# Patient Record
Sex: FEMALE | Race: WHITE | Employment: FULL TIME | URBAN - METROPOLITAN AREA
[De-identification: names, ages, dates, MRNs, and addresses within clinical notes are randomized per-mention and may not be internally consistent; named-entity substitution may affect disease eponyms.]

---

## 2018-03-20 LAB
CHOLESTEROL, TOTAL: 222 MG/DL
CHOLESTEROL/HDL RATIO: 3.3
GLUCOSE BLD-MCNC: 94 MG/DL
HDLC SERPL-MCNC: 68 MG/DL (ref 35–70)
LDL CHOLESTEROL CALCULATED: 139 MG/DL (ref 0–160)
TRIGL SERPL-MCNC: 75 MG/DL
VLDLC SERPL CALC-MCNC: NORMAL MG/DL

## 2018-10-11 ENCOUNTER — OFFICE VISIT (OUTPATIENT)
Dept: FAMILY MEDICINE CLINIC | Age: 28
End: 2018-10-11
Payer: COMMERCIAL

## 2018-10-11 VITALS
OXYGEN SATURATION: 98 % | HEIGHT: 65 IN | HEART RATE: 80 BPM | DIASTOLIC BLOOD PRESSURE: 62 MMHG | WEIGHT: 142 LBS | BODY MASS INDEX: 23.66 KG/M2 | SYSTOLIC BLOOD PRESSURE: 104 MMHG

## 2018-10-11 DIAGNOSIS — Z23 NEED FOR INFLUENZA VACCINATION: ICD-10-CM

## 2018-10-11 DIAGNOSIS — Z00.00 ANNUAL PHYSICAL EXAM: Primary | ICD-10-CM

## 2018-10-11 DIAGNOSIS — Z23 NEED FOR TETANUS BOOSTER: ICD-10-CM

## 2018-10-11 PROCEDURE — 90471 IMMUNIZATION ADMIN: CPT | Performed by: FAMILY MEDICINE

## 2018-10-11 PROCEDURE — 99385 PREV VISIT NEW AGE 18-39: CPT | Performed by: FAMILY MEDICINE

## 2018-10-11 PROCEDURE — 90715 TDAP VACCINE 7 YRS/> IM: CPT | Performed by: FAMILY MEDICINE

## 2018-10-11 PROCEDURE — 90686 IIV4 VACC NO PRSV 0.5 ML IM: CPT | Performed by: FAMILY MEDICINE

## 2018-10-11 PROCEDURE — 90472 IMMUNIZATION ADMIN EACH ADD: CPT | Performed by: FAMILY MEDICINE

## 2018-10-11 ASSESSMENT — PATIENT HEALTH QUESTIONNAIRE - PHQ9
SUM OF ALL RESPONSES TO PHQ QUESTIONS 1-9: 0
2. FEELING DOWN, DEPRESSED OR HOPELESS: 0
SUM OF ALL RESPONSES TO PHQ QUESTIONS 1-9: 0
1. LITTLE INTEREST OR PLEASURE IN DOING THINGS: 0
SUM OF ALL RESPONSES TO PHQ9 QUESTIONS 1 & 2: 0

## 2018-10-11 ASSESSMENT — ENCOUNTER SYMPTOMS
ABDOMINAL PAIN: 0
SHORTNESS OF BREATH: 0

## 2018-10-11 NOTE — PROGRESS NOTES
visual disturbance. Respiratory: Negative for shortness of breath. Cardiovascular: Negative for chest pain. Gastrointestinal: Negative for abdominal pain. Genitourinary: Negative for dysuria. Psychiatric/Behavioral: Negative for behavioral problems. PHYSICAL EXAMINATION:    /62 (Site: Left Upper Arm, Position: Sitting)   Pulse 80   Ht 5' 5\" (1.651 m)   Wt 142 lb (64.4 kg)   SpO2 98%   BMI 23.63 kg/m²     Physical Exam   Constitutional: She is oriented to person, place, and time. She appears well-developed and well-nourished. No distress. HENT:   Head: Normocephalic and atraumatic. Right Ear: External ear normal.   Left Ear: External ear normal.   Mouth/Throat: Oropharynx is clear and moist. No oropharyngeal exudate. Eyes: Pupils are equal, round, and reactive to light. Conjunctivae and EOM are normal. Right eye exhibits no discharge. Left eye exhibits no discharge. Cardiovascular: Normal rate, regular rhythm and normal heart sounds. Pulmonary/Chest: Effort normal and breath sounds normal. No respiratory distress. She has no wheezes. Abdominal: Soft. Bowel sounds are normal. She exhibits no distension. There is no tenderness. Musculoskeletal: She exhibits no edema. Neurological: She is alert and oriented to person, place, and time. Skin: Skin is warm and dry. She is not diaphoretic. Psychiatric: She has a normal mood and affect. Vitals reviewed. ASSESSMENT:   Well Adult, See encounter diagnoses  Meka Lei was seen today for new patient.     Diagnoses and all orders for this visit:    Annual physical exam  Gave referral to gyn for her to establish care for annual gyn exam.   -     Toyin Maier DO    Need for tetanus booster  -     Tdap (age 6y and older) IM ([address] [address])    Need for influenza vaccination  -     INFLUENZA, QUADV, 3 YRS AND OLDER, IM, PF, PREFILL SYR OR SDV, 0.5ML (FLUZONE QUADV, PF)          Plan:   See orders and medications filed with this encounter. The patient is advised to return for routine annual checkups. Encouraged healthy diet, regular exercise and multivitamin daily. Labs checked per orders. Optho visit q 1-2 years. Watch for skin mole changes. Vaccines ordered today per orders. Return in about 1 year (around 10/11/2019) for or sooner if needed.

## 2018-10-17 ENCOUNTER — OFFICE VISIT (OUTPATIENT)
Dept: OBGYN CLINIC | Age: 28
End: 2018-10-17
Payer: COMMERCIAL

## 2018-10-17 VITALS
SYSTOLIC BLOOD PRESSURE: 104 MMHG | HEIGHT: 64 IN | DIASTOLIC BLOOD PRESSURE: 62 MMHG | WEIGHT: 141.25 LBS | BODY MASS INDEX: 24.11 KG/M2 | HEART RATE: 70 BPM | TEMPERATURE: 97.8 F

## 2018-10-17 DIAGNOSIS — Z30.431 ENCOUNTER FOR ROUTINE CHECKING OF INTRAUTERINE CONTRACEPTIVE DEVICE (IUD): ICD-10-CM

## 2018-10-17 DIAGNOSIS — Z12.4 PAP SMEAR FOR CERVICAL CANCER SCREENING: ICD-10-CM

## 2018-10-17 DIAGNOSIS — Z01.419 WOMEN'S ANNUAL ROUTINE GYNECOLOGICAL EXAMINATION: Primary | ICD-10-CM

## 2018-10-17 PROCEDURE — 99385 PREV VISIT NEW AGE 18-39: CPT | Performed by: OBSTETRICS & GYNECOLOGY

## 2018-10-17 ASSESSMENT — ENCOUNTER SYMPTOMS
CHEST TIGHTNESS: 0
DIARRHEA: 0
SORE THROAT: 0
VOMITING: 0
BACK PAIN: 0
COLOR CHANGE: 0
WHEEZING: 0
SHORTNESS OF BREATH: 0
ABDOMINAL PAIN: 0
NAUSEA: 0

## 2018-10-24 LAB
HPV COMMENT: ABNORMAL
HPV TYPE 16: NOT DETECTED
HPV TYPE 18: NOT DETECTED
HPVOH (OTHER TYPES): DETECTED

## 2018-10-26 ENCOUNTER — OFFICE VISIT (OUTPATIENT)
Dept: OBGYN CLINIC | Age: 28
End: 2018-10-26
Payer: COMMERCIAL

## 2018-10-26 VITALS
SYSTOLIC BLOOD PRESSURE: 104 MMHG | WEIGHT: 141.5 LBS | HEART RATE: 71 BPM | TEMPERATURE: 98.1 F | DIASTOLIC BLOOD PRESSURE: 62 MMHG | BODY MASS INDEX: 24.1 KG/M2

## 2018-10-26 DIAGNOSIS — R87.610 ATYPICAL SQUAMOUS CELL CHANGES OF UNDETERMINED SIGNIFICANCE (ASCUS) ON CERVICAL CYTOLOGY WITH POSITIVE HIGH RISK HUMAN PAPILLOMA VIRUS (HPV): Primary | ICD-10-CM

## 2018-10-26 DIAGNOSIS — R87.810 ATYPICAL SQUAMOUS CELL CHANGES OF UNDETERMINED SIGNIFICANCE (ASCUS) ON CERVICAL CYTOLOGY WITH POSITIVE HIGH RISK HUMAN PAPILLOMA VIRUS (HPV): Primary | ICD-10-CM

## 2018-10-26 DIAGNOSIS — Z32.02 NEGATIVE PREGNANCY TEST: ICD-10-CM

## 2018-10-26 LAB
CONTROL: NORMAL
PREGNANCY TEST URINE, POC: NEGATIVE

## 2018-10-26 PROCEDURE — 81025 URINE PREGNANCY TEST: CPT | Performed by: OBSTETRICS & GYNECOLOGY

## 2018-10-26 PROCEDURE — 99999 PR OFFICE/OUTPT VISIT,PROCEDURE ONLY: CPT | Performed by: OBSTETRICS & GYNECOLOGY

## 2018-10-26 PROCEDURE — 57454 BX/CURETT OF CERVIX W/SCOPE: CPT | Performed by: OBSTETRICS & GYNECOLOGY

## 2018-10-26 RX ORDER — IBUPROFEN 200 MG
800 TABLET ORAL ONCE
Status: COMPLETED | OUTPATIENT
Start: 2018-10-26 | End: 2018-10-26

## 2018-10-26 RX ADMIN — Medication 800 MG: at 09:22

## 2018-11-13 ENCOUNTER — TELEPHONE (OUTPATIENT)
Dept: OBGYN CLINIC | Age: 28
End: 2018-11-13

## 2019-05-22 ENCOUNTER — OFFICE VISIT (OUTPATIENT)
Dept: FAMILY MEDICINE CLINIC | Age: 29
End: 2019-05-22
Payer: COMMERCIAL

## 2019-05-22 ENCOUNTER — HOSPITAL ENCOUNTER (OUTPATIENT)
Age: 29
Setting detail: SPECIMEN
Discharge: HOME | End: 2019-05-22
Payer: COMMERCIAL

## 2019-05-22 VITALS
TEMPERATURE: 98.5 F | OXYGEN SATURATION: 99 % | HEART RATE: 70 BPM | DIASTOLIC BLOOD PRESSURE: 72 MMHG | SYSTOLIC BLOOD PRESSURE: 100 MMHG | WEIGHT: 140.8 LBS | BODY MASS INDEX: 23.98 KG/M2

## 2019-05-22 DIAGNOSIS — K92.1 MELENA: ICD-10-CM

## 2019-05-22 DIAGNOSIS — R19.7 DIARRHEA, UNSPECIFIED TYPE: ICD-10-CM

## 2019-05-22 DIAGNOSIS — R19.7 DIARRHEA, UNSPECIFIED TYPE: Primary | ICD-10-CM

## 2019-05-22 LAB
BASOPHILS ABSOLUTE: 0 K/UL (ref 0–0.2)
BASOPHILS RELATIVE PERCENT: 0.4 %
EOSINOPHILS ABSOLUTE: 0.1 K/UL (ref 0–0.6)
EOSINOPHILS RELATIVE PERCENT: 0.8 %
HCT VFR BLD CALC: 41.6 % (ref 36–48)
HEMOGLOBIN: 14.5 G/DL (ref 12–16)
LYMPHOCYTES ABSOLUTE: 2 K/UL (ref 1–5.1)
LYMPHOCYTES RELATIVE PERCENT: 22.3 %
MCH RBC QN AUTO: 32.6 PG (ref 26–34)
MCHC RBC AUTO-ENTMCNC: 34.7 G/DL (ref 31–36)
MCV RBC AUTO: 94 FL (ref 80–100)
MONOCYTES ABSOLUTE: 0.7 K/UL (ref 0–1.3)
MONOCYTES RELATIVE PERCENT: 7.3 %
NEUTROPHILS ABSOLUTE: 6.3 K/UL (ref 1.7–7.7)
NEUTROPHILS RELATIVE PERCENT: 69.2 %
PDW BLD-RTO: 12.9 % (ref 12.4–15.4)
PLATELET # BLD: 175 K/UL (ref 135–450)
PLATELET SLIDE REVIEW: ADEQUATE
PMV BLD AUTO: 8.6 FL (ref 5–10.5)
RBC # BLD: 4.43 M/UL (ref 4–5.2)
WBC # BLD: 9.1 K/UL (ref 4–11)

## 2019-05-22 PROCEDURE — 87046 STOOL CULTR AEROBIC BACT EA: CPT

## 2019-05-22 PROCEDURE — 87505 NFCT AGENT DETECTION GI: CPT

## 2019-05-22 PROCEDURE — 87328 CRYPTOSPORIDIUM AG IA: CPT

## 2019-05-22 PROCEDURE — 99214 OFFICE O/P EST MOD 30 MIN: CPT | Performed by: FAMILY MEDICINE

## 2019-05-22 ASSESSMENT — PATIENT HEALTH QUESTIONNAIRE - PHQ9
2. FEELING DOWN, DEPRESSED OR HOPELESS: 0
SUM OF ALL RESPONSES TO PHQ QUESTIONS 1-9: 0
1. LITTLE INTEREST OR PLEASURE IN DOING THINGS: 0
SUM OF ALL RESPONSES TO PHQ QUESTIONS 1-9: 0
SUM OF ALL RESPONSES TO PHQ9 QUESTIONS 1 & 2: 0

## 2019-05-22 ASSESSMENT — ENCOUNTER SYMPTOMS
DIARRHEA: 1
ABDOMINAL PAIN: 1
FLATUS: 0
BLOATING: 0
VOMITING: 0

## 2019-05-22 NOTE — PROGRESS NOTES
Chief Complaint   Patient presents with    Diarrhea     1 week    Rectal Bleeding     2-3 days          Diarrhea    This is a new problem. Episode onset: 1 week. The problem occurs 2 to 4 times per day (last week was 5-10 times per day). The problem has been waxing and waning. The stool consistency is described as bloody. The patient states that diarrhea does not awaken her from sleep. Associated symptoms include abdominal pain. Pertinent negatives include no bloating, chills, fever, headaches, increased  flatus, vomiting or weight loss. Associated symptoms comments: Dark red blood mixed into stool. . Nothing aggravates the symptoms. There are no known risk factors. She has tried nothing for the symptoms. There is no history of irritable bowel syndrome. last week was watery stools, stools are more formed now but going to the bathroom more frequent. FH of IBS and colon cancer in maternal grandmother diagnosed 52's  No FH or ulcerative colitis  34 y.o. female presents today with above complaints    Patient Active Problem List   Diagnosis    Atypical squamous cell changes of undetermined significance (ASCUS) on cervical cytology with positive high risk human papilloma virus (HPV)     Past Medical History:   Diagnosis Date    Atypical squamous cell changes of undetermined significance (ASCUS) on cervical cytology with positive high risk human papilloma virus (HPV) 10/26/2018       No past surgical history on file. Most Recent Immunizations   Administered Date(s) Administered    Influenza, Quadv, 3 yrs and older, IM, PF (Fluzone 3 yrs and older or Afluria 5 yrs and older) 10/11/2018    Tdap (Boostrix, Adacel) 10/11/2018        Current Outpatient Medications   Medication Sig Dispense Refill    Levonorgestrel (LILETTA, 52 MG,) 19.5 MCG/DAY IUD by Intrauterine route       No current facility-administered medications for this visit.       No Known Allergies    Social History     Socioeconomic History    Marital status:      Spouse name: Not on file    Number of children: Not on file    Years of education: Not on file    Highest education level: Not on file   Occupational History    Not on file   Social Needs    Financial resource strain: Not on file    Food insecurity:     Worry: Not on file     Inability: Not on file    Transportation needs:     Medical: Not on file     Non-medical: Not on file   Tobacco Use    Smoking status: Never Smoker    Smokeless tobacco: Never Used   Substance and Sexual Activity    Alcohol use: Yes     Comment: socially: drink every other week    Drug use: No    Sexual activity: Yes     Partners: Male   Lifestyle    Physical activity:     Days per week: Not on file     Minutes per session: Not on file    Stress: Not on file   Relationships    Social connections:     Talks on phone: Not on file     Gets together: Not on file     Attends Zoroastrianism service: Not on file     Active member of club or organization: Not on file     Attends meetings of clubs or organizations: Not on file     Relationship status: Not on file    Intimate partner violence:     Fear of current or ex partner: Not on file     Emotionally abused: Not on file     Physically abused: Not on file     Forced sexual activity: Not on file   Other Topics Concern    Not on file   Social History Narrative    Not on file     Family History   Problem Relation Age of Onset    Depression Mother     High Cholesterol Mother     Alcohol Abuse Father     Other Brother 32        narcolepsy    Colon Cancer Maternal Grandmother     Cancer Maternal Grandfather         pancreatic    Alcohol Abuse Paternal Grandmother     Alcohol Abuse Paternal Grandfather                               Review Of Systems    Review of Systems   Constitutional: Negative for chills, fever and weight loss. Gastrointestinal: Positive for abdominal pain and diarrhea. Negative for bloating, flatus and vomiting.    Neurological: Negative

## 2019-05-23 LAB
CRYPTOSPORIDIUM ANTIGEN STOOL: NORMAL
GI BACTERIAL PATHOGENS BY PCR: NORMAL
GIARDIA ANTIGEN STOOL: NORMAL

## 2019-05-28 ENCOUNTER — INITIAL CONSULT (OUTPATIENT)
Dept: GASTROENTEROLOGY | Age: 29
End: 2019-05-28
Payer: COMMERCIAL

## 2019-05-28 VITALS
HEIGHT: 64 IN | SYSTOLIC BLOOD PRESSURE: 104 MMHG | WEIGHT: 143 LBS | DIASTOLIC BLOOD PRESSURE: 60 MMHG | BODY MASS INDEX: 24.41 KG/M2

## 2019-05-28 DIAGNOSIS — R19.7 BLOODY DIARRHEA: Primary | ICD-10-CM

## 2019-05-28 PROBLEM — K62.5 RECTAL BLEEDING: Status: ACTIVE | Noted: 2019-05-28

## 2019-05-28 PROBLEM — K62.5 RECTAL BLEEDING: Status: RESOLVED | Noted: 2019-05-28 | Resolved: 2019-05-28

## 2019-05-28 PROCEDURE — 99202 OFFICE O/P NEW SF 15 MIN: CPT | Performed by: INTERNAL MEDICINE

## 2019-05-28 NOTE — PROGRESS NOTES
 Transportation needs:     Medical: Not on file     Non-medical: Not on file   Tobacco Use    Smoking status: Never Smoker    Smokeless tobacco: Never Used   Substance and Sexual Activity    Alcohol use: Yes     Comment: socially: drink every other week    Drug use: No    Sexual activity: Yes     Partners: Male   Lifestyle    Physical activity:     Days per week: Not on file     Minutes per session: Not on file    Stress: Not on file   Relationships    Social connections:     Talks on phone: Not on file     Gets together: Not on file     Attends Yazidi service: Not on file     Active member of club or organization: Not on file     Attends meetings of clubs or organizations: Not on file     Relationship status: Not on file    Intimate partner violence:     Fear of current or ex partner: Not on file     Emotionally abused: Not on file     Physically abused: Not on file     Forced sexual activity: Not on file   Other Topics Concern    Not on file   Social History Narrative    Not on file     SURGICAL HISTORY   No past surgical history on file. CURRENT MEDICATIONS   (This list may include medications prescribed during this encounter as epic can not insert only the list prior to this encounter.)  Current Outpatient Rx   Medication Sig Dispense Refill    Levonorgestrel (LILETTA, 52 MG,) 19.5 MCG/DAY IUD by Intrauterine route        ALLERGIES   No Known Allergies  IMMUNIZATIONS     Immunization History   Administered Date(s) Administered    Influenza, Quadv, 3 yrs and older, IM, PF (Fluzone 3 yrs and older or Afluria 5 yrs and older) 10/11/2018    Tdap (Boostrix, Adacel) 10/11/2018     REVIEW OF SYSTEMS     Constitutional: denies fever and unexpected weight change. HENT: Negative for ear pain, hearing loss and nosebleeds. Eyes: Negative for pain and visual disturbance. Respiratory: Negative for cough, shortness of breath and wheezing.     Cardiovascular: Negative for chest pain, palpitations and leg swelling. Gastrointestinal: see HPI for details. Endocrine: Negative for polydipsia, polyphagia and polyuria. Genitourinary: Negative for difficulty urinating, dysuria, hematuria and urgency. Musculoskeletal: Positive for arthralgias and back pain. Skin: Negative for pallor and rash. Allergic/Immunologic: Negative for environmental allergies and immunocompromised state. Neurological: Negative for seizures, syncope. Hematological: Negative for adenopathy. Does not bruise/bleed easily. Psychiatric/Behavioral: Negative for agitation, confusion, hallucinations. PHYSICAL EXAM   /60 (Site: Right Upper Arm)   Ht 5' 4.25\" (1.632 m)   Wt 143 lb (64.9 kg)   BMI 24.36 kg/m²   Wt Readings from Last 3 Encounters:   05/28/19 143 lb (64.9 kg)   05/22/19 140 lb 12.8 oz (63.9 kg)   10/26/18 141 lb 8 oz (64.2 kg)     Constitutional: AAO3, No acute distress  HEENT: no pallor or icterus. Neck: supple, no adenopathy  Cardiovascular: Normal heart rate, Normal rhythm, No murmurs,. RS: Normal breath sounds, No wheezing,   Abdomen: soft, non tender, not distended, BS+. No hepatosplenomegaly. Extremities:  No edema. Neuro: AAO3, non focal.      FINAL IMPRESSION     Her symptoms appear to have resolved at least as of this time. She has not had any bleeding or diarrhea for 3-4 days now and is in fact constipated which is her baseline. Perhaps she had an infectious colitis/enterocolitis episode that has now resolved. Currently would avoid invasive testing as symptoms have resolved but if the diarrhea and bleeding recur she has been asked to call back and a colonoscopy will be setup. Plan discussed with her in detail.

## 2019-06-25 ENCOUNTER — TELEPHONE (OUTPATIENT)
Dept: GASTROENTEROLOGY | Age: 29
End: 2019-06-25

## 2019-06-25 NOTE — TELEPHONE ENCOUNTER
Patient called to let us know her symptoms have returned for about a week and half now. She is having bloody stools and yesterday there was more than usual. Advised patient colonoscopy is needed. Please call patient to setup.

## 2019-06-28 RX ORDER — POLYETHYLENE GLYCOL 3350 17 G/17G
POWDER, FOR SOLUTION ORAL
Qty: 238 G | Refills: 0 | Status: ON HOLD | OUTPATIENT
Start: 2019-06-28 | End: 2019-07-08 | Stop reason: HOSPADM

## 2019-07-05 ENCOUNTER — TELEPHONE (OUTPATIENT)
Dept: GASTROENTEROLOGY | Age: 29
End: 2019-07-05

## 2019-07-08 ENCOUNTER — HOSPITAL ENCOUNTER (OUTPATIENT)
Age: 29
Setting detail: OUTPATIENT SURGERY
Discharge: HOME | End: 2019-07-08
Attending: INTERNAL MEDICINE | Admitting: INTERNAL MEDICINE
Payer: COMMERCIAL

## 2019-07-08 VITALS
RESPIRATION RATE: 14 BRPM | TEMPERATURE: 97.6 F | BODY MASS INDEX: 23.32 KG/M2 | HEIGHT: 65 IN | OXYGEN SATURATION: 100 % | WEIGHT: 140 LBS | SYSTOLIC BLOOD PRESSURE: 98 MMHG | DIASTOLIC BLOOD PRESSURE: 56 MMHG | HEART RATE: 53 BPM

## 2019-07-08 PROBLEM — R19.7 BLOODY DIARRHEA: Status: RESOLVED | Noted: 2019-05-28 | Resolved: 2019-07-08

## 2019-07-08 PROBLEM — K62.5 RECTAL BLEEDING: Status: ACTIVE | Noted: 2019-07-08

## 2019-07-08 PROCEDURE — 6360000002 HC RX W HCPCS: Performed by: INTERNAL MEDICINE

## 2019-07-08 PROCEDURE — 7100000011 HC PHASE II RECOVERY - ADDTL 15 MIN: Performed by: INTERNAL MEDICINE

## 2019-07-08 PROCEDURE — 45378 DIAGNOSTIC COLONOSCOPY: CPT | Performed by: INTERNAL MEDICINE

## 2019-07-08 PROCEDURE — 99152 MOD SED SAME PHYS/QHP 5/>YRS: CPT | Performed by: INTERNAL MEDICINE

## 2019-07-08 PROCEDURE — 7100000010 HC PHASE II RECOVERY - FIRST 15 MIN: Performed by: INTERNAL MEDICINE

## 2019-07-08 PROCEDURE — 3609027000 HC COLONOSCOPY: Performed by: INTERNAL MEDICINE

## 2019-07-08 PROCEDURE — 2709999900 HC NON-CHARGEABLE SUPPLY: Performed by: INTERNAL MEDICINE

## 2019-07-08 PROCEDURE — 99153 MOD SED SAME PHYS/QHP EA: CPT | Performed by: INTERNAL MEDICINE

## 2019-07-08 RX ORDER — FENTANYL CITRATE 50 UG/ML
INJECTION, SOLUTION INTRAMUSCULAR; INTRAVENOUS PRN
Status: DISCONTINUED | OUTPATIENT
Start: 2019-07-08 | End: 2019-07-08 | Stop reason: ALTCHOICE

## 2019-07-08 RX ORDER — DIPHENHYDRAMINE HYDROCHLORIDE 50 MG/ML
INJECTION INTRAMUSCULAR; INTRAVENOUS PRN
Status: DISCONTINUED | OUTPATIENT
Start: 2019-07-08 | End: 2019-07-08 | Stop reason: ALTCHOICE

## 2019-07-08 RX ORDER — SODIUM CHLORIDE, SODIUM LACTATE, POTASSIUM CHLORIDE, CALCIUM CHLORIDE 600; 310; 30; 20 MG/100ML; MG/100ML; MG/100ML; MG/100ML
INJECTION, SOLUTION INTRAVENOUS CONTINUOUS
Status: DISCONTINUED | OUTPATIENT
Start: 2019-07-08 | End: 2019-07-08 | Stop reason: HOSPADM

## 2019-07-08 RX ORDER — HYDROCORTISONE ACETATE 25 MG/1
25 SUPPOSITORY RECTAL EVERY 12 HOURS
Qty: 28 SUPPOSITORY | Refills: 1 | Status: SHIPPED | OUTPATIENT
Start: 2019-07-08 | End: 2019-10-17 | Stop reason: ALTCHOICE

## 2019-07-08 RX ORDER — MIDAZOLAM HYDROCHLORIDE 5 MG/ML
INJECTION INTRAMUSCULAR; INTRAVENOUS PRN
Status: DISCONTINUED | OUTPATIENT
Start: 2019-07-08 | End: 2019-07-08 | Stop reason: ALTCHOICE

## 2019-07-08 ASSESSMENT — PAIN - FUNCTIONAL ASSESSMENT: PAIN_FUNCTIONAL_ASSESSMENT: 0-10

## 2019-07-08 ASSESSMENT — PAIN DESCRIPTION - DESCRIPTORS: DESCRIPTORS: ACHING

## 2019-07-08 NOTE — OP NOTE
colonoscopy to the terminal ileum. - Anesthesia issues: no    Specimens: Was Not Obtained    Complications:   None    Estimated blood loss: minimal    Disposition:   PACU - hemodynamically stable. Impression:   -Small internal hemorrhoids on retroflexion in the rectum. Otherwise normal colonoscopy to the terminal ileum. Recommendations:  -Next colonoscopy at age 50 for screening.  - Rectal bleeding may be hemorrhoidal or perianal. Take fiber supplementation daily, can try Anusol suppositories prn.         Tyler Byers 7/8/19 11:56 AM

## 2019-10-17 ENCOUNTER — OFFICE VISIT (OUTPATIENT)
Dept: FAMILY MEDICINE CLINIC | Age: 29
End: 2019-10-17
Payer: COMMERCIAL

## 2019-10-17 VITALS
OXYGEN SATURATION: 100 % | TEMPERATURE: 97.8 F | DIASTOLIC BLOOD PRESSURE: 66 MMHG | BODY MASS INDEX: 24.22 KG/M2 | WEIGHT: 145.4 LBS | HEIGHT: 65 IN | SYSTOLIC BLOOD PRESSURE: 110 MMHG | HEART RATE: 61 BPM

## 2019-10-17 DIAGNOSIS — Z00.00 ANNUAL PHYSICAL EXAM: Primary | ICD-10-CM

## 2019-10-17 DIAGNOSIS — Z23 NEED FOR VACCINATION: ICD-10-CM

## 2019-10-17 DIAGNOSIS — Z23 NEED FOR INFLUENZA VACCINATION: ICD-10-CM

## 2019-10-17 LAB
A/G RATIO: 2.5 (ref 1.1–2.2)
ALBUMIN SERPL-MCNC: 5 G/DL (ref 3.4–5)
ALP BLD-CCNC: 59 U/L (ref 40–129)
ALT SERPL-CCNC: 12 U/L (ref 10–40)
ANION GAP SERPL CALCULATED.3IONS-SCNC: 14 MMOL/L (ref 3–16)
AST SERPL-CCNC: 18 U/L (ref 15–37)
BILIRUB SERPL-MCNC: 1.3 MG/DL (ref 0–1)
BUN BLDV-MCNC: 9 MG/DL (ref 7–20)
CALCIUM SERPL-MCNC: 9.8 MG/DL (ref 8.3–10.6)
CHLORIDE BLD-SCNC: 101 MMOL/L (ref 99–110)
CHOLESTEROL, TOTAL: 245 MG/DL (ref 0–199)
CO2: 23 MMOL/L (ref 21–32)
CREAT SERPL-MCNC: 0.6 MG/DL (ref 0.6–1.1)
GFR AFRICAN AMERICAN: >60
GFR NON-AFRICAN AMERICAN: >60
GLOBULIN: 2 G/DL
GLUCOSE BLD-MCNC: 84 MG/DL (ref 70–99)
HDLC SERPL-MCNC: 89 MG/DL (ref 40–60)
LDL CHOLESTEROL CALCULATED: 144 MG/DL
POTASSIUM SERPL-SCNC: 4.3 MMOL/L (ref 3.5–5.1)
SODIUM BLD-SCNC: 138 MMOL/L (ref 136–145)
TOTAL PROTEIN: 7 G/DL (ref 6.4–8.2)
TRIGL SERPL-MCNC: 59 MG/DL (ref 0–150)
VLDLC SERPL CALC-MCNC: 12 MG/DL

## 2019-10-17 PROCEDURE — 90686 IIV4 VACC NO PRSV 0.5 ML IM: CPT | Performed by: FAMILY MEDICINE

## 2019-10-17 PROCEDURE — 99395 PREV VISIT EST AGE 18-39: CPT | Performed by: FAMILY MEDICINE

## 2019-10-17 PROCEDURE — 36415 COLL VENOUS BLD VENIPUNCTURE: CPT | Performed by: FAMILY MEDICINE

## 2019-10-17 PROCEDURE — 90471 IMMUNIZATION ADMIN: CPT | Performed by: FAMILY MEDICINE

## 2019-10-17 ASSESSMENT — ENCOUNTER SYMPTOMS
ABDOMINAL PAIN: 0
SHORTNESS OF BREATH: 0
NAUSEA: 0
DIARRHEA: 0
CONSTIPATION: 0
VOMITING: 0

## 2019-11-05 ENCOUNTER — OFFICE VISIT (OUTPATIENT)
Dept: OBGYN CLINIC | Age: 29
End: 2019-11-05
Payer: COMMERCIAL

## 2019-11-05 VITALS
WEIGHT: 145.6 LBS | BODY MASS INDEX: 24.23 KG/M2 | TEMPERATURE: 98.4 F | HEART RATE: 68 BPM | DIASTOLIC BLOOD PRESSURE: 62 MMHG | SYSTOLIC BLOOD PRESSURE: 98 MMHG

## 2019-11-05 DIAGNOSIS — R87.610 ATYPICAL SQUAMOUS CELL CHANGES OF UNDETERMINED SIGNIFICANCE (ASCUS) ON CERVICAL CYTOLOGY WITH POSITIVE HIGH RISK HUMAN PAPILLOMA VIRUS (HPV): ICD-10-CM

## 2019-11-05 DIAGNOSIS — R87.810 ATYPICAL SQUAMOUS CELL CHANGES OF UNDETERMINED SIGNIFICANCE (ASCUS) ON CERVICAL CYTOLOGY WITH POSITIVE HIGH RISK HUMAN PAPILLOMA VIRUS (HPV): ICD-10-CM

## 2019-11-05 DIAGNOSIS — Z01.419 WOMEN'S ANNUAL ROUTINE GYNECOLOGICAL EXAMINATION: Primary | ICD-10-CM

## 2019-11-05 DIAGNOSIS — Z31.69 ENCOUNTER FOR PRECONCEPTION CONSULTATION: ICD-10-CM

## 2019-11-05 DIAGNOSIS — Z30.011 INITIATION OF OCP (BCP): ICD-10-CM

## 2019-11-05 DIAGNOSIS — Z12.4 PAP SMEAR FOR CERVICAL CANCER SCREENING: ICD-10-CM

## 2019-11-05 DIAGNOSIS — Z30.432 ENCOUNTER FOR IUD REMOVAL: ICD-10-CM

## 2019-11-05 PROCEDURE — 99385 PREV VISIT NEW AGE 18-39: CPT | Performed by: OBSTETRICS & GYNECOLOGY

## 2019-11-05 RX ORDER — NORGESTIMATE AND ETHINYL ESTRADIOL 0.25-0.035
1 KIT ORAL DAILY
Qty: 1 PACKET | Refills: 3 | Status: SHIPPED | OUTPATIENT
Start: 2019-11-05 | End: 2020-02-18

## 2020-10-07 ENCOUNTER — OFFICE VISIT (OUTPATIENT)
Dept: OBGYN CLINIC | Age: 30
End: 2020-10-07
Payer: COMMERCIAL

## 2020-10-07 VITALS
WEIGHT: 161.13 LBS | DIASTOLIC BLOOD PRESSURE: 82 MMHG | BODY MASS INDEX: 26.81 KG/M2 | TEMPERATURE: 97.3 F | SYSTOLIC BLOOD PRESSURE: 114 MMHG | HEART RATE: 88 BPM

## 2020-10-07 PROCEDURE — 99395 PREV VISIT EST AGE 18-39: CPT | Performed by: OBSTETRICS & GYNECOLOGY

## 2020-10-07 PROCEDURE — G8484 FLU IMMUNIZE NO ADMIN: HCPCS | Performed by: OBSTETRICS & GYNECOLOGY

## 2020-10-07 ASSESSMENT — ENCOUNTER SYMPTOMS
SHORTNESS OF BREATH: 0
NAUSEA: 0
DIARRHEA: 0
ABDOMINAL PAIN: 0
VOMITING: 0
CONSTIPATION: 0

## 2020-10-07 NOTE — PROGRESS NOTES
Annual Exam      CC:   Chief Complaint   Patient presents with    Contraception       HPI:  31 y.o. G0 presents for her gynecologic annual exam. Also would like to discuss getting another IUD. Had one removed last year, has been on OCP since then. Has decided she wants another IUD at this time as she is unsure when she desires pregnancy. Patient seen and examined. Review of Systems:   Review of Systems   Constitutional: Negative for chills and fever. Respiratory: Negative for shortness of breath. Cardiovascular: Negative for chest pain. Gastrointestinal: Negative for abdominal pain, constipation, diarrhea, nausea and vomiting. Genitourinary: Negative for difficulty urinating, dysuria, menstrual problem, vaginal bleeding and vaginal discharge. Neurological: Negative for dizziness, light-headedness and headaches. Primary Care Physician: AMINAH Mascorro CNP    Obstetric History  OB History   No obstetric history on file. Gynecologic History  Menstrual History:   LMP: 9/23/2020   Menstrual pattern: q28-30d, last 4-6, moderate flow  Sexual History:   Contraception: OCP   Currently is sexually active   Denies history of STIs   No sexual problems  Pap History:   Last pap smear: ASCUS/HPV+ in 2019   History of abnormal pap smears: yes, see above    Medical History:  Past Medical History:   Diagnosis Date    Atypical squamous cell changes of undetermined significance (ASCUS) on cervical cytology with positive high risk human papilloma virus (HPV) 10/26/2018       Surgical History:  Past Surgical History:   Procedure Laterality Date    COLONOSCOPY N/A 7/8/2019       Medications:  Current Outpatient Medications   Medication Sig Dispense Refill    SPRINTEC 28 0.25-35 MG-MCG per tablet TAKE 1 TABLET BY MOUTH EVERY DAY 28 tablet 9     No current facility-administered medications for this visit.         Allergies:  No Known Allergies    Social History:  Social History Size: Medium Adult)   Pulse 88   Temp 97.3 °F (36.3 °C) (Oral)   Wt 161 lb 2 oz (73.1 kg)   LMP 09/23/2020 (Exact Date)   Breastfeeding No   BMI 26.81 kg/m²     Exam:   Physical Exam  Exam conducted with a chaperone present. Constitutional:       Appearance: She is well-developed. HENT:      Head: Normocephalic and atraumatic. Neck:      Musculoskeletal: Normal range of motion. Cardiovascular:      Rate and Rhythm: Normal rate and regular rhythm. Pulmonary:      Effort: Pulmonary effort is normal. No respiratory distress. Chest:      Breasts:         Right: Normal. No mass, nipple discharge or skin change. Left: Normal. No mass, nipple discharge or skin change. Abdominal:      General: There is no distension. Palpations: Abdomen is soft. Tenderness: There is no abdominal tenderness. There is no guarding or rebound. Genitourinary:     Comments: Pelvic exam: VULVA: normal appearing vulva with no masses, tenderness or lesions, VAGINA: normal appearing vagina with normal color and discharge, no lesions, CERVIX: normal appearing cervix without discharge or lesions, UTERUS: uterus is normal size, shape, consistency and nontender, ADNEXA: normal adnexa in size, nontender and no masses. Neurological:      Mental Status: She is alert and oriented to person, place, and time. Assessment/Plan:  31 y.o. No obstetric history on file. presenting for her annual exam:    1. Encounter for annual routine gynecological examination  Discussed age appropriate screening recommendations, pap smear sent today. Discussed breast self awareness, STI screening deferred. - PAP SMEAR    2. Pap smear for cervical cancer screening  - PAP SMEAR    3. Atypical squamous cell changes of undetermined significance (ASCUS) on cervical cytology with positive high risk human papilloma virus (HPV)  - PAP SMEAR    4.  Encounter for counseling regarding contraception  Patient with prior IUD use, desires another at thsi time. R/b/a discussed, plan for Mirena IUD. Forms started today, will return with menses to have it placed once arrives in office. Continue OCP in interim.     Dispo: PRN or for IUD insertion  Nilsa Damon MD

## 2020-10-07 NOTE — PROGRESS NOTES
Last PAP was abnormal;  November/2019 - ASC-US; Atypical Squamous Cells of Undetermined Significance. Abnormal pap history?  yes  Last HPV screen: 2019 positive  Patient has never had a mammogram.  Last Dexa Scan: no  Contraceptive method: OCP (estrogen/progesterone)

## 2020-10-08 LAB
HPV COMMENT: NORMAL
HPV TYPE 16: NOT DETECTED
HPV TYPE 18: NOT DETECTED
HPVOH (OTHER TYPES): NOT DETECTED

## 2020-10-29 ENCOUNTER — TELEPHONE (OUTPATIENT)
Dept: OBGYN CLINIC | Age: 30
End: 2020-10-29

## 2020-10-29 NOTE — TELEPHONE ENCOUNTER
Pt calling to check status of IUD. Paper work is in epic. Called [facility] they are waiting on her to respond for shipment. Pt was called and given number for [facility] at [phone]. She will call and give permission to ship to our office.

## 2020-11-17 ENCOUNTER — TELEPHONE (OUTPATIENT)
Dept: OBGYN CLINIC | Age: 30
End: 2020-11-17

## 2020-11-17 NOTE — TELEPHONE ENCOUNTER
Patient called In to verify her IUD was here in office. Verified box was delivered, and patient needs to call day 1 of her cycle to have appt scheduled.     Routing to pool

## 2020-11-24 ENCOUNTER — TELEPHONE (OUTPATIENT)
Dept: OBGYN CLINIC | Age: 30
End: 2020-11-24

## 2020-12-04 ENCOUNTER — OFFICE VISIT (OUTPATIENT)
Dept: OBGYN CLINIC | Age: 30
End: 2020-12-04
Payer: COMMERCIAL

## 2020-12-04 VITALS
BODY MASS INDEX: 26.66 KG/M2 | SYSTOLIC BLOOD PRESSURE: 100 MMHG | HEART RATE: 61 BPM | TEMPERATURE: 97.2 F | DIASTOLIC BLOOD PRESSURE: 60 MMHG | WEIGHT: 160.2 LBS

## 2020-12-04 LAB
CONTROL: NORMAL
PREGNANCY TEST URINE, POC: NEGATIVE

## 2020-12-04 PROCEDURE — 81025 URINE PREGNANCY TEST: CPT | Performed by: OBSTETRICS & GYNECOLOGY

## 2020-12-04 PROCEDURE — 58300 INSERT INTRAUTERINE DEVICE: CPT | Performed by: OBSTETRICS & GYNECOLOGY

## 2021-01-08 ENCOUNTER — OFFICE VISIT (OUTPATIENT)
Dept: OBGYN CLINIC | Age: 31
End: 2021-01-08
Payer: COMMERCIAL

## 2021-01-08 VITALS
SYSTOLIC BLOOD PRESSURE: 105 MMHG | BODY MASS INDEX: 27.29 KG/M2 | HEART RATE: 60 BPM | WEIGHT: 164 LBS | TEMPERATURE: 96.6 F | DIASTOLIC BLOOD PRESSURE: 68 MMHG

## 2021-01-08 DIAGNOSIS — N83.8 OVARIAN MASS, RIGHT: ICD-10-CM

## 2021-01-08 DIAGNOSIS — Z30.431 IUD CHECK UP: Primary | ICD-10-CM

## 2021-01-08 PROCEDURE — G8419 CALC BMI OUT NRM PARAM NOF/U: HCPCS | Performed by: OBSTETRICS & GYNECOLOGY

## 2021-01-08 PROCEDURE — G8427 DOCREV CUR MEDS BY ELIG CLIN: HCPCS | Performed by: OBSTETRICS & GYNECOLOGY

## 2021-01-08 PROCEDURE — 76856 US EXAM PELVIC COMPLETE: CPT | Performed by: OBSTETRICS & GYNECOLOGY

## 2021-01-08 PROCEDURE — 99213 OFFICE O/P EST LOW 20 MIN: CPT | Performed by: OBSTETRICS & GYNECOLOGY

## 2021-01-08 PROCEDURE — G8484 FLU IMMUNIZE NO ADMIN: HCPCS | Performed by: OBSTETRICS & GYNECOLOGY

## 2021-01-08 PROCEDURE — 1036F TOBACCO NON-USER: CPT | Performed by: OBSTETRICS & GYNECOLOGY

## 2021-01-08 NOTE — PROGRESS NOTES
Return Gyn Office Visit    CC:   Chief Complaint   Patient presents with    Follow-up       HPI:  31 y.o. [place] who presents to office for follow-up of IUD. Doing well with it overall, had some cramping right after placed but that has resolved. Also had a period last week as expected, was lighter than normal. Denies any other pain or issues. Objective:  /68 (Site: Left Upper Arm, Position: Sitting, Cuff Size: Medium Adult)   Pulse 60   Temp 96.6 °F (35.9 °C) (Oral)   Wt 164 lb (74.4 kg)   LMP 01/01/2021 (Approximate)   Breastfeeding No   BMI 27.29 kg/m²   General: Alert, well appearing, no acute distress  CV: well perfused  Resp: nonlabored  Abdomen: Soft, nontender, nondistended     Imaging:   Impression   PELVIC ULTRASOUND without DOPPLER INTERROGATION    NON OB       DATE: 1/8/21       PHYSICIAN: Angelic Marques M.D.        SONOGRAPHER: Shirleen Ganser RDMS       INDICATION: IUD placement       TYPE OF SCAN: vaginal       FINDINGS:     The cul de sac is normal. No free fluid appreciated. The cervix is normal and not enlarged. Nabothian cyst/s is not noted within the uterine cervix. The uterus measures 8.02cm x 5.90cm x 3.59cm.     The uterus is anteverted. The endometrium measures 2.52 mm. IUD seen within the endometrial canal.   The myometrium is homogeneous in appearance. No uterine anomalies are noted.        The right ovary is present.  The right ovary measures 2.57 cm x 1.50 cm x 3.18 cm. Ovary findings: There is a heterogeneous mass adjacent to the ovary measuring 5.86 x 6.75 x 6.52cm. This structure likely represents a hemorrhagic cyst vs dermoid.        The left ovary is present and normal.  The left ovary measures 2.71 cm x 1.75 cm x 2.51 cm.       Ovary findings:  no masses seen.    The left adnexa is normal.     IMPRESSION: Normal appearing uterus with an IUD seen in appropriate position in the endometrial canal. Right ovarian/adnexal mass. This structure likely represents a hemorrhagic cyst vs dermoid. Normal appearing left ovary. Imaging is limited secondary to bowel gas. The patient is well aware of the limitations of ultrasound in the detection of anomalies of the abdomen and pelvis.           Assessment/Plan  1. IUD check up  Noted to be in correct anatomic location today on ultrasound, tolerating well. 2. Ovarian mass, right  Incidental finding on US today, discussed ddx including dermoid vs hemorrhagic cyst. Denies any pain, torsion precautions reviewed and plan to repeat US in 6-8 weeks to reassess.       Dispo: 6-8 weeks for follow-up with Janes Bear MD

## 2021-02-18 ENCOUNTER — TELEPHONE (OUTPATIENT)
Dept: OBGYN CLINIC | Age: 31
End: 2021-02-18

## 2021-02-19 ENCOUNTER — OFFICE VISIT (OUTPATIENT)
Dept: OBGYN CLINIC | Age: 31
End: 2021-02-19
Payer: COMMERCIAL

## 2021-02-19 ENCOUNTER — TELEPHONE (OUTPATIENT)
Dept: FAMILY MEDICINE CLINIC | Age: 31
End: 2021-02-19

## 2021-02-19 VITALS
TEMPERATURE: 97 F | BODY MASS INDEX: 27.98 KG/M2 | WEIGHT: 168.13 LBS | DIASTOLIC BLOOD PRESSURE: 68 MMHG | HEART RATE: 83 BPM | SYSTOLIC BLOOD PRESSURE: 108 MMHG

## 2021-02-19 DIAGNOSIS — N83.8 OVARIAN MASS, RIGHT: Primary | ICD-10-CM

## 2021-02-19 DIAGNOSIS — N83.201 RIGHT OVARIAN CYST: Primary | ICD-10-CM

## 2021-02-19 PROCEDURE — G8419 CALC BMI OUT NRM PARAM NOF/U: HCPCS | Performed by: OBSTETRICS & GYNECOLOGY

## 2021-02-19 PROCEDURE — G8484 FLU IMMUNIZE NO ADMIN: HCPCS | Performed by: OBSTETRICS & GYNECOLOGY

## 2021-02-19 PROCEDURE — 76856 US EXAM PELVIC COMPLETE: CPT | Performed by: OBSTETRICS & GYNECOLOGY

## 2021-02-19 PROCEDURE — 1036F TOBACCO NON-USER: CPT | Performed by: OBSTETRICS & GYNECOLOGY

## 2021-02-19 PROCEDURE — 99213 OFFICE O/P EST LOW 20 MIN: CPT | Performed by: OBSTETRICS & GYNECOLOGY

## 2021-02-19 PROCEDURE — G8427 DOCREV CUR MEDS BY ELIG CLIN: HCPCS | Performed by: OBSTETRICS & GYNECOLOGY

## 2021-02-19 NOTE — TELEPHONE ENCOUNTER
----- Message from Kim Eisenmenger sent at 2/19/2021  9:44 AM EST -----  Subject: Appointment Request    Reason for Call: New Patient Request Appointment    QUESTIONS  Type of Appointment? New Patient/New to Provider  Reason for appointment request? No appointments available during search  Additional Information for Provider? [facility] wants to establish at [facility] because her    Fely   is a patient there. Would you take her on as a new patient? Also   she will be scheduling for surgery with her OB and will need a preop   physical.  ---------------------------------------------------------------------------  --------------  CALL BACK INFO  What is the best way for the office to contact you? OK to leave message on   voicemail  Preferred Call Back Phone Number? [phone]  ---------------------------------------------------------------------------  --------------  SCRIPT ANSWERS  Relationship to Patient? Self  Appointment reason? Establish Care/Find a provider  Have you been diagnosed with   tested for   or told that you are suspected of having COVID-19 (Coronavirus)? No  Have you had a fever or taken medication to treat a fever within the past   3 days? No  Have you had a cough   shortness of breath or flu-like symptoms within the past 3 days? No  Do you currently have flu-like symptoms including fever or chills   cough   shortness of breath   or difficulty breathing   or new loss of taste or smell? No  (Service Expert  click yes below to proceed with [employer] As Usual   Scheduling)?  Yes

## 2021-02-19 NOTE — PROGRESS NOTES
Return Gyn Office Visit    CC:   Chief Complaint   Patient presents with    Follow-up       HPI:  31 y.o. Bella Sykes who presents to office for follow-up of ovarian cyst. Identified incidentally on US in January. Since that time reports that she has had occasional lower abdominal cramps, nothing regular or consistent. Denies any abnormal bleeidng. Otherwise feeling well. Objective:  /68   Pulse 83   Temp 97 °F (36.1 °C) (Infrared)   Wt 168 lb 2 oz (76.3 kg)   BMI 27.98 kg/m²   General: Alert, well appearing, no acute distress  CV: well perfused  Resp: nonlabored  Abdomen: Soft, nontender, nondistended     Imaging:   Impression   PELVIC ULTRASOUND without DOPPLER INTERROGATION    NON OB       DATE: 2/19/21       PHYSICIAN: Ed Hammond M.D.        SONOGRAPHER: Reyna Allen RDMS       INDICATION: Follow up right adnexal mass       TYPE OF SCAN: vaginal       FINDINGS:     The cul de sac is normal. No free fluid appreciated. The cervix is normal and not enlarged. Nabothian cysts are not noted within the uterine cervix. The uterus measures 7.79cm x 5.22cm x 3.81cm.     The uterus is anteverted. The endometrium measures 4.87 mm. IUD seen within the endometrial canal.   The myometrium is homogeneous in appearance. No uterine anomalies are noted.        There is no normal right ovarian tissue seen. Ovary findings: A complex adnexal mass is again noted. The mass measures 7.87 x 6.69 x 5.98cm and appears consistent with a dermoid.       The left ovary is present and normal.  The left ovary measures 3.58 cm x 2.83 cm x 2.28 cm.       Ovary findings:  no masses seen. The left adnexa is normal.       IMPRESSION: Normal appearing uterus with an IUD seen in appropriate position in the endometrial canal. Right adnexal mass most consistent with dermoid. Normal appearing left ovary. Imaging is limited secondary to bowel gas. The patient is well aware of the limitations of ultrasound in the detection of anomalies of the abdomen and pelvis.           Assessment/Plan  1. Ovarian mass, right  Persistent on US today, most c/w dermoid. Discussed management options with patient including expectant/observation vs surgical removal. R/b of both reviewed and plan will be for surgical removal. Will attempt l/s cystectomy, however patient aware of possible need for oophorectomy as well. All questions answered, surgery packet started today.     Dispo: for pre-op visit  Christine Pope MD

## 2021-02-19 NOTE — LETTER
10. Anemia: Type  No  11. Diabetes/Low Blood Sugar  No  12. Are you pregnant? No  13. Last Menstrual Period  2021 has IUD  14. Serious Illness During Pregnancy  No  15. Breast Disease  No  16. Kidney Disease  No  17. Jaundice/Hepatitis  No  18. Hiatal Hernia/Peptic Ulcer Disease  no  19. Convulsions/Epilepsy/Stroke  No  20. Polio/Meningitis Paralysis  No  21. Back Pain/Slipped Disc  No  22. Psychological Disease  No  23. Thyroid Disease   No  24. Glaucoma/Visual Impairment   No  25. Skeletal Deformities/Defects/Arthritis  No  26. Loose Teeth/Caps on Front Teeth  No  27. Have you had any surgery? No  28. Any history of anesthesia complication in self or family? No  29. Prostate Disease  No  30. Cancer  No  31. DVT/PE/PVD  No  32. Weight Lose/Gain  No    Past Medical History:   Diagnosis Date    Atypical squamous cell changes of undetermined significance (ASCUS) on cervical cytology with positive high risk human papilloma virus (HPV) 10/26/2018     Past Surgical History:   Past Surgical History:   Procedure Laterality Date    COLONOSCOPY N/A 2019     Current Outpatient Medications   Medication Sig Dispense Refill    SPRINTEC 28 0.25-35 MG-MCG per tablet TAKE 1 TABLET BY MOUTH EVERY DAY (Patient not taking: Reported on 2021) 84 tablet 11     Current Facility-Administered Medications   Medication Dose Route Frequency Provider Last Rate Last Admin    levonorgestrel (MIRENA) IUD 52 mg 1 each  1 each Intrauterine Once Sandy Atkins MD                                                                                       IN ACCORDANCE WITH OUR FORMULARY SYSTEM, A GENERIC EQUIVALENT DRUG MAY BE DISPENSED AND ADMINISTERED UNLESS D. A. W. IS WRITTEN WITH THE MEDICATION ORDER  PRE-SURGICAL PHYSICIAN ORDERS:  GYNECOLOGY SURGERY    Patient Name __Monet Valverde_____    _1990__    Surgical Procedure___Diagnostic laparoscopy, right ovarian cystectomy, possible oopherectomy, any other indicted confirming outstanding balance. Payment plan or payment in full will be due at the post-operative appointment with the office. CONSENT FOR TREATMENT    PATIENT:__Monet Valverde_____________________________________________  I hereby authorize Dr. Dyan Sykes and the associate(s) or assistant of his/her choice to perform the following procedures:  Diagnostic laparoscopy with right ovarian cystectomy, possible oophorectomy for the purpose of evaluation or pelvic organs, removal of ovarian cyst, possible removal of ovary and/or do any other therapeutic procedure that the practitioners judgment may dictate to be advisable for my well being. The above named practitioner has explained the nature of the procedure, the risks, the benefits, and the treatment options have been explained. I have had a chance to ask questions and agree that all questions have been answered to my satisfaction. I acknowledge that no warranty or guarantee has been made to the results of such procedure. Risks to include but not limited to:  bleeding, infection, pain, need for further procedure, infertility, injury to adjacent organs (bladder, uterus, ureters, bowel), risk of anesthesia and risk of death. 1. I hereby authorize the above named practitioner and associate(s) or assistant to provide such additional services to me deemed reasonable and necessary, including but not limited to administration and maintenance of anesthesia: procedures involving pathology, radiology and IV sedation. 2. I recognize that during the course of the procedure unforeseen conditions may necessitate additional procedures than those set forth above. I therefore further authorize and request that the above named practitioner and associate(s) perform such procedures as are in the practitioners professional judgment necessary and desirable.   3. I hereby authorize the entities of [facility] to preserve for scientific teaching purposes or to otherwise dispose of tissue/specimen resulting from the procedures authorized above. 4. The form has been fully explained to me, and I certify that I understand its contents. Patient Name ______________________________________  ________________  Please print  Patients Signature __________________________________ Date _______________  (or person authorized to sign for patient)  Witness __________________________________________ Date ________________                                                Patient: Madisyn John Abram________________________________________________  I hereby authorize Dr. Ester Hernandez  and the associate(s) or assistant(s) of his/her choice to perform on (Name of Patient or Me)  the following procedures:  ___Diagnostic laparoscopy with right ovarian cystectomy, possible oophorectomy  and/or to do any other therapeutic procedure that the practitioners judgement may dictate to be advisable for my well-being. The above named practitioner has explained the nature of the procedures, the risks, the benefits and the treatment options to me. I acknowledge that no warranty or guarantee has been made to the results of such procedures or cure. ? I have a Do Not Resuscitate (DNR) order and wish to suspend the DNR status during my surgery and immediate post-operative recovery period. (If patient does not wish to suspend DNR, complete page two of this form.)    1. I hereby authorize the above named practitioner and associate(s) or assistant(s) to provide such additional services to me as deemed reasonable and necessary, including but not limited to the administration and maintenance of anesthesia; procedures involving pathology and radiology; the administration of blood or blood derivatives; and IV procedural sedation.   2. I have been informed that I need, or I may need during treatment, a transfusion of blood and/or one of its products in the interest of my health and proper medical care. The risks and benefits of receiving transfusion(s) have been described to me. These risks exist despite the fact that the blood has been carefully tested. The alternatives to transfusion, including the risks and consequences of not receiving this therapy, have been explained to me. I understand that although blood and blood products are carefully tested, there is a low risk of hepatitis, HIV transmission (AIDS), fever or allergic reaction or other blood borne disease of adverse reactions; and I agree that no expressed or implied warranty is given by the hospital, any blood bank, or any person or entity as to the blood to blood components so transfused. 3. I recognized that during the course of the operation unforeseen conditions may necessitate additional or different procedures that those set forth above. I therefore further authorize and request that the above named practitioner and associate(s) perform such procedures as are in the practitioners professional judgment necessary and desirable. 4. I consent to the observing, including by , photographing, video/audio, taping or televising of the operations or procedures to be performed including appropriate portions of my body, for medical, scientific, or educational purposes, provided my identity is not revealed by the pictures or by descriptive texts accompanying them. 5. I hereby authorize the entities of [id number] [place] [address] to preserve for scientific teaching purposes or to otherwise dispose of dismembered tissue, parts or organs resulting from the procedures authorized above. 6. I authorize the use of my social security number to track any medical device implanted during my surgery. I understand that my number may be released to the [address] [place] [address] or the  of the implant (if any). 7. A contrast agent may be required for my imaging procedure.  I may experience a warm sensation, hives, nausea, vomiting, or a small amount of bleeding under the skin. In rare instances, more serious complications could be encountered which could include shock, kidney failure, and cardiac arrest.  8. This form has been fully explained to me, and I certify that I understand its contents. Patient Signature: _______________________________________________________  Patient Representative: ____________________________Relationship: ____________  Witness: _______________________________________ Date/Time: ______________                                                                                                  [facility]  Notice of Pain Agreement    Date: _________________________________    Name: ___[facility]___________________   : ____1990_______    Controlled substance/medication agreements are signed agreements between a patient and a doctor specifying the terms and conditions under which the doctor agrees to treat a patient's chronic pain with controlled medications. You are being scheduled for a surgery where a narcotic medication may be prescribed to control your pain after your procedure. It is required that you share information with our office if you are under contract/agreement with another physician. I attest that I   ? DO    ? DO NOT   have a pain contract/ narcotic agreement established with another physician. (If under contract, complete the following information.)    Silvana Physician Name: _____________________________________________    Office Name/Address: ___________________________________________________                                         ___________________________________________________    Office Phone: ___________________________ Fax: __________________________    I understand that if I am not forthcoming with information regarding a pain contract currently in place, it may result in a violation of the contract with that provider.  The violation may result in cessation of prescribing any controlled medications. Violation may also result in the dismissal of care from the jennifer provider. It is your responsibility as the patient under contract to understand the contract and adhere to the agreement.      Patient Signature: __________________________________  Date: ________________

## 2021-02-22 ENCOUNTER — VIRTUAL VISIT (OUTPATIENT)
Dept: FAMILY MEDICINE CLINIC | Age: 31
End: 2021-02-22
Payer: COMMERCIAL

## 2021-02-22 DIAGNOSIS — Z76.89 ENCOUNTER TO ESTABLISH CARE: Primary | ICD-10-CM

## 2021-02-22 DIAGNOSIS — N83.8 OVARIAN MASS, RIGHT: ICD-10-CM

## 2021-02-22 PROCEDURE — G8427 DOCREV CUR MEDS BY ELIG CLIN: HCPCS | Performed by: NURSE PRACTITIONER

## 2021-02-22 PROCEDURE — 99213 OFFICE O/P EST LOW 20 MIN: CPT | Performed by: NURSE PRACTITIONER

## 2021-02-22 ASSESSMENT — PATIENT HEALTH QUESTIONNAIRE - PHQ9
SUM OF ALL RESPONSES TO PHQ QUESTIONS 1-9: 0
2. FEELING DOWN, DEPRESSED OR HOPELESS: 0
1. LITTLE INTEREST OR PLEASURE IN DOING THINGS: 0

## 2021-02-22 ASSESSMENT — ENCOUNTER SYMPTOMS
RESPIRATORY NEGATIVE: 1
GASTROINTESTINAL NEGATIVE: 1

## 2021-02-22 NOTE — PROGRESS NOTES
2021    TELEHEALTH EVALUATION -- Audio/Visual (During KPGBB-68 public health emergency)    HPI:    Eric Cook (:  1990) has requested an audio/video evaluation for the following concern(s):    Patient presents today to establish care. She has no issues or concerns today. She will be needing surgery soon. She has an Ovarian Mass. She had an IUD placed about 3 months ago and during the workup for that they found this cyst. She states she is currently waiting on insurance approval and then she will be able to get it scheduled. Patient's past medical history, surgical history, family history, medications,  and allergies  were all reviewed and updated as appropriate today. Review of Systems   Constitutional: Negative. HENT: Negative. Respiratory: Negative. Cardiovascular: Negative. Gastrointestinal: Negative. Musculoskeletal: Negative. Skin: Negative. Neurological: Negative. Negative for dizziness and headaches. Psychiatric/Behavioral: Negative. Prior to Visit Medications    Not on File       Social History     Tobacco Use    Smoking status: Never Smoker    Smokeless tobacco: Never Used   Substance Use Topics    Alcohol use: Yes     Comment: socially: drink every other week    Drug use:  No            PHYSICAL EXAMINATION:  [ INSTRUCTIONS:  \"[x]\" Indicates a positive item  \"[]\" Indicates a negative item  -- DELETE ALL ITEMS NOT EXAMINED]  Vital Signs: (As obtained by patient/caregiver or practitioner observation)    Blood pressure-  Heart rate-    Respiratory rate-    Temperature-  Pulse oximetry-     Constitutional: [x] Appears well-developed and well-nourished [x] No apparent distress      [] Abnormal-   Mental status  [x] Alert and awake  [x] Oriented to person/place/time []Able to follow commands      Eyes:  EOM    []  Normal  [] Abnormal-  Sclera  []  Normal  [] Abnormal -         Discharge []  None visible  [] Abnormal -    HENT: [x] Normocephalic, atraumatic. [] Abnormal   [x] Mouth/Throat: Mucous membranes are moist.     External Ears [] Normal  [] Abnormal-     Neck: [] No visualized mass     Pulmonary/Chest: [x] Respiratory effort normal.  [x] No visualized signs of difficulty breathing or respiratory distress        [] Abnormal-      Musculoskeletal:   [] Normal gait with no signs of ataxia         [x] Normal range of motion of neck        [] Abnormal-       Neurological:        [] No Facial Asymmetry (Cranial nerve 7 motor function) (limited exam to video visit)          [] No gaze palsy        [] Abnormal-         Skin:        [x] No significant exanthematous lesions or discoloration noted on facial skin         [] Abnormal-            Psychiatric:       [] Normal Affect [] No Hallucinations        [] Abnormal-     Other pertinent observable physical exam findings-     ASSESSMENT/PLAN:  1. Encounter to establish care  Doing well see below    2. Ovarian mass, right  Will see patient back for pre-op. Galen Coats is a 31 y.o. female being evaluated by a Virtual Visit (video visit) encounter to address concerns as mentioned above. A caregiver was present when appropriate. Due to this being a TeleHealth encounter (During [facility] [place] public health emergency), evaluation of the following organ systems was limited: Vitals/Constitutional/EENT/Resp/CV/GI//MS/Neuro/Skin/Heme-Lymph-Imm. Pursuant to the emergency declaration under the [address] [place] [address], [address] [place] [address] and the [employer] Act, this Virtual Visit was conducted with patient's (and/or legal guardian's) consent, to reduce the patient's risk of exposure to COVID-19 and provide necessary medical care. The patient (and/or legal guardian) has also been advised to contact this office for worsening conditions or problems, and seek emergency medical treatment and/or call 911 if deemed necessary. Services were provided through a video synchronous discussion virtually to substitute for in-person clinic visit. Patient and provider were located at their individual homes. --AMINAH Ho CNP on 2/22/2021 at 8:55 AM    An electronic signature was used to authenticate this note.

## 2021-03-04 ENCOUNTER — TELEPHONE (OUTPATIENT)
Dept: OBGYN CLINIC | Age: 31
End: 2021-03-04

## 2021-03-05 NOTE — PROGRESS NOTES
[employer]    Age 31 y.o.    female    1990    MRN [id number]    4/8/2021  Arrival Time_____________  OR Time____________101 Min     Procedure(s):  DIAGNOSTIC LAPAROSCOPY, RIGHT OVARIAN CYSTECTOMY, POSSIBLE OOPHERECTOMY                      General     Surgeon(s):  Yoanna Cruz, MD      DAY ADMIT ___  SDS/OP ___  OUTPT IN BED ___         Phone [phone] (home)    PCP _____________________ Phone_________________ Epic ( ) Epic CE ( ) Appt ________    ADDITIONAL INFO __________________________________ Cardio/Consult _____________    NOTES _____________________________________________________________________    ____________________________________________________________________________    PAT APPT DATE:________ TIME: ________  FAXED QAD: _______  (__) H&P w/ hospitalist  ____________________________________________________________________________    COVID TEST: Date/Location______________        NURSING HISTORY COMPLETE: _______  (__) CBC       (__) W/ DIFF ___________  (__)  ECHO    __________  (__) Hgb A1C    ___________  (__) CHEST X RAY   __________  (__) LIPID PROFILE  ___________  (__) EKG   __________  (__) PT/PTT   ___________  (__) PFT's   __________  (__) BMP   ___________  (__) CAROTIDS  __________  (__) CMP   ___________  (__) VEIN MAPPING  __________  (__) U/A   ___________  (__) HISTORY & PHYSICAL __________  (__) URINE C & S  ___________  (__) CARDIAC CLEARANCE __________  (__) U/A W/ FLEX  ___________  (__) PULM.  CLEARANCE __________  (__) SERUM PREGNANCY ___________  (__) Check Epic DOS orders __________  (__) TYPE & SCREEN ________ repeat ( ) (__)  __________________ __________  (__) ALBUMIN   ___________  (__)  __________________ __________  (__) TRANSFERRIN  ___________  (__)  __________________ __________  (__) LIVER PROFILE  ___________  (__)  __________________ __________  (__) CARBOXY HGB  ___________  (__) URINE PREG DOS __________  (__) NICOTINE & MET.  ___________  (__) BLOOD SUGAR DOS __________  (__) PREALBUMIN  ___________    (__) MRSA NASAL SWAB ___________  (__) BLOOD THINNERS __________  (__) ACE/ ARBS: _____________________    (__) BETABLOCKERS ___________________

## 2021-04-01 ENCOUNTER — OFFICE VISIT (OUTPATIENT)
Dept: FAMILY MEDICINE CLINIC | Age: 31
End: 2021-04-01
Payer: COMMERCIAL

## 2021-04-01 VITALS
OXYGEN SATURATION: 98 % | HEIGHT: 65 IN | WEIGHT: 172.2 LBS | BODY MASS INDEX: 28.69 KG/M2 | HEART RATE: 70 BPM | SYSTOLIC BLOOD PRESSURE: 110 MMHG | TEMPERATURE: 97.1 F | DIASTOLIC BLOOD PRESSURE: 60 MMHG

## 2021-04-01 DIAGNOSIS — N83.201 CYST OF RIGHT OVARY: ICD-10-CM

## 2021-04-01 DIAGNOSIS — Z01.818 PRE-OP EXAM: Primary | ICD-10-CM

## 2021-04-01 PROCEDURE — G8427 DOCREV CUR MEDS BY ELIG CLIN: HCPCS | Performed by: NURSE PRACTITIONER

## 2021-04-01 PROCEDURE — G8419 CALC BMI OUT NRM PARAM NOF/U: HCPCS | Performed by: NURSE PRACTITIONER

## 2021-04-01 PROCEDURE — 99212 OFFICE O/P EST SF 10 MIN: CPT | Performed by: NURSE PRACTITIONER

## 2021-04-01 ASSESSMENT — PATIENT HEALTH QUESTIONNAIRE - PHQ9
SUM OF ALL RESPONSES TO PHQ QUESTIONS 1-9: 0
2. FEELING DOWN, DEPRESSED OR HOPELESS: 0
SUM OF ALL RESPONSES TO PHQ QUESTIONS 1-9: 0
SUM OF ALL RESPONSES TO PHQ QUESTIONS 1-9: 0

## 2021-04-01 NOTE — PROGRESS NOTES
[facility]  [phone]  Fax: [phone]   Pre-operative History and Physical    Vivek Boys is a 31 y.o. female who is referred by Dr. Casa Olivares  for a consultation for preoperative physical examination and medical clearance for surgery. Patient is scheduled to have Right ovarian cystectomy possible oopherectomy at [facility] on 4-8-2021. DIAGNOSIS:  Ovarian cyst         History Obtained From:  patient    HISTORY OF PRESENT ILLNESS:    The patient is a 31 y.o. female with significant past medical history of ovaria cyst who presents pre-op. Past Medical History:   Diagnosis Date    Atypical squamous cell changes of undetermined significance (ASCUS) on cervical cytology with positive high risk human papilloma virus (HPV) 10/26/2018     Past Surgical History:   Procedure Laterality Date    COLONOSCOPY N/A 7/8/2019     No current outpatient medications on file. Current Facility-Administered Medications   Medication Dose Route Frequency Provider Last Rate Last Admin    levonorgestrel (MIRENA) IUD 52 mg 1 each  1 each Intrauterine Once Casa Olivares MD             Allergies:  Patient has no known allergies. History of allergic reaction to anesthesia:  No     Social History     Tobacco Use   Smoking Status Never Smoker   Smokeless Tobacco Never Used     The patient states she drinks 0 per week.     Family History   Problem Relation Age of Onset    Depression Mother     High Cholesterol Mother     Alcohol Abuse Father     Other Brother 32        narcolepsy    Colon Cancer Maternal Grandmother     Cancer Maternal Grandfather         pancreatic    Alcohol Abuse Paternal Grandmother     Alcohol Abuse Paternal Grandfather        REVIEW OF SYSTEMS:    CONSTITUTIONAL:  negative  EYES:  negative  HEENT:  negative  RESPIRATORY:  negative  CARDIOVASCULAR:  negative  GASTROINTESTINAL:  negative  GENITOURINARY:  negative  INTEGUMENT/BREAST:  negative  HEMATOLOGIC/LYMPHATIC: negative  ALLERGIC/IMMUNOLOGIC:  negative  ENDOCRINE:  negative  MUSCULOSKELETAL:  negative  NEUROLOGICAL:  negative    PHYSICAL EXAM:      /60   Pulse 70   Temp 97.1 °F (36.2 °C)   Ht 5' 4.75\" (1.645 m)   Wt 172 lb 3.2 oz (78.1 kg)   SpO2 98%   BMI 28.88 kg/m²     CONSTITUTIONAL:  awake, alert, cooperative, no apparent distress, and appears stated age    Eyes:  Lids and lashes normal, pupils equal, round and reactive to light, extra ocular muscles intact, sclera clear, conjunctiva normal    Head/ENT:  Normocephalic, without obvious abnormality, atramatic, sinuses nontender on palpation, external ears without lesions, oral pharynx with moist mucus membranes, tonsils without erythema or exudates, gums normal and good dentition. Neck:  Supple, symmetrical, trachea midline, no adenopathy, thyroid symmetric, not enlarged and no tenderness, skin normal    Heart:  Normal apical impulse, regular rate and rhythm, normal S1 and S2, no S3 or S4, and no murmur noted    Lungs:  No increased work of breathing, good air exchange, clear to auscultation bilaterally, no crackles or wheezing    Abdomen:   normal bowel sounds, soft, non-distended, non-tender, no masses palpated, no hepatosplenomegally    Extremities:  No clubbing, cyanosis, or edema    NEUROLOGIC:  Awake, alert, oriented to name, place and time. ASSESSMENT AND PLAN:    1. Patient is a 31 y.o. female with above specified procedure planned on 4-8-2021 with Dr. Hardik Sexton at [facility].   They will not require cardiology evaluation prior to procedure. 2. Stop ASA/NSAIDS medications 7-10 days prior to procedure. 3.Patient is cleared for surgery  4.  Pre-op sent to [facility], [address],  [place], [address] [place] [address]  [phone]

## 2021-04-02 ENCOUNTER — OFFICE VISIT (OUTPATIENT)
Dept: PRIMARY CARE CLINIC | Age: 31
End: 2021-04-02
Payer: COMMERCIAL

## 2021-04-02 DIAGNOSIS — Z01.818 PREOP TESTING: Primary | ICD-10-CM

## 2021-04-02 LAB — SARS-COV-2: NOT DETECTED

## 2021-04-02 PROCEDURE — 99211 OFF/OP EST MAY X REQ PHY/QHP: CPT | Performed by: NURSE PRACTITIONER

## 2021-04-02 PROCEDURE — G8419 CALC BMI OUT NRM PARAM NOF/U: HCPCS | Performed by: NURSE PRACTITIONER

## 2021-04-02 PROCEDURE — G8428 CUR MEDS NOT DOCUMENT: HCPCS | Performed by: NURSE PRACTITIONER

## 2021-04-02 NOTE — PROGRESS NOTES
Obstructive Sleep Apnea (JOYA) Screening     Patient:  Megha Dorsey    YOB: 1990      Medical Record #:  [id number]                     Date:  4/2/2021     1. Are you a loud and/or regular snorer? []  Yes       [x] No    2. Have you been observed to gasp or stop breathing during sleep? []  Yes       [x] No    3. Do you feel tired or groggy upon awakening or do you awaken with a headache?           []  Yes       [x] No    4. Are you often tired or fatigued during the wake time hours? []  Yes       [x] No    5. Do you fall asleep sitting, reading, watching TV or driving? []  Yes       [x] No    6. Do you often have problems with memory or concentration? []  Yes       [x] No    **If patient's score is ? 3 they are considered high risk for JOYA. An Anesthesia provider will evaluate the patient and develop a plan of care the day of surgery. Note:  If the patient's BMI is more than 35 kg m¯² , has neck circumference > 40 cm, and/or high blood pressure the risk is greater (© American Sleep Apnea Association, 2006).

## 2021-04-02 NOTE — PROGRESS NOTES
Patient instructed to:  Bring picture ID, insurance card,proof of address  Dress in comfortable,loose clothing,no jewelry, no make up/or finger polish/nocontact lenses dos if applicable  ALL Roxianne Favors  must be removed prior to DOS -if applicable  Nothing to eat or drink after midnight the night before surgery   If instructed to take medicines day of surgery by PCP, take only with sips of water  If you are taking insulin or diabetic medications check with your PCP to adjust doses according to your NPO (not eating) status  Arrange for transportation to and from surgery  With a caregiver staying with you for 24 hours  --make sure you are bring appropriate clothes to fit over large operative drsg - if applicable  Bring copies of H&P and or ekg dos     If your are taking NSAIDS/ASA products/vitamins regularly confirm this with your surgeon regarding taking them preop 5 days before surgery     Notify your Surgeon if you develop any illness between now and surgery time; cough, cold, fever, sore throat, nausea, vomiting, etc.

## 2021-04-02 NOTE — PROGRESS NOTES
Preoperative Screening for Elective Surgery/Invasive Procedures While COVID-19 present in the community     Have you had any of the following symptoms? No  o Fever, chills  o Cough  o Shortness of breath  o Muscle aches/pain  o Diarrhea  o Abdominal pain, nausea, vomiting  o Loss or decrease in taste and / or smell   Risk of Exposure  o Have you recently been hospitalized for COVID-19 or flu-like illness, if so when? No  o Recently diagnosed with COVID-19, if so when? No  o Recently tested for COVID-19, if so when? No  o Have you been in close contact with a person or family member who currently has or recently had COVID-23? If yes, when and in what context? No  o Do you live with anybody who in the last 14 days has had fever, chills, shortness of breath, muscle aches, flu-like illness? No  o Do you have any close contacts or family members who are currently in the hospital for COVID-19 or flu-like illness? No  If yes, assess recent close contact with this person. Indicate if the patient has a positive screen by answering yes to one or more of the above questions. Patients who test positive or screen positive prior to surgery or on the day of surgery should be evaluated in conjunction with the surgeon/proceduralist/anesthesiologist to determine the urgency of the procedure. Rylie Howard

## 2021-04-02 NOTE — PROGRESS NOTES
[facility] received a viral test for COVID-19. They were educated on isolation and quarantine as appropriate. For any symptoms, they were directed to seek care from their PCP, given contact information to establish with a doctor, directed to an urgent care or the emergency room.

## 2021-04-02 NOTE — PATIENT INSTRUCTIONS
Advance Care Planning  People with COVID-19 may have no symptoms, mild symptoms, such as fever, cough, and shortness of breath or they may have more severe illness, developing severe and fatal pneumonia. As a result, Advance Care Planning with attention to naming a health care decision maker (someone you trust to make healthcare decisions for you if you could not speak for yourself) and sharing other health care preferences is important BEFORE a possible health crisis. Please contact your Primary Care Provider to discuss Advance Care Planning. Preventing the Spread of Coronavirus Disease 2019 in Homes and Residential Communities  For the most recent information go to [email or web address]    Prevention steps for People with confirmed or suspected COVID-19 (including persons under investigation) who do not need to be hospitalized  and   People with confirmed COVID-19 who were hospitalized and determined to be medically stable to go home    Your healthcare provider and public health staff will evaluate whether you can be cared for at home. If it is determined that you do not need to be hospitalized and can be isolated at home, you will be monitored by staff from your local or state health department. You should follow the prevention steps below until a healthcare provider or local or state health department says you can return to your normal activities. Stay home except to get medical care  People who are mildly ill with COVID-19 are able to isolate at home during their illness. You should restrict activities outside your home, except for getting medical care. Do not go to work, school, or public areas. Avoid using public transportation, ride-sharing, or taxis. Separate yourself from other people and animals in your home  People: As much as possible, you should stay in a specific room and away from other people in your home.  Also, you should use a separate bathroom, if available. Animals: You should restrict contact with pets and other animals while you are sick with COVID-19, just like you would around other people. Although there have not been reports of pets or other animals becoming sick with COVID-19, it is still recommended that people sick with COVID-19 limit contact with animals until more information is known about the virus. When possible, have another member of your household care for your animals while you are sick. If you are sick with COVID-19, avoid contact with your pet, including petting, snuggling, being kissed or licked, and sharing food. If you must care for your pet or be around animals while you are sick, wash your hands before and after you interact with pets and wear a facemask. Call ahead before visiting your doctor  If you have a medical appointment, call the healthcare provider and tell them that you have or may have COVID-19. This will help the healthcare providers office take steps to keep other people from getting infected or exposed. Wear a facemask  You should wear a facemask when you are around other people (e.g., sharing a room or vehicle) or pets and before you enter a healthcare providers office. If you are not able to wear a facemask (for example, because it causes trouble breathing), then people who live with you should not stay in the same room with you, or they should wear a facemask if they enter your room. Cover your coughs and sneezes  Cover your mouth and nose with a tissue when you cough or sneeze. Throw used tissues in a lined trash can. Immediately wash your hands with soap and water for at least 20 seconds or, if soap and water are not available, clean your hands with an alcohol-based hand  that contains at least 60% alcohol.   Clean your hands often  Wash your hands often with soap and water for at least 20 seconds, especially after blowing your nose, coughing, or sneezing; going to the bathroom; and have a medical emergency and need to call 911, notify the dispatch personnel that you have, or are being evaluated for COVID-19. If possible, put on a facemask before emergency medical services arrive. Discontinuing home isolation  Patients with confirmed COVID-19 should remain under home isolation precautions until the risk of secondary transmission to others is thought to be low. The decision to discontinue home isolation precautions should be made on a case-by-case basis, in consultation with healthcare providers and state and local health departments.

## 2021-04-05 ENCOUNTER — OFFICE VISIT (OUTPATIENT)
Dept: OBGYN CLINIC | Age: 31
End: 2021-04-05

## 2021-04-05 VITALS
HEART RATE: 73 BPM | DIASTOLIC BLOOD PRESSURE: 60 MMHG | WEIGHT: 171.2 LBS | BODY MASS INDEX: 28.49 KG/M2 | SYSTOLIC BLOOD PRESSURE: 112 MMHG | TEMPERATURE: 98 F

## 2021-04-05 DIAGNOSIS — N83.8 OVARIAN MASS, RIGHT: Primary | ICD-10-CM

## 2021-04-05 PROCEDURE — 99024 POSTOP FOLLOW-UP VISIT: CPT | Performed by: OBSTETRICS & GYNECOLOGY

## 2021-04-05 NOTE — PROGRESS NOTES
Return Gyn Office Visit    CC:   Chief Complaint   Patient presents with    Pre-op Exam       HPI:  31 y.o. Natalia Chacon who presents to office for pre-op visit. Scheduled for dx l/s, right ovarian cystectomy, possible right oopherectomy for suspected dermoid cyst. Doing well today without concerns. Objective:  /60 (Site: Right Upper Arm, Position: Sitting, Cuff Size: Medium Adult)   Pulse 73   Temp 98 °F (36.7 °C) (Infrared)   Wt 171 lb 3.2 oz (77.7 kg)   BMI 28.49 kg/m²   Physical Exam  HENT:      Head: Normocephalic. Cardiovascular:      Rate and Rhythm: Normal rate. Pulmonary:      Effort: No respiratory distress. Neurological:      Mental Status: She is alert. Labs:   Pap 10/2020 NILM/HPV neg  Covid negative     Imaging:   Impression   PELVIC ULTRASOUND without DOPPLER INTERROGATION    NON OB       DATE: 2/19/21       PHYSICIAN: Nguyễn Lizarraga M.D.        SONOGRAPHER: Bunny Siddiqui [facility]       INDICATION: Follow up right adnexal mass       TYPE OF SCAN: vaginal       FINDINGS:     The cul de sac is normal. No free fluid appreciated. The cervix is normal and not enlarged. Nabothian cysts are not noted within the uterine cervix. The uterus measures 7.79cm x 5.22cm x 3.81cm.     The uterus is anteverted. The endometrium measures 4.87 mm. IUD seen within the endometrial canal.   The myometrium is homogeneous in appearance. No uterine anomalies are noted.        There is no normal right ovarian tissue seen. Ovary findings: A complex adnexal mass is again noted. The mass measures 7.87 x 6.69 x 5.98cm and appears consistent with a dermoid.       The left ovary is present and normal.  The left ovary measures 3.58 cm x 2.83 cm x 2.28 cm.       Ovary findings:  no masses seen. The left adnexa is normal.       IMPRESSION: Normal appearing uterus with an IUD seen in appropriate position in the endometrial canal. Right adnexal mass most consistent with dermoid. Normal appearing left ovary. Imaging is limited secondary to bowel gas. The patient is well aware of the limitations of ultrasound in the detection of anomalies of the abdomen and pelvis.           Assessment/Plan  1. Ovarian mass, right  Patient with suspected right ovarian dermoid on US. Discussed given size and persistent recommendation for removal, r/b/a or procedure discussed and all questions answered.   - consents signed today  - has seen PCP for pre-op clearance  - COVID negative    Dispo: to OR as scheduled   Myla Batista MD

## 2021-04-07 ENCOUNTER — ANESTHESIA EVENT (OUTPATIENT)
Dept: OPERATING ROOM | Age: 31
End: 2021-04-07
Payer: COMMERCIAL

## 2021-04-08 ENCOUNTER — ANESTHESIA (OUTPATIENT)
Dept: OPERATING ROOM | Age: 31
End: 2021-04-08
Payer: COMMERCIAL

## 2021-04-08 ENCOUNTER — HOSPITAL ENCOUNTER (OUTPATIENT)
Age: 31
Setting detail: OUTPATIENT SURGERY
Discharge: HOME | End: 2021-04-08
Attending: OBSTETRICS & GYNECOLOGY | Admitting: OBSTETRICS & GYNECOLOGY
Payer: COMMERCIAL

## 2021-04-08 VITALS
DIASTOLIC BLOOD PRESSURE: 57 MMHG | SYSTOLIC BLOOD PRESSURE: 108 MMHG | HEIGHT: 65 IN | TEMPERATURE: 96.6 F | RESPIRATION RATE: 11 BRPM | HEART RATE: 67 BPM | WEIGHT: 172 LBS | OXYGEN SATURATION: 96 % | BODY MASS INDEX: 28.66 KG/M2

## 2021-04-08 VITALS
SYSTOLIC BLOOD PRESSURE: 110 MMHG | OXYGEN SATURATION: 91 % | RESPIRATION RATE: 13 BRPM | DIASTOLIC BLOOD PRESSURE: 58 MMHG

## 2021-04-08 DIAGNOSIS — N83.8 OVARIAN MASS: ICD-10-CM

## 2021-04-08 DIAGNOSIS — Z98.890 S/P OVARIAN CYSTECTOMY: Primary | ICD-10-CM

## 2021-04-08 DIAGNOSIS — Z87.42 S/P OVARIAN CYSTECTOMY: Primary | ICD-10-CM

## 2021-04-08 LAB
ABO/RH: NORMAL
ANTIBODY SCREEN: NORMAL
HCT VFR BLD CALC: 40 % (ref 36–48)
HEMOGLOBIN: 13.9 G/DL (ref 12–16)
MCH RBC QN AUTO: 32 PG (ref 26–34)
MCHC RBC AUTO-ENTMCNC: 34.8 G/DL (ref 31–36)
MCV RBC AUTO: 92.1 FL (ref 80–100)
PDW BLD-RTO: 12.6 % (ref 12.4–15.4)
PLATELET # BLD: 183 K/UL (ref 135–450)
PMV BLD AUTO: 8.3 FL (ref 5–10.5)
PREGNANCY, URINE: NEGATIVE
RBC # BLD: 4.34 M/UL (ref 4–5.2)
WBC # BLD: 7.7 K/UL (ref 4–11)

## 2021-04-08 PROCEDURE — 85027 COMPLETE CBC AUTOMATED: CPT

## 2021-04-08 PROCEDURE — 7100000000 HC PACU RECOVERY - FIRST 15 MIN: Performed by: OBSTETRICS & GYNECOLOGY

## 2021-04-08 PROCEDURE — 3700000000 HC ANESTHESIA ATTENDED CARE: Performed by: OBSTETRICS & GYNECOLOGY

## 2021-04-08 PROCEDURE — 7100000011 HC PHASE II RECOVERY - ADDTL 15 MIN: Performed by: OBSTETRICS & GYNECOLOGY

## 2021-04-08 PROCEDURE — 7100000001 HC PACU RECOVERY - ADDTL 15 MIN: Performed by: OBSTETRICS & GYNECOLOGY

## 2021-04-08 PROCEDURE — 3600000002 HC SURGERY LEVEL 2 BASE: Performed by: OBSTETRICS & GYNECOLOGY

## 2021-04-08 PROCEDURE — 3700000001 HC ADD 15 MINUTES (ANESTHESIA): Performed by: OBSTETRICS & GYNECOLOGY

## 2021-04-08 PROCEDURE — 2500000003 HC RX 250 WO HCPCS: Performed by: NURSE ANESTHETIST, CERTIFIED REGISTERED

## 2021-04-08 PROCEDURE — 7100000010 HC PHASE II RECOVERY - FIRST 15 MIN: Performed by: OBSTETRICS & GYNECOLOGY

## 2021-04-08 PROCEDURE — 6360000002 HC RX W HCPCS: Performed by: NURSE ANESTHETIST, CERTIFIED REGISTERED

## 2021-04-08 PROCEDURE — 58662 LAPAROSCOPY EXCISE LESIONS: CPT | Performed by: OBSTETRICS & GYNECOLOGY

## 2021-04-08 PROCEDURE — 86850 RBC ANTIBODY SCREEN: CPT

## 2021-04-08 PROCEDURE — 6360000002 HC RX W HCPCS: Performed by: ANESTHESIOLOGY

## 2021-04-08 PROCEDURE — 2580000003 HC RX 258: Performed by: ANESTHESIOLOGY

## 2021-04-08 PROCEDURE — 86901 BLOOD TYPING SEROLOGIC RH(D): CPT

## 2021-04-08 PROCEDURE — 2709999900 HC NON-CHARGEABLE SUPPLY: Performed by: OBSTETRICS & GYNECOLOGY

## 2021-04-08 PROCEDURE — 84703 CHORIONIC GONADOTROPIN ASSAY: CPT

## 2021-04-08 PROCEDURE — 86900 BLOOD TYPING SEROLOGIC ABO: CPT

## 2021-04-08 PROCEDURE — 3600000012 HC SURGERY LEVEL 2 ADDTL 15MIN: Performed by: OBSTETRICS & GYNECOLOGY

## 2021-04-08 PROCEDURE — 2720000010 HC SURG SUPPLY STERILE: Performed by: OBSTETRICS & GYNECOLOGY

## 2021-04-08 PROCEDURE — 2580000003 HC RX 258: Performed by: OBSTETRICS & GYNECOLOGY

## 2021-04-08 PROCEDURE — 88307 TISSUE EXAM BY PATHOLOGIST: CPT

## 2021-04-08 PROCEDURE — 2500000003 HC RX 250 WO HCPCS: Performed by: OBSTETRICS & GYNECOLOGY

## 2021-04-08 RX ORDER — MAGNESIUM HYDROXIDE 1200 MG/15ML
LIQUID ORAL CONTINUOUS PRN
Status: COMPLETED | OUTPATIENT
Start: 2021-04-08 | End: 2021-04-08

## 2021-04-08 RX ORDER — BUPIVACAINE HYDROCHLORIDE 5 MG/ML
INJECTION, SOLUTION EPIDURAL; INTRACAUDAL PRN
Status: DISCONTINUED | OUTPATIENT
Start: 2021-04-08 | End: 2021-04-08 | Stop reason: ALTCHOICE

## 2021-04-08 RX ORDER — HYDROCODONE BITARTRATE AND ACETAMINOPHEN 5; 325 MG/1; MG/1
1 TABLET ORAL EVERY 6 HOURS PRN
Qty: 12 TABLET | Refills: 0 | Status: SHIPPED | OUTPATIENT
Start: 2021-04-08 | End: 2021-04-11

## 2021-04-08 RX ORDER — ONDANSETRON 2 MG/ML
4 INJECTION INTRAMUSCULAR; INTRAVENOUS PRN
Status: DISCONTINUED | OUTPATIENT
Start: 2021-04-08 | End: 2021-04-08 | Stop reason: HOSPADM

## 2021-04-08 RX ORDER — LIDOCAINE HYDROCHLORIDE 10 MG/ML
0.3 INJECTION, SOLUTION EPIDURAL; INFILTRATION; INTRACAUDAL; PERINEURAL
Status: DISCONTINUED | OUTPATIENT
Start: 2021-04-08 | End: 2021-04-08 | Stop reason: HOSPADM

## 2021-04-08 RX ORDER — OXYCODONE HYDROCHLORIDE AND ACETAMINOPHEN 5; 325 MG/1; MG/1
1 TABLET ORAL PRN
Status: DISCONTINUED | OUTPATIENT
Start: 2021-04-08 | End: 2021-04-08 | Stop reason: HOSPADM

## 2021-04-08 RX ORDER — HYDROMORPHONE HCL 110MG/55ML
PATIENT CONTROLLED ANALGESIA SYRINGE INTRAVENOUS PRN
Status: DISCONTINUED | OUTPATIENT
Start: 2021-04-08 | End: 2021-04-08 | Stop reason: SDUPTHER

## 2021-04-08 RX ORDER — SODIUM CHLORIDE, SODIUM LACTATE, POTASSIUM CHLORIDE, CALCIUM CHLORIDE 600; 310; 30; 20 MG/100ML; MG/100ML; MG/100ML; MG/100ML
INJECTION, SOLUTION INTRAVENOUS CONTINUOUS
Status: DISCONTINUED | OUTPATIENT
Start: 2021-04-08 | End: 2021-04-08 | Stop reason: HOSPADM

## 2021-04-08 RX ORDER — MORPHINE SULFATE 2 MG/ML
2 INJECTION, SOLUTION INTRAMUSCULAR; INTRAVENOUS EVERY 5 MIN PRN
Status: DISCONTINUED | OUTPATIENT
Start: 2021-04-08 | End: 2021-04-08 | Stop reason: HOSPADM

## 2021-04-08 RX ORDER — DEXAMETHASONE SODIUM PHOSPHATE 4 MG/ML
INJECTION, SOLUTION INTRA-ARTICULAR; INTRALESIONAL; INTRAMUSCULAR; INTRAVENOUS; SOFT TISSUE PRN
Status: DISCONTINUED | OUTPATIENT
Start: 2021-04-08 | End: 2021-04-08 | Stop reason: SDUPTHER

## 2021-04-08 RX ORDER — FENTANYL CITRATE 50 UG/ML
INJECTION, SOLUTION INTRAMUSCULAR; INTRAVENOUS PRN
Status: DISCONTINUED | OUTPATIENT
Start: 2021-04-08 | End: 2021-04-08 | Stop reason: SDUPTHER

## 2021-04-08 RX ORDER — MIDAZOLAM HYDROCHLORIDE 1 MG/ML
INJECTION INTRAMUSCULAR; INTRAVENOUS PRN
Status: DISCONTINUED | OUTPATIENT
Start: 2021-04-08 | End: 2021-04-08 | Stop reason: SDUPTHER

## 2021-04-08 RX ORDER — SODIUM CHLORIDE 9 MG/ML
25 INJECTION, SOLUTION INTRAVENOUS PRN
Status: DISCONTINUED | OUTPATIENT
Start: 2021-04-08 | End: 2021-04-08 | Stop reason: HOSPADM

## 2021-04-08 RX ORDER — LIDOCAINE HYDROCHLORIDE 10 MG/ML
INJECTION, SOLUTION INFILTRATION; PERINEURAL PRN
Status: DISCONTINUED | OUTPATIENT
Start: 2021-04-08 | End: 2021-04-08 | Stop reason: SDUPTHER

## 2021-04-08 RX ORDER — OXYCODONE HYDROCHLORIDE AND ACETAMINOPHEN 5; 325 MG/1; MG/1
2 TABLET ORAL PRN
Status: DISCONTINUED | OUTPATIENT
Start: 2021-04-08 | End: 2021-04-08 | Stop reason: HOSPADM

## 2021-04-08 RX ORDER — DIPHENHYDRAMINE HYDROCHLORIDE 50 MG/ML
12.5 INJECTION INTRAMUSCULAR; INTRAVENOUS
Status: DISCONTINUED | OUTPATIENT
Start: 2021-04-08 | End: 2021-04-08 | Stop reason: HOSPADM

## 2021-04-08 RX ORDER — ROCURONIUM BROMIDE 10 MG/ML
INJECTION, SOLUTION INTRAVENOUS PRN
Status: DISCONTINUED | OUTPATIENT
Start: 2021-04-08 | End: 2021-04-08 | Stop reason: SDUPTHER

## 2021-04-08 RX ORDER — SODIUM CHLORIDE 0.9 % (FLUSH) 0.9 %
5-40 SYRINGE (ML) INJECTION PRN
Status: DISCONTINUED | OUTPATIENT
Start: 2021-04-08 | End: 2021-04-08 | Stop reason: HOSPADM

## 2021-04-08 RX ORDER — PROPOFOL 10 MG/ML
INJECTION, EMULSION INTRAVENOUS PRN
Status: DISCONTINUED | OUTPATIENT
Start: 2021-04-08 | End: 2021-04-08 | Stop reason: SDUPTHER

## 2021-04-08 RX ORDER — KETOROLAC TROMETHAMINE 30 MG/ML
INJECTION, SOLUTION INTRAMUSCULAR; INTRAVENOUS PRN
Status: DISCONTINUED | OUTPATIENT
Start: 2021-04-08 | End: 2021-04-08 | Stop reason: SDUPTHER

## 2021-04-08 RX ORDER — ONDANSETRON 2 MG/ML
INJECTION INTRAMUSCULAR; INTRAVENOUS PRN
Status: DISCONTINUED | OUTPATIENT
Start: 2021-04-08 | End: 2021-04-08 | Stop reason: SDUPTHER

## 2021-04-08 RX ORDER — LABETALOL HYDROCHLORIDE 5 MG/ML
5 INJECTION, SOLUTION INTRAVENOUS EVERY 10 MIN PRN
Status: DISCONTINUED | OUTPATIENT
Start: 2021-04-08 | End: 2021-04-08 | Stop reason: HOSPADM

## 2021-04-08 RX ORDER — PROMETHAZINE HYDROCHLORIDE 25 MG/ML
6.25 INJECTION, SOLUTION INTRAMUSCULAR; INTRAVENOUS
Status: DISCONTINUED | OUTPATIENT
Start: 2021-04-08 | End: 2021-04-08 | Stop reason: HOSPADM

## 2021-04-08 RX ORDER — IBUPROFEN 600 MG/1
600 TABLET ORAL EVERY 6 HOURS PRN
Qty: 40 TABLET | Refills: 0 | Status: SHIPPED | OUTPATIENT
Start: 2021-04-08

## 2021-04-08 RX ORDER — MORPHINE SULFATE 2 MG/ML
1 INJECTION, SOLUTION INTRAMUSCULAR; INTRAVENOUS EVERY 5 MIN PRN
Status: DISCONTINUED | OUTPATIENT
Start: 2021-04-08 | End: 2021-04-08 | Stop reason: HOSPADM

## 2021-04-08 RX ORDER — MEPERIDINE HYDROCHLORIDE 50 MG/ML
12.5 INJECTION INTRAMUSCULAR; INTRAVENOUS; SUBCUTANEOUS EVERY 5 MIN PRN
Status: DISCONTINUED | OUTPATIENT
Start: 2021-04-08 | End: 2021-04-08 | Stop reason: HOSPADM

## 2021-04-08 RX ORDER — HYDRALAZINE HYDROCHLORIDE 20 MG/ML
5 INJECTION INTRAMUSCULAR; INTRAVENOUS EVERY 10 MIN PRN
Status: DISCONTINUED | OUTPATIENT
Start: 2021-04-08 | End: 2021-04-08 | Stop reason: HOSPADM

## 2021-04-08 RX ORDER — SODIUM CHLORIDE 0.9 % (FLUSH) 0.9 %
5-40 SYRINGE (ML) INJECTION EVERY 12 HOURS SCHEDULED
Status: DISCONTINUED | OUTPATIENT
Start: 2021-04-08 | End: 2021-04-08 | Stop reason: HOSPADM

## 2021-04-08 RX ORDER — SODIUM CHLORIDE, SODIUM LACTATE, POTASSIUM CHLORIDE, AND CALCIUM CHLORIDE .6; .31; .03; .02 G/100ML; G/100ML; G/100ML; G/100ML
IRRIGANT IRRIGATION PRN
Status: DISCONTINUED | OUTPATIENT
Start: 2021-04-08 | End: 2021-04-08 | Stop reason: ALTCHOICE

## 2021-04-08 RX ADMIN — ONDANSETRON 4 MG: 2 INJECTION INTRAMUSCULAR; INTRAVENOUS at 13:04

## 2021-04-08 RX ADMIN — SUGAMMADEX 200 MG: 100 INJECTION, SOLUTION INTRAVENOUS at 11:41

## 2021-04-08 RX ADMIN — DEXAMETHASONE SODIUM PHOSPHATE 10 MG: 4 INJECTION, SOLUTION INTRAMUSCULAR; INTRAVENOUS at 10:11

## 2021-04-08 RX ADMIN — FENTANYL CITRATE 50 MCG: 50 INJECTION INTRAMUSCULAR; INTRAVENOUS at 10:00

## 2021-04-08 RX ADMIN — PROPOFOL 200 MG: 10 INJECTION, EMULSION INTRAVENOUS at 10:00

## 2021-04-08 RX ADMIN — LIDOCAINE HYDROCHLORIDE 40 MG: 10 INJECTION, SOLUTION INFILTRATION; PERINEURAL at 10:00

## 2021-04-08 RX ADMIN — SODIUM CHLORIDE, POTASSIUM CHLORIDE, SODIUM LACTATE AND CALCIUM CHLORIDE: 600; 310; 30; 20 INJECTION, SOLUTION INTRAVENOUS at 10:50

## 2021-04-08 RX ADMIN — FENTANYL CITRATE 50 MCG: 50 INJECTION INTRAMUSCULAR; INTRAVENOUS at 10:22

## 2021-04-08 RX ADMIN — MIDAZOLAM HYDROCHLORIDE 2 MG: 2 INJECTION, SOLUTION INTRAMUSCULAR; INTRAVENOUS at 09:54

## 2021-04-08 RX ADMIN — KETOROLAC TROMETHAMINE 30 MG: 30 INJECTION, SOLUTION INTRAMUSCULAR; INTRAVENOUS at 11:41

## 2021-04-08 RX ADMIN — HYDROMORPHONE HYDROCHLORIDE 1 MG: 2 INJECTION, SOLUTION INTRAMUSCULAR; INTRAVENOUS; SUBCUTANEOUS at 11:41

## 2021-04-08 RX ADMIN — ROCURONIUM BROMIDE 10 MG: 10 SOLUTION INTRAVENOUS at 10:59

## 2021-04-08 RX ADMIN — HYDROMORPHONE HYDROCHLORIDE 1 MG: 2 INJECTION, SOLUTION INTRAMUSCULAR; INTRAVENOUS; SUBCUTANEOUS at 11:50

## 2021-04-08 RX ADMIN — HYDROMORPHONE HYDROCHLORIDE 0.25 MG: 1 INJECTION, SOLUTION INTRAMUSCULAR; INTRAVENOUS; SUBCUTANEOUS at 12:19

## 2021-04-08 RX ADMIN — ROCURONIUM BROMIDE 40 MG: 10 SOLUTION INTRAVENOUS at 10:00

## 2021-04-08 RX ADMIN — SODIUM CHLORIDE, POTASSIUM CHLORIDE, SODIUM LACTATE AND CALCIUM CHLORIDE: 600; 310; 30; 20 INJECTION, SOLUTION INTRAVENOUS at 08:12

## 2021-04-08 RX ADMIN — ONDANSETRON 4 MG: 2 INJECTION INTRAMUSCULAR; INTRAVENOUS at 10:11

## 2021-04-08 ASSESSMENT — PULMONARY FUNCTION TESTS
PIF_VALUE: 36
PIF_VALUE: 28
PIF_VALUE: 3
PIF_VALUE: 11
PIF_VALUE: 5
PIF_VALUE: 4
PIF_VALUE: 11
PIF_VALUE: 11
PIF_VALUE: 31
PIF_VALUE: 25
PIF_VALUE: 17
PIF_VALUE: 35
PIF_VALUE: 1
PIF_VALUE: 34
PIF_VALUE: 3
PIF_VALUE: 35
PIF_VALUE: 11
PIF_VALUE: 34
PIF_VALUE: 29
PIF_VALUE: 28
PIF_VALUE: 37
PIF_VALUE: 17
PIF_VALUE: 25
PIF_VALUE: 17
PIF_VALUE: 17
PIF_VALUE: 28
PIF_VALUE: 25
PIF_VALUE: 31
PIF_VALUE: 18
PIF_VALUE: 17
PIF_VALUE: 24
PIF_VALUE: 0
PIF_VALUE: 29
PIF_VALUE: 31
PIF_VALUE: 32
PIF_VALUE: 18
PIF_VALUE: 0
PIF_VALUE: 0
PIF_VALUE: 29
PIF_VALUE: 0
PIF_VALUE: 26
PIF_VALUE: 28
PIF_VALUE: 30
PIF_VALUE: 35
PIF_VALUE: 17
PIF_VALUE: 25
PIF_VALUE: 21
PIF_VALUE: 29
PIF_VALUE: 41
PIF_VALUE: 16
PIF_VALUE: 28
PIF_VALUE: 31
PIF_VALUE: 27
PIF_VALUE: 29
PIF_VALUE: 0
PIF_VALUE: 17
PIF_VALUE: 31
PIF_VALUE: 37
PIF_VALUE: 29
PIF_VALUE: 17
PIF_VALUE: 4
PIF_VALUE: 24
PIF_VALUE: 31
PIF_VALUE: 17
PIF_VALUE: 29
PIF_VALUE: 1
PIF_VALUE: 29
PIF_VALUE: 31
PIF_VALUE: 17
PIF_VALUE: 34
PIF_VALUE: 29
PIF_VALUE: 1
PIF_VALUE: 29
PIF_VALUE: 0
PIF_VALUE: 29
PIF_VALUE: 18

## 2021-04-08 ASSESSMENT — PAIN SCALES - GENERAL
PAINLEVEL_OUTOF10: 5
PAINLEVEL_OUTOF10: 5

## 2021-04-08 NOTE — ANESTHESIA PRE PROCEDURE
Department of Anesthesiology  Preprocedure Note       Name:  Suman Costa   Age:  31 y.o.  :  1990                                          MRN:  [id number]         Date:  2021      Surgeon: Lorena Mendoza):  Feli Bobby MD    Procedure: Procedure(s):  DIAGNOSTIC LAPAROSCOPY, RIGHT OVARIAN CYSTECTOMY, POSSIBLE OOPHERECTOMY    Medications prior to admission:   Prior to Admission medications    Not on File       Current medications:    Current Facility-Administered Medications   Medication Dose Route Frequency Provider Last Rate Last Admin    lactated ringers infusion   Intravenous Continuous Ivana Diez MD        sodium chloride flush 0.9 % injection 5-40 mL  5-40 mL Intravenous 2 times per day Ivana Diez MD        sodium chloride flush 0.9 % injection 5-40 mL  5-40 mL Intravenous PRN Ivana Diez MD        0.9 % sodium chloride infusion  25 mL Intravenous PRN Ivana Diez MD        lidocaine PF 1 % injection 0.3 mL  0.3 mL Intradermal Once PRN Ivana Diez MD           Allergies:  No Known Allergies    Problem List:    Patient Active Problem List   Diagnosis Code    Atypical squamous cell changes of undetermined significance (ASCUS) on cervical cytology with positive high risk human papilloma virus (HPV) R87.610, R87.810    Rectal bleeding K62.5    Ovarian mass, right N83.8       Past Medical History:        Diagnosis Date    Atypical squamous cell changes of undetermined significance (ASCUS) on cervical cytology with positive high risk human papilloma virus (HPV) 10/26/2018       Past Surgical History:        Procedure Laterality Date    COLONOSCOPY N/A 2019       Social History:    Social History     Tobacco Use    Smoking status: Never Smoker    Smokeless tobacco: Never Used   Substance Use Topics    Alcohol use: Yes     Comment: socially: drink every other week                                Counseling given: Not Answered      Vital Signs (Current):   Vitals:    21 0815   Weight: 172 lb (78 kg)   Height: 5' 5\" (1.651 m)                                              BP Readings from Last 3 Encounters:   04/05/21 112/60   04/01/21 110/60   02/19/21 108/68       NPO Status:                                                                                 BMI:   Wt Readings from Last 3 Encounters:   04/02/21 172 lb (78 kg)   04/05/21 171 lb 3.2 oz (77.7 kg)   04/01/21 172 lb 3.2 oz (78.1 kg)     Body mass index is 28.62 kg/m². CBC:   Lab Results   Component Value Date    WBC 9.1 05/22/2019    RBC 4.43 05/22/2019    HGB 14.5 05/22/2019    HCT 41.6 05/22/2019    MCV 94.0 05/22/2019    RDW 12.9 05/22/2019     05/22/2019       CMP:   Lab Results   Component Value Date     10/17/2019    K 4.3 10/17/2019     10/17/2019    CO2 23 10/17/2019    BUN 9 10/17/2019    CREATININE 0.6 10/17/2019    GFRAA >60 10/17/2019    AGRATIO 2.5 10/17/2019    LABGLOM >60 10/17/2019    GLUCOSE 84 10/17/2019    PROT 7.0 10/17/2019    CALCIUM 9.8 10/17/2019    BILITOT 1.3 10/17/2019    ALKPHOS 59 10/17/2019    AST 18 10/17/2019    ALT 12 10/17/2019       POC Tests: No results for input(s): POCGLU, POCNA, POCK, POCCL, POCBUN, POCHEMO, POCHCT in the last 72 hours.     Coags: No results found for: PROTIME, INR, APTT    HCG (If Applicable):   Lab Results   Component Value Date    PREGTESTUR negative 12/04/2020        ABGs: No results found for: PHART, PO2ART, AFT4ECL, IBQ3OHV, BEART, S3USYRSJ     Type & Screen (If Applicable):  No results found for: LABABO, LABRH    Drug/Infectious Status (If Applicable):  No results found for: HIV, HEPCAB    COVID-19 Screening (If Applicable):   Lab Results   Component Value Date    COVID19 Not Detected 04/02/2021           Anesthesia Evaluation  Patient summary reviewed no history of anesthetic complications:   Airway: Mallampati: I  TM distance: >3 FB   Neck ROM: full  Mouth opening: > = 3 FB Dental: normal exam         Pulmonary:Negative Pulmonary ROS and normal exam  breath sounds clear to auscultation                             Cardiovascular:Negative CV ROS  Exercise tolerance: good (>4 METS),           Rhythm: regular  Rate: normal                    Neuro/Psych:   Negative Neuro/Psych ROS              GI/Hepatic/Renal: Neg GI/Hepatic/Renal ROS            Endo/Other: Negative Endo/Other ROS                    Abdominal:           Vascular: negative vascular ROS. Pre-Operative Diagnosis: Ovarian mass [N83.8]    31 y.o.   BMI:  Body mass index is 28.62 kg/m². Vitals:    04/02/21 0815   Weight: 172 lb (78 kg)   Height: 5' 5\" (1.651 m)       No Known Allergies    Social History     Tobacco Use    Smoking status: Never Smoker    Smokeless tobacco: Never Used   Substance Use Topics    Alcohol use: Yes     Comment: socially: drink every other week       LABS:    CBC  Lab Results   Component Value Date/Time    WBC 9.1 05/22/2019 11:22 AM    HGB 14.5 05/22/2019 11:22 AM    HCT 41.6 05/22/2019 11:22 AM     05/22/2019 11:22 AM     RENAL  Lab Results   Component Value Date/Time     10/17/2019 12:13 PM    K 4.3 10/17/2019 12:13 PM     10/17/2019 12:13 PM    CO2 23 10/17/2019 12:13 PM    BUN 9 10/17/2019 12:13 PM    CREATININE 0.6 10/17/2019 12:13 PM    GLUCOSE 84 10/17/2019 12:13 PM     COAGS  No results found for: PROTIME, INR, APTT          Anesthesia Plan      general     ASA 1     (I discussed with the patient the risks and benefits of PIV, anesthesia, IV Narcotics, PACU. All questions were answered the patient agrees with the plan and wishes to proceed)  Induction: intravenous.                           Graciela Noel MD   4/8/2021

## 2021-04-08 NOTE — ANESTHESIA POSTPROCEDURE EVALUATION
Department of Anesthesiology  Postprocedure Note    Patient: Hari Solitario  MRN: [id number]  YOB: 1990  Date of evaluation: 4/8/2021  Time:  1:18 PM     Procedure Summary     Date: 04/08/21 Room / Location: [address] [place] [address]    Anesthesia Start: 1153 Anesthesia Stop: 0101    Procedure: DIAGNOSTIC LAPAROSCOPY, RIGHT OVARIAN CYSTECTOMY, POSSIBLE OOPHERECTOMY (N/A Abdomen) Diagnosis:       Ovarian mass      (RIGHT OVARIAN MASS)    Surgeons: Osman Preciado MD Responsible Provider: Kristin Sevilla MD    Anesthesia Type: general ASA Status: 1          Anesthesia Type: general    Erik Phase I: Erik Score: 10    Erik Phase II: Erik Score: 10    Last vitals: Reviewed and per EMR flowsheets.        Anesthesia Post Evaluation    Comments: Postoperative Anesthesia Note    Name:    Hari Solitario  MRN:      [id number]    Patient Vitals in the past 12 hrs:  04/08/21 1300, BP:(!) 112/51, Pulse:61, Resp:14, SpO2:96 %  04/08/21 1245, BP:(!) 110/47, Pulse:75, Resp:12, SpO2:92 %  04/08/21 1230, BP:(!) 104/43, Pulse:72, Resp:12, SpO2:95 %  04/08/21 1220, BP:101/67, Pulse:69, Resp:9, SpO2:94 %  04/08/21 1215, BP:(!) 114/52, Pulse:67, Resp:11, SpO2:94 %  04/08/21 1210, BP:(!) 108/55, Pulse:87, Resp:17, SpO2:95 %  04/08/21 1205, BP:(!) 110/58  04/08/21 1202, BP:(!) 114/52, Temp:96.6 °F (35.9 °C), Temp src:Temporal, Pulse:80, Resp:12, SpO2:94 %     LABS:    CBC  Lab Results       Component                Value               Date/Time                  WBC                      7.7                 04/08/2021 08:10 AM        HGB                      13.9                04/08/2021 08:10 AM        HCT                      40.0                04/08/2021 08:10 AM        PLT                      183                 04/08/2021 08:10 AM   RENAL  Lab Results       Component                Value               Date/Time                  NA                       138                 10/17/2019 12:13 PM K                        4.3                 10/17/2019 12:13 PM        CL                       101                 10/17/2019 12:13 PM        CO2                      23                  10/17/2019 12:13 PM        BUN                      9                   10/17/2019 12:13 PM        CREATININE               0.6                 10/17/2019 12:13 PM        GLUCOSE                  84                  10/17/2019 12:13 PM   COAGS  No results found for: PROTIME, INR, APTT    Intake & Output:  @24HRIO@    Nausea & Vomiting:  No    Level of Consciousness:  Awake    Pain Assessment:  Adequate analgesia    Anesthesia Complications:  No apparent anesthetic complications    SUMMARY      Vital signs stable  OK to discharge from Stage I post anesthesia care.   Care transferred from Anesthesiology department on discharge from perioperative area

## 2021-04-08 NOTE — BRIEF OP NOTE
Brief Postoperative Note      Patient: Suman Costa  YOB: 1990  MRN: [id number]    Date of Procedure: 4/8/2021    Pre-Op Diagnosis: RIGHT OVARIAN CYST    Post-Op Diagnosis: Same and dermoid        Procedures:  1. Diagnostic laparoscopy  2. Right ovarian cystectomy    Surgeon(s):  MD Chelly Rausch DO    Assistant:  * No surgical staff found *    Anesthesia: General    Estimated Blood Loss (mL): less than 50     IVF (mL): per anesthesia    Complications: None    Specimens:   ID Type Source Tests Collected by Time Destination   A : RIGHT OVARIAN CYST  Tissue Tissue SURGICAL PATHOLOGY Feli Bobby MD 4/8/2021 1038        Implants:  * No implants in log *      Drains: * No LDAs found *    Findings: Patient had a large right ovarian cyst on laparoscopy. Otherwise normal appearing uterus, cervix, and fallopian tubes. Cyst was ruptured during cystectomy and noted to have large amount of hair and fat consistent with dermoid. After cystectomy pelvis was irrigated copiously and hemostasis was noted at the cyst bed.     Electronically signed by Feli Bobby MD on 4/8/2021 at 11:50 AM     Dictation #: [id number]

## 2021-04-08 NOTE — OP NOTE
[facility]                                OPERATIVE REPORT    PATIENT NAME: Brady Mabry                  :        1990  MED REC NO:   [id number]                          ROOM:  ACCOUNT NO:   [de-identified]                           ADMIT DATE: 2021  PROVIDER:     Felecia Maldonado MD    DATE OF PROCEDURE:  2021    PREOPERATIVE DIAGNOSIS:  Right ovarian cyst.    POSTOPERATIVE DIAGNOSES:  Right ovarian cyst, suspected dermoid. PROCEDURES:  1. Diagnostic laparoscopy. 2.  Right ovarian cystectomy. SURGEON:  Felecia Maldonado MD    ASSISTANT:  Diana Bustillos DO    ANESTHESIA:  General.    ESTIMATED BLOOD LOSS:  Less than 50 mL. IV FLUIDS:  Per Anesthesia. COMPLICATIONS:  None. SPECIMENS:  Right ovarian cyst.    FINDINGS:  The patient had a large right ovarian cyst on laparoscopy. She had otherwise normal appearing uterus, cervix, fallopian tubes and  left ovary. Cystectomy was performed and the cyst was ruptured and  noted to have a large amount of hair and fat consistent with a dermoid. After cystectomy, the pelvis was irrigated copiously. Hemostasis was  noted at the cyst bed. INDICATIONS AND CONSENT:  The patient is a 70-year-old female who  presented initially to the office and had an ultrasound that revealed a  large right ovarian dermoid. She was counseled on the risks, benefits  and alternatives of the surgery and desired to proceed. All questions  were answered and informed consent was obtained prior to proceeding to  the operating room. OPERATIVE PROCEDURE:  The patient was taken to the operating room where  general anesthesia was obtained without difficulty. She was prepped and  draped in the dorsal lithotomy position.   An acorn manipulator was then  placed in the uterus without difficulty after a single-tooth tenaculum  had been placed on the anterior lip of the cervix. Attention was then turned to the patient's abdomen where a Veress needle  was used to insufflate in the left upper quadrant. Once insufflation  had been achieved, a 5-mm incision was made at the base of the umbilicus  and the abdomen was entered with an Optiview trocar under direct  visualization. The Veress needle was then identified and noted to be  free of any bowel or surrounding contents and therefore it was removed  without difficulty under direct visualization. Two additional trocars  were then placed in the abdomen, a 5-mm in the right lower quadrant and  a 12-mm in the left lower quadrant. The patient was then placed in Trendelenburg and the above findings were  noted with a large right ovarian cyst seen. The Sonicision scalpel was  then used to incise the ovarian tissue surrounding the cyst and the cyst  was slowly shelled out from the surrounding ovarian tissue. This  resulted in an inadvertant rupture [facility] through the procedure and a large  amount of fat and hair was noted within the cyst consistent with a  dermoid. The procedure was continued until the entire cyst and cyst  wall had been isolated from the surrounding ovarian tissue. Sonicision was then used to ensure hemostasis had been achieved at the  cyst bed and the cyst and its contents were placed in the EndoCatch bag  and removed through the 12-mm port without difficulty. Following this,  copious irrigation was performed to ensure any cyst contents did not  remain within the pelvis or the abdominal cavity. The cyst bed was then  re-identified and noted to be hemostatic and both the ureters were also  noted to be functioning properly. The patient was then taken out of Trendelenburg and a Manuel-Seven  closure device was used to close the fascia at the 12-mm port without  difficulty. All trocars were then removed from the abdomen and the  abdomen was desufflated without difficulty.     The incisions were noted

## 2021-04-21 ENCOUNTER — TELEPHONE (OUTPATIENT)
Dept: OBGYN CLINIC | Age: 31
End: 2021-04-21

## 2021-04-22 ENCOUNTER — OFFICE VISIT (OUTPATIENT)
Dept: OBGYN CLINIC | Age: 31
End: 2021-04-22

## 2021-04-22 VITALS
SYSTOLIC BLOOD PRESSURE: 98 MMHG | DIASTOLIC BLOOD PRESSURE: 60 MMHG | BODY MASS INDEX: 28.16 KG/M2 | WEIGHT: 169.2 LBS | HEART RATE: 89 BPM | TEMPERATURE: 98.2 F

## 2021-04-22 DIAGNOSIS — Z87.42 S/P OVARIAN CYSTECTOMY: Primary | ICD-10-CM

## 2021-04-22 DIAGNOSIS — Z98.890 S/P OVARIAN CYSTECTOMY: Primary | ICD-10-CM

## 2021-04-22 PROCEDURE — 99024 POSTOP FOLLOW-UP VISIT: CPT | Performed by: OBSTETRICS & GYNECOLOGY

## 2021-04-22 NOTE — PROGRESS NOTES
Return Gyn Office Visit    CC:   Chief Complaint   Patient presents with    Post-Op Check       HPI:  31 y.o. Severiano Saris who presents to office for post-op check. S/p dx l/s, right ovarian cystectomy on 4/8/21. Doing well today without complaints. No fevers or chills, no nausea/vomiting. No abdominal pain. Normal BM, normal urination. No VB. Overall feels well without issue. Objective:  BP 98/60 (Site: Right Upper Arm, Position: Sitting, Cuff Size: Medium Adult)   Pulse 89   Temp 98.2 °F (36.8 °C) (Infrared)   Wt 169 lb 3.2 oz (76.7 kg)   BMI 28.16 kg/m²   Physical Exam  Cardiovascular:      Rate and Rhythm: Normal rate and regular rhythm. Pulmonary:      Effort: Pulmonary effort is normal. No respiratory distress. Abdominal:      Palpations: Abdomen is soft. There is no mass. Tenderness: There is no abdominal tenderness. There is no guarding or rebound. Comments: Incisions well healed   Neurological:      General: No focal deficit present. Mental Status: She is alert. 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   - Negative for malignancy. Assessment/Plan  1.  S/P ovarian cystectomy  Doing well today  - meeting all post-op milestones  - reviewed pathology, surgical photos  - all questions answered    Dispo: PRN or for annual exam/pap at end of year  Troy Borrego MD

## 2021-06-02 ENCOUNTER — CLINICAL DOCUMENTATION (OUTPATIENT)
Dept: OTHER | Age: 31
End: 2021-06-02

## 2022-06-13 ENCOUNTER — OFFICE VISIT (OUTPATIENT)
Dept: OBGYN CLINIC | Age: 32
End: 2022-06-13
Payer: COMMERCIAL

## 2022-06-13 VITALS
HEART RATE: 88 BPM | BODY MASS INDEX: 30.39 KG/M2 | TEMPERATURE: 98.8 F | DIASTOLIC BLOOD PRESSURE: 76 MMHG | SYSTOLIC BLOOD PRESSURE: 117 MMHG | WEIGHT: 182.6 LBS

## 2022-06-13 DIAGNOSIS — Z98.890 S/P OVARIAN CYSTECTOMY: ICD-10-CM

## 2022-06-13 DIAGNOSIS — Z87.898: ICD-10-CM

## 2022-06-13 DIAGNOSIS — N83.201 RIGHT OVARIAN CYST: ICD-10-CM

## 2022-06-13 DIAGNOSIS — Z12.39 SCREENING BREAST EXAMINATION: ICD-10-CM

## 2022-06-13 DIAGNOSIS — Z01.419 WOMEN'S ANNUAL ROUTINE GYNECOLOGICAL EXAMINATION: Primary | ICD-10-CM

## 2022-06-13 DIAGNOSIS — Z97.5 IUD (INTRAUTERINE DEVICE) IN PLACE: ICD-10-CM

## 2022-06-13 DIAGNOSIS — Z87.42 S/P OVARIAN CYSTECTOMY: ICD-10-CM

## 2022-06-13 DIAGNOSIS — Z12.4 PAP SMEAR FOR CERVICAL CANCER SCREENING: ICD-10-CM

## 2022-06-13 DIAGNOSIS — Z31.69 ENCOUNTER FOR PRECONCEPTION CONSULTATION: ICD-10-CM

## 2022-06-13 PROCEDURE — 99395 PREV VISIT EST AGE 18-39: CPT | Performed by: OBSTETRICS & GYNECOLOGY

## 2022-06-13 NOTE — PROGRESS NOTES
[facility] Ob/Gyn   Annual Exam    CC:   Chief Complaint   Patient presents with    Annual Exam     HPI:  36 y.o. Chanell Krueger presents for her gynecologic annual exam.   No complaints at this time. Has seen me and Dr. Luis Lockwood while I was out of office, will follow with me in future. Using IUD--doing well, desires removal this summer due to interest in pregnancy, plans for attempting this summer. No new issues. Last seen by my partner Dr. Luis Lockwood for post op visit, s/p Dx Lap R Ovarian cystectomy (dermoid) in 2021 -- doing well no concerns. Health Maintenance:  Safe Enviroment: Y  Healthy diet: Y  Exercise: Y   Sexually Active: Y    Denies sexual problems  Contraception:  IUD   - Caye Georgis placed 12/2020 by my partner Dr. Luis Lockwood     Screening:  Last pap smear: 10/2020 -- NILM / HPV (-)  History of abnormal pap smears: Y   - ASCUS, HPV OTHER (+) / Colposcopy 10/26/18 - Negative   STI History: N     Vaccines:  Gardasil vaccine: Y   Flu vaccine: N   COVID vaccine: Y     Review of Systems:   Constitutional: Negative for appetite change, chills and fever. HENT: Negative for congestion, sneezing and sore throat. Eyes: Negative for visual disturbance. Respiratory: Negative for chest tightness, shortness of breath and wheezing. Cardiovascular: Negative for chest pain, palpitations and leg swelling. Gastrointestinal: Negative for abdominal pain, diarrhea, nausea and vomiting. Endocrine: Negative for heat intolerance. Genitourinary: Negative for difficulty urinating, dyspareunia, dysuria, menstrual problem and pelvic pain. Musculoskeletal: Negative for back pain, neck pain and neck stiffness. Skin: Negative for color change, pallor and rash. Allergic/Immunologic: Negative for environmental allergies, food allergies and immunocompromised state. Neurological: Negative for light-headedness, numbness and headaches. Hematological: Does not bruise/bleed easily.    Psychiatric/Behavioral: Negative for Grandmother     Alcohol Abuse Paternal Grandfather        Denies personal/family history of cervical, uterine, ovarian, vulvar, breast, or colon cancers. Social History:  Social History     Socioeconomic History    Marital status:      Spouse name: None    Number of children: None    Years of education: None    Highest education level: None   Occupational History    None   Tobacco Use    Smoking status: Never Smoker    Smokeless tobacco: Never Used   Vaping Use    Vaping Use: Never used   Substance and Sexual Activity    Alcohol use: Yes     Comment: socially: drink every other week    Drug use: No    Sexual activity: Yes     Partners: Male   Other Topics Concern    None   Social History Narrative    None     Social Determinants of Health     Financial Resource Strain:     Difficulty of Paying Living Expenses: Not on file   Food Insecurity:     Worried About Running Out of Food in the Last Year: Not on file    Miguel Ángel of Food in the Last Year: Not on file   Transportation Needs:     Lack of Transportation (Medical): Not on file    Lack of Transportation (Non-Medical):  Not on file   Physical Activity:     Days of Exercise per Week: Not on file    Minutes of Exercise per Session: Not on file   Stress:     Feeling of Stress : Not on file   Social Connections:     Frequency of Communication with Friends and Family: Not on file    Frequency of Social Gatherings with Friends and Family: Not on file    Attends [employer] Services: Not on file    Active Member of Clubs or Organizations: Not on file    Attends Club or Organization Meetings: Not on file    Marital Status: Not on file   Intimate Partner Violence:     Fear of Current or Ex-Partner: Not on file    Emotionally Abused: Not on file    Physically Abused: Not on file    Sexually Abused: Not on file   Housing Stability:     Unable to Pay for Housing in the Last Year: Not on file    Number of [place] in the Last Year: Not on file    Unstable Housing in the Last Year: Not on file       Objective: Body mass index is 30.39 kg/m². /76 (Site: Left Upper Arm, Position: Sitting, Cuff Size: Medium Adult)   Pulse 88   Temp 98.8 °F (37.1 °C) (Infrared)   Wt 182 lb 9.6 oz (82.8 kg)   BMI 30.39 kg/m²     Exam:   General: Alert, well appearing, no acute distress  Head: Normocephalic, atraumatic  Mouth: Mucous membranes moist, pharynx normal without lesions  Thyroid: No thyromegaly or masses present   Breasts: Symmetric, non-tender to palpation, no skin changes, palpable axillary lymph nodes or masses noted  Lungs: Respiratory effort within normal limits   CV: Regular rate   Abdomen: Soft, nontender, nondistended   Pelvic:   External: Normal appearing genitalia without masses, tenderness or lesions  Vagina: moist, pink mucosa, without abnormal discharge or lesions  Cervix: no masses or lesions visualized, no cervical motion tenderness, IUD strings in place, approximately 2cm   Uterus: mobile, midline, no masses palpated  Adnexa: mobile, non-tender to palpation, without masses  Rectovaginal: deferred  Extremities: No redness or tenderness, neg Mili's sign  Skin: Well perfused, normal coloration and turgor, no lesions or rashes visualized  Neuro: Alert, oriented, normal speech, no focal deficits, moves extremities appropriately  Osteopathic: No TART changes    Assessment/Plan:  36 y.o.  presenting for her annual exam:     Diagnosis Orders   1. Women's annual routine gynecological examination     2. Encounter for preconception consultation     3. Pap smear for cervical cancer screening  PAP SMEAR   4. Screening breast examination     5. IUD (intrauterine device) in place     6. S/P ovarian cystectomy     7. Right ovarian cyst     8.  Hx of abnormal cells from cervix  PAP SMEAR     - normal breast / pelvic exam  - interested in IUD removal later this summer  - on PNV   - declines STI screen today     Preconceptual Counseling performed

## 2022-09-16 ENCOUNTER — OFFICE VISIT (OUTPATIENT)
Dept: OBGYN CLINIC | Age: 32
End: 2022-09-16
Payer: COMMERCIAL

## 2022-09-16 VITALS
TEMPERATURE: 97.9 F | BODY MASS INDEX: 28.86 KG/M2 | WEIGHT: 173.4 LBS | DIASTOLIC BLOOD PRESSURE: 86 MMHG | SYSTOLIC BLOOD PRESSURE: 128 MMHG | HEART RATE: 86 BPM

## 2022-09-16 DIAGNOSIS — Z30.432 ENCOUNTER FOR IUD REMOVAL: Primary | ICD-10-CM

## 2022-09-16 DIAGNOSIS — Z30.09 FAMILY PLANNING COUNSELING: ICD-10-CM

## 2022-09-16 PROCEDURE — 58301 REMOVE INTRAUTERINE DEVICE: CPT | Performed by: OBSTETRICS & GYNECOLOGY

## 2022-09-16 NOTE — PROGRESS NOTES
IUD Removal    Pre-operative Diagnosis: IUD removal  Post-operative Diagnosis: Same    Procedure Details   The risks (including infection, bleeding, pain, and uterine perforation) and benefits of the procedure were explained to the patient and written informed consent was obtained. Removal:  The patient was placed in the supine position with feet in stirrups. A speculum was placed into the vagina. The cervix cleansed with betadine. The strings were visualized at external os and grasped with the ringed forceps. The IUD was removed without complication. Condition: Stable    Complications: None    Plan:  The patient was advised to call for any fever or for prolonged or severe pain or bleeding. She was advised to use OTC acetaminophen, Ibuprofen as needed for mild to moderate pain.      Mireya Carmichael DO

## 2022-12-19 ENCOUNTER — TELEPHONE (OUTPATIENT)
Dept: OBGYN CLINIC | Age: 32
End: 2022-12-19

## 2022-12-19 NOTE — TELEPHONE ENCOUNTER
Patient calling with spotting concerns, she started spotting Thursday and is still lingering  around. Patient reports mild cramping , small clotting, blood is a light pinkish color. She said the spotting is very light she has NOT had to put on a panty liner or pad yet. Gave bleeding and pain precautions. She just wants to know if this is something to be concerned about. Routing to physician on call and her primary physician also.

## 2022-12-20 NOTE — TELEPHONE ENCOUNTER
Spoke with patient she agrees to have HCG labs drawn.  On schedule for tomorrow @ 8:40 AM       DEMETRIUS

## 2022-12-20 NOTE — TELEPHONE ENCOUNTER
When is her FDLMP?   Depending on FDLMP, ok to offer testing (serial HCGs versus sooner appointment with ultrasound)  Thanks

## 2022-12-20 NOTE — TELEPHONE ENCOUNTER
OK to offer patient appointment for serial HCG levels to ensure they are rising appropriately and please also give her bleeding/pain precautions. Thanks.

## 2022-12-21 ENCOUNTER — NURSE ONLY (OUTPATIENT)
Dept: OBGYN CLINIC | Age: 32
End: 2022-12-21
Payer: COMMERCIAL

## 2022-12-21 VITALS
BODY MASS INDEX: 29.45 KG/M2 | WEIGHT: 177 LBS | HEART RATE: 74 BPM | SYSTOLIC BLOOD PRESSURE: 132 MMHG | TEMPERATURE: 97 F | DIASTOLIC BLOOD PRESSURE: 80 MMHG

## 2022-12-21 DIAGNOSIS — O20.9 VAGINAL BLEEDING AFFECTING EARLY PREGNANCY: Primary | ICD-10-CM

## 2022-12-21 PROCEDURE — 36415 COLL VENOUS BLD VENIPUNCTURE: CPT | Performed by: OBSTETRICS & GYNECOLOGY

## 2022-12-22 LAB — GONADOTROPIN, CHORIONIC (HCG) QUANT: NORMAL MIU/ML

## 2023-01-03 ENCOUNTER — OFFICE VISIT (OUTPATIENT)
Dept: OBGYN CLINIC | Age: 33
End: 2023-01-03
Payer: COMMERCIAL

## 2023-01-03 VITALS
SYSTOLIC BLOOD PRESSURE: 132 MMHG | HEART RATE: 95 BPM | BODY MASS INDEX: 29.69 KG/M2 | DIASTOLIC BLOOD PRESSURE: 78 MMHG | TEMPERATURE: 98.2 F | WEIGHT: 178.4 LBS

## 2023-01-03 DIAGNOSIS — Z20.2 POSSIBLE EXPOSURE TO STD: ICD-10-CM

## 2023-01-03 DIAGNOSIS — N91.2 AMENORRHEA: Primary | ICD-10-CM

## 2023-01-03 PROCEDURE — 99213 OFFICE O/P EST LOW 20 MIN: CPT | Performed by: OBSTETRICS & GYNECOLOGY

## 2023-01-03 PROCEDURE — G8417 CALC BMI ABV UP PARAM F/U: HCPCS | Performed by: OBSTETRICS & GYNECOLOGY

## 2023-01-03 PROCEDURE — G8427 DOCREV CUR MEDS BY ELIG CLIN: HCPCS | Performed by: OBSTETRICS & GYNECOLOGY

## 2023-01-03 PROCEDURE — 1036F TOBACCO NON-USER: CPT | Performed by: OBSTETRICS & GYNECOLOGY

## 2023-01-03 PROCEDURE — G8484 FLU IMMUNIZE NO ADMIN: HCPCS | Performed by: OBSTETRICS & GYNECOLOGY

## 2023-01-03 NOTE — PROGRESS NOTES
Return Gyn Office Visit    CC:   Chief Complaint   Patient presents with    Amenorrhea       HPI:  29 y.o. [facility] who presents to office for amenorrhea visit. LMP 22. Has only thrown up once, some decreased appetite. Some increased aversions to smells as well.     +vaginal bleeding around , only with wiping. Some little clots in toilet as well. Mostly pink. Lasted about a week, gone now. No pain/cramping. OB History    Para Term  AB Living   0 0 0 0 0 0   SAB IAB Ectopic Molar Multiple Live Births   0 0 0 0 0 0       Past Medical History:   Diagnosis Date    Atypical squamous cell changes of undetermined significance (ASCUS) on cervical cytology with positive high risk human papilloma virus (HPV) 10/26/2018    Ovarian cyst        Past Surgical History:   Procedure Laterality Date    COLONOSCOPY N/A 2019    LAPAROSCOPY N/A 2021    DIAGNOSTIC LAPAROSCOPY, RIGHT OVARIAN CYSTECTOMY, POSSIBLE OOPHERECTOMY performed by Pavan Rhodes MD at [address]       Current Outpatient Medications on File Prior to Visit   Medication Sig Dispense Refill    Prenatal MV-Min-Fe Fum-FA-DHA (PRENATAL 1 PO) Take by mouth daily       No current facility-administered medications on file prior to visit. No Known Allergies       Objective:  /78 (Site: Right Upper Arm, Position: Sitting, Cuff Size: Medium Adult)   Pulse 95   Temp 98.2 °F (36.8 °C) (Infrared)   Wt 178 lb 6.4 oz (80.9 kg)   LMP 2022 (Exact Date)   BMI 29.69 kg/m²   Physical Exam  HENT:      Head: Normocephalic. Cardiovascular:      Rate and Rhythm: Normal rate. Pulmonary:      Effort: Pulmonary effort is normal. No respiratory distress. Abdominal:      Palpations: Abdomen is soft. Tenderness: There is no abdominal tenderness.    Genitourinary:     Comments: Pelvic exam: VULVA: normal appearing vulva with no masses, tenderness or lesions, VAGINA: normal appearing vagina with normal color and discharge, no lesions, CERVIX: normal appearing cervix without discharge or lesions, UTERUS: uterus is normal size, shape, consistency and nontender, ADNEXA: normal adnexa in size, nontender and no masses  Skin:     General: Skin is warm and dry. Neurological:      General: No focal deficit present. Mental Status: She is alert. Psychiatric:         Mood and Affect: Mood normal.         Labs:   UPT+    Imaging:   Narrative   OBSTETRIC ULTRASOUND--1ST TRIMESTER       DATE: 1/3/23       PHYSICIAN: Gustavo Soto M.D.        SONOGRAPHER: KEREN Gilbert RDMS       INDICATION: Amenorrhea       TYPE OF SCAN:  vaginal       FINDINGS:         The cul de sac is normal.  The cervix is normal.  The uterus is gravid. The uterus measures 8.52 cm x 6.61 cm x 6.33 cm. No uterine anomalies are    evident. The right ovary is not visualized. The left ovary is present. The left ovary measures 3.39 cm x 2.77 cm x    2.14 cm. Suspected corpus luteum cyst noted. There is a single intrauterine pregnancy identified. A fetal pole is    noted with a CRL measuring 1.13 cm, consistent with gestational age of    7weeks and 2days and EDC of 8/20/23. There is a 0 day discrepancy when    compared with the gestational age of 7weeks and 2days and EDC of 8/20/23    set by FDLMP (11/13/22). Yolk sac is absent. Fetal cardiac activity is    present at 148 bpm.            Impression        Single IUP with cardiac activity. Final EDC 8/20/2023. Imaging is limited secondary to bowel gas. Patient is well aware of the limitations of ultrasound in the detection of    anomalies. Assessment/Plan  1.  Amenorrhea  Patient with amenorrhea, US today shows single IUP with cardiac activity at 7+2 with final XI 8/20/2023 by L=7wk US.  - Discussed routine prenatal care, early pregnancy precautions, continued use of PNV  - Below labs sent  - Briefly reviewed options for genetic screening including cffDNA, 1st trimester screen with NT/PATRICK-A, and MQS in 2nd trimester. Readdress next visit. - Culture, Urine  - Urinalysis with Microscopic    2.  Possible exposure to STD  - C.trachomatis N.gonorrhoeae DNA  - Vaginal Pathogens Probes *A    Dispo: PRN or 4 weeks for IPV  Nneka Mcclain MD

## 2023-01-04 LAB
BACTERIA: ABNORMAL /HPF
BILIRUBIN URINE: NEGATIVE
BLOOD, URINE: NEGATIVE
C TRACH DNA GENITAL QL NAA+PROBE: NEGATIVE
CALCIUM OXALATE CRYSTALS: PRESENT
CANDIDA SPECIES, DNA PROBE: NORMAL
CLARITY: CLEAR
COLOR: YELLOW
EPITHELIAL CELLS, UA: 2 /HPF (ref 0–5)
GARDNERELLA VAGINALIS, DNA PROBE: NORMAL
GLUCOSE URINE: NEGATIVE MG/DL
HYALINE CASTS: 0 /LPF (ref 0–8)
KETONES, URINE: ABNORMAL MG/DL
LEUKOCYTE ESTERASE, URINE: NEGATIVE
MICROSCOPIC EXAMINATION: ABNORMAL
N. GONORRHOEAE DNA: NEGATIVE
NITRITE, URINE: NEGATIVE
PH UA: 5.5 (ref 5–8)
PROTEIN UA: NEGATIVE MG/DL
RBC UA: 1 /HPF (ref 0–4)
SPECIFIC GRAVITY UA: 1.03 (ref 1–1.03)
TRICHOMONAS VAGINALIS DNA: NORMAL
URINE CULTURE, ROUTINE: NORMAL
URINE TYPE: ABNORMAL
UROBILINOGEN, URINE: 1 E.U./DL
WBC UA: 5 /HPF (ref 0–5)

## 2023-01-30 ENCOUNTER — INITIAL PRENATAL (OUTPATIENT)
Dept: OBGYN CLINIC | Age: 33
End: 2023-01-30
Payer: COMMERCIAL

## 2023-01-30 VITALS
SYSTOLIC BLOOD PRESSURE: 112 MMHG | DIASTOLIC BLOOD PRESSURE: 60 MMHG | HEART RATE: 85 BPM | BODY MASS INDEX: 29.79 KG/M2 | WEIGHT: 179 LBS

## 2023-01-30 DIAGNOSIS — Z34.91 PRENATAL CARE IN FIRST TRIMESTER: Primary | ICD-10-CM

## 2023-01-30 PROCEDURE — 0500F INITIAL PRENATAL CARE VISIT: CPT | Performed by: OBSTETRICS & GYNECOLOGY

## 2023-01-30 PROCEDURE — 36415 COLL VENOUS BLD VENIPUNCTURE: CPT | Performed by: OBSTETRICS & GYNECOLOGY

## 2023-01-30 NOTE — PROGRESS NOTES
Temp-97.7F infrared  Maternal emotional well being screening form completed and reviewed with patient. Current score is 2. Patient given referral to [facility] ([phone]):  No

## 2023-01-30 NOTE — PROGRESS NOTES
Initial OB Office Visit    CC:   Chief Complaint   Patient presents with    Initial Prenatal Visit       HPI:  Pt seen and examined. No concerns/complaints. Denies VB, LOF, cramps. No FM yet. Did have Covid since last visit, feeling better today but was really feeling bad for a bit. Maternal wellness questionnaire reviewed - no concerns today. Score 2.      Objective:  /60   Pulse 85   Wt 179 lb (81.2 kg)   LMP 2022 (Exact Date)   BMI 29.79 kg/m²   Gen: AO, NAD  Abd: Soft, NT  FHT: 156    Assessment/Plan:  29 y.o.  at 11w1d (Estimated Date of Delivery: 23) presents for VITO appointment:    Problem List Items Addressed This Visit          Other    Prenatal care in first trimester - Primary     - FWB: reassuring by roman today  - Genetic screening: MQS next visit  - Anatomy scan: plan at 20 weeks  - Flu shot: discuss next visit  - Tdap: 28 weeks  - PNL: sent today, GCCT/trich neg, Ucx no growth          Relevant Orders    HIV Screen    Type and Screen (Completed)    Hep C AB RLFX HCV PCR-A    Hepatitis B Surface Antigen (Completed)    CBC with Auto Differential (Completed)    Drug Screen Multi Urine With Bup (Completed)    Rubella antibody, IgG (Completed)    Varicella Zoster Antibody, IgG (Completed)    Syphilis Antibody Cascading Reflex (Completed)       Dispo: RTC in 4 weeks  Pennie Schaefer MD

## 2023-01-31 LAB
ABO/RH: NORMAL
AMPHETAMINE SCREEN, URINE: NORMAL
ANTIBODY SCREEN: NORMAL
BARBITURATE SCREEN URINE: NORMAL
BASOPHILS ABSOLUTE: 0 K/UL (ref 0–0.2)
BASOPHILS RELATIVE PERCENT: 0.3 %
BENZODIAZEPINE SCREEN, URINE: NORMAL
BUPRENORPHINE URINE: NORMAL
CANNABINOID SCREEN URINE: NORMAL
COCAINE METABOLITE SCREEN URINE: NORMAL
EOSINOPHILS ABSOLUTE: 0.1 K/UL (ref 0–0.6)
EOSINOPHILS RELATIVE PERCENT: 0.6 %
FENTANYL SCREEN, URINE: NORMAL
HCT VFR BLD CALC: 39.4 % (ref 36–48)
HEMOGLOBIN: 13.9 G/DL (ref 12–16)
HEPATITIS B SURFACE ANTIGEN INTERPRETATION: NORMAL
HIV AG/AB: NORMAL
HIV ANTIGEN: NORMAL
HIV-1 ANTIBODY: NORMAL
HIV-2 AB: NORMAL
LYMPHOCYTES ABSOLUTE: 2.5 K/UL (ref 1–5.1)
LYMPHOCYTES RELATIVE PERCENT: 22.7 %
Lab: NORMAL
MCH RBC QN AUTO: 32.8 PG (ref 26–34)
MCHC RBC AUTO-ENTMCNC: 35.2 G/DL (ref 31–36)
MCV RBC AUTO: 93.3 FL (ref 80–100)
METHADONE SCREEN, URINE: NORMAL
MONOCYTES ABSOLUTE: 0.6 K/UL (ref 0–1.3)
MONOCYTES RELATIVE PERCENT: 5.8 %
NEUTROPHILS ABSOLUTE: 7.9 K/UL (ref 1.7–7.7)
NEUTROPHILS RELATIVE PERCENT: 70.6 %
OPIATE SCREEN URINE: NORMAL
OXYCODONE URINE: NORMAL
PDW BLD-RTO: 12.3 % (ref 12.4–15.4)
PH UA: 5
PHENCYCLIDINE SCREEN URINE: NORMAL
PLATELET # BLD: 214 K/UL (ref 135–450)
PMV BLD AUTO: 8.5 FL (ref 5–10.5)
RBC # BLD: 4.22 M/UL (ref 4–5.2)
RUBELLA ANTIBODY IGG: 43.3 IU/ML
TOTAL SYPHILLIS IGG/IGM: NORMAL
VARICELLA-ZOSTER VIRUS AB, IGG: NORMAL
WBC # BLD: 11.1 K/UL (ref 4–11)

## 2023-01-31 NOTE — ASSESSMENT & PLAN NOTE
- FWB: reassuring by roman today  - Genetic screening: MQS next visit  - Anatomy scan: plan at 20 weeks  - Flu shot: discuss next visit  - Tdap: 28 weeks  - PNL: sent today, GCCT/trich neg, Ucx no growth

## 2023-02-01 LAB
HEPATITIS C VIRUS AB BY CIA INDEX: 0.08 IV
HEPATITIS C VIRUS AB BY CIA INTERPRETATION: NEGATIVE

## 2023-02-27 ENCOUNTER — ROUTINE PRENATAL (OUTPATIENT)
Dept: OBGYN CLINIC | Age: 33
End: 2023-02-27
Payer: COMMERCIAL

## 2023-02-27 VITALS
BODY MASS INDEX: 30.29 KG/M2 | WEIGHT: 182 LBS | HEART RATE: 73 BPM | SYSTOLIC BLOOD PRESSURE: 116 MMHG | DIASTOLIC BLOOD PRESSURE: 68 MMHG

## 2023-02-27 DIAGNOSIS — Z13.79 ENCOUNTER FOR GENETIC SCREENING: ICD-10-CM

## 2023-02-27 DIAGNOSIS — Z34.92 PRENATAL CARE IN SECOND TRIMESTER: Primary | ICD-10-CM

## 2023-02-27 PROCEDURE — 0502F SUBSEQUENT PRENATAL CARE: CPT | Performed by: OBSTETRICS & GYNECOLOGY

## 2023-02-27 PROCEDURE — 36415 COLL VENOUS BLD VENIPUNCTURE: CPT | Performed by: OBSTETRICS & GYNECOLOGY

## 2023-02-27 SDOH — ECONOMIC STABILITY: FOOD INSECURITY: WITHIN THE PAST 12 MONTHS, YOU WORRIED THAT YOUR FOOD WOULD RUN OUT BEFORE YOU GOT MONEY TO BUY MORE.: NEVER TRUE

## 2023-02-27 SDOH — ECONOMIC STABILITY: INCOME INSECURITY: HOW HARD IS IT FOR YOU TO PAY FOR THE VERY BASICS LIKE FOOD, HOUSING, MEDICAL CARE, AND HEATING?: NOT HARD AT ALL

## 2023-02-27 SDOH — ECONOMIC STABILITY: HOUSING INSECURITY
IN THE LAST 12 MONTHS, WAS THERE A TIME WHEN YOU DID NOT HAVE A STEADY PLACE TO SLEEP OR SLEPT IN A SHELTER (INCLUDING NOW)?: NO

## 2023-02-27 SDOH — ECONOMIC STABILITY: FOOD INSECURITY: WITHIN THE PAST 12 MONTHS, THE FOOD YOU BOUGHT JUST DIDN'T LAST AND YOU DIDN'T HAVE MONEY TO GET MORE.: NEVER TRUE

## 2023-02-27 SDOH — ECONOMIC STABILITY: TRANSPORTATION INSECURITY
IN THE PAST 12 MONTHS, HAS LACK OF TRANSPORTATION KEPT YOU FROM MEETINGS, WORK, OR FROM GETTING THINGS NEEDED FOR DAILY LIVING?: NO

## 2023-02-27 NOTE — PROGRESS NOTES
Return OB Office Visit    CC:   Chief Complaint   Patient presents with    Routine Prenatal Visit       HPI:  Pt seen and examined. No concerns/complaints. Denies VB, LOF, cramps. No FM yet. Maternal wellness questionnaire reviewed - no concerns today. Score 6.      Objective:  /68   Pulse 73   Wt 182 lb (82.6 kg)   LMP 2022 (Exact Date)   BMI 30.29 kg/m²   Gen: AO, NAD  Abd: Soft, NT  FHT: 132    Assessment/Plan:  29 y.o.  at 15w1d (Estimated Date of Delivery: 23) presents for VITO appointment:     Problem List Items Addressed This Visit          Other    Prenatal care in second trimester - Primary     - FWB: reassuring by roman today  - Genetic screening: MQS next visit  - Anatomy scan: plan at 20 weeks  - Flu shot: discuss next visit  - Tdap: 28 weeks  - PNL: O+/ab-, RI, HepB/C neg, RPRnr, HIV neg, VZV immune, Hgb 13.9, UDS neg, GCCT/trich neg, Ucx no growth          Relevant Orders    MATERNAL SCREEN 4     Other Visit Diagnoses       Encounter for genetic screening        Relevant Orders    MATERNAL SCREEN 4            Dispo: RTC in 4 weeks  Christine Ochoa MD

## 2023-02-27 NOTE — ASSESSMENT & PLAN NOTE
- FWB: reassuring by roman today  - Genetic screening: MQS next visit  - Anatomy scan: plan at 20 weeks  - Flu shot: discuss next visit  - Tdap: 28 weeks  - PNL: O+/ab-, RI, HepB/C neg, RPRnr, HIV neg, VZV immune, Hgb 13.9, UDS neg, GCCT/trich neg, Ucx no growth

## 2023-03-01 LAB
AFP INTERPRETATION: ABNORMAL
AFP MOM: 4.07
AFP SPECIMEN: ABNORMAL
D-INHIBIN: 263 PG/ML
DATING: ABNORMAL
EER MATERNAL SCREEN AFP, HCG, EST, INH: ABNORMAL
ESTIMATED DUE DATE: ABNORMAL
FETUS COUNT: ABNORMAL
GESTATIONAL AGE CALC AT COLLECT: ABNORMAL
HISTORY OF ANEUPLOIDY?: NO
HISTORY/NEURAL TUBE DEFECTS: NO
INSULIN DEP. DIABETIC: NO
MATERNAL AGE AT EDD: 31.2 YR
MATERNAL WEIGHT: ABNORMAL
MOM FOR HCG, 2ND TRIMESTER: 1.96
MOM FOR UE3: 0.72
MOM INHIBN: 1.75
PATIENT'S HCG, 2ND TRIMESTER: ABNORMAL IU/L
PT AFP: 107 NG/ML
PT UE3: 0.52 NG/ML
RACE: ABNORMAL
SMOKING: NO

## 2023-03-02 ENCOUNTER — TELEPHONE (OUTPATIENT)
Dept: OBGYN CLINIC | Age: 33
End: 2023-03-02

## 2023-03-02 NOTE — TELEPHONE ENCOUNTER
Faxed Referral for MFM with Demographics, labs,clinicals and insurance information. Marcial Clancy aware of need for genetic counseling as well.

## 2023-03-10 ENCOUNTER — APPOINTMENT (OUTPATIENT)
Dept: LABOR AND DELIVERY | Age: 33
DRG: 770 | End: 2023-03-10
Payer: COMMERCIAL

## 2023-03-10 ENCOUNTER — TELEPHONE (OUTPATIENT)
Dept: OBGYN CLINIC | Age: 33
End: 2023-03-10

## 2023-03-10 ENCOUNTER — HOSPITAL ENCOUNTER (INPATIENT)
Age: 33
LOS: 2 days | Discharge: HOME | DRG: 770 | End: 2023-03-12
Attending: OBSTETRICS & GYNECOLOGY | Admitting: OBSTETRICS & GYNECOLOGY
Payer: COMMERCIAL

## 2023-03-10 ENCOUNTER — APPOINTMENT (OUTPATIENT)
Dept: ULTRASOUND IMAGING | Age: 33
DRG: 770 | End: 2023-03-10
Payer: COMMERCIAL

## 2023-03-10 PROBLEM — O02.1 FETAL DEMISE BEFORE 20 WEEKS WITH RETENTION OF DEAD FETUS: Status: ACTIVE | Noted: 2023-03-10

## 2023-03-10 LAB
BASOPHILS ABSOLUTE: 0.1 K/UL (ref 0–0.2)
BASOPHILS RELATIVE PERCENT: 0.6 %
EOSINOPHILS ABSOLUTE: 0.1 K/UL (ref 0–0.6)
EOSINOPHILS RELATIVE PERCENT: 0.9 %
HCT VFR BLD CALC: 36.9 % (ref 36–48)
HEMOGLOBIN: 12.7 G/DL (ref 12–16)
LYMPHOCYTES ABSOLUTE: 2.4 K/UL (ref 1–5.1)
LYMPHOCYTES RELATIVE PERCENT: 22.8 %
MCH RBC QN AUTO: 31.8 PG (ref 26–34)
MCHC RBC AUTO-ENTMCNC: 34.5 G/DL (ref 31–36)
MCV RBC AUTO: 92.1 FL (ref 80–100)
MONOCYTES ABSOLUTE: 0.8 K/UL (ref 0–1.3)
MONOCYTES RELATIVE PERCENT: 8 %
NEUTROPHILS ABSOLUTE: 7 K/UL (ref 1.7–7.7)
NEUTROPHILS RELATIVE PERCENT: 67.7 %
PDW BLD-RTO: 13 % (ref 12.4–15.4)
PLATELET # BLD: 170 K/UL (ref 135–450)
PMV BLD AUTO: 9 FL (ref 5–10.5)
RBC # BLD: 4.01 M/UL (ref 4–5.2)
WBC # BLD: 10.3 K/UL (ref 4–11)

## 2023-03-10 PROCEDURE — 2580000003 HC RX 258: Performed by: OBSTETRICS & GYNECOLOGY

## 2023-03-10 PROCEDURE — 85730 THROMBOPLASTIN TIME PARTIAL: CPT

## 2023-03-10 PROCEDURE — 99222 1ST HOSP IP/OBS MODERATE 55: CPT | Performed by: OBSTETRICS & GYNECOLOGY

## 2023-03-10 PROCEDURE — 85025 COMPLETE CBC W/AUTO DIFF WBC: CPT

## 2023-03-10 PROCEDURE — 86901 BLOOD TYPING SEROLOGIC RH(D): CPT

## 2023-03-10 PROCEDURE — 76815 OB US LIMITED FETUS(S): CPT

## 2023-03-10 PROCEDURE — 86146 BETA-2 GLYCOPROTEIN ANTIBODY: CPT

## 2023-03-10 PROCEDURE — 86900 BLOOD TYPING SEROLOGIC ABO: CPT

## 2023-03-10 PROCEDURE — 85610 PROTHROMBIN TIME: CPT

## 2023-03-10 PROCEDURE — 1220000000 HC SEMI PRIVATE OB R&B

## 2023-03-10 PROCEDURE — 86147 CARDIOLIPIN ANTIBODY EA IG: CPT

## 2023-03-10 PROCEDURE — 85613 RUSSELL VIPER VENOM DILUTED: CPT

## 2023-03-10 PROCEDURE — 86850 RBC ANTIBODY SCREEN: CPT

## 2023-03-10 PROCEDURE — 88305 TISSUE EXAM BY PATHOLOGIST: CPT

## 2023-03-10 PROCEDURE — 6370000000 HC RX 637 (ALT 250 FOR IP): Performed by: OBSTETRICS & GYNECOLOGY

## 2023-03-10 RX ORDER — ONDANSETRON 2 MG/ML
4 INJECTION INTRAMUSCULAR; INTRAVENOUS EVERY 6 HOURS PRN
Status: DISCONTINUED | OUTPATIENT
Start: 2023-03-10 | End: 2023-03-11

## 2023-03-10 RX ORDER — DIPHENHYDRAMINE HCL 25 MG
25 TABLET ORAL NIGHTLY PRN
Status: DISCONTINUED | OUTPATIENT
Start: 2023-03-10 | End: 2023-03-11

## 2023-03-10 RX ORDER — SODIUM CHLORIDE, SODIUM LACTATE, POTASSIUM CHLORIDE, CALCIUM CHLORIDE 600; 310; 30; 20 MG/100ML; MG/100ML; MG/100ML; MG/100ML
INJECTION, SOLUTION INTRAVENOUS CONTINUOUS
Status: DISCONTINUED | OUTPATIENT
Start: 2023-03-10 | End: 2023-03-11

## 2023-03-10 RX ORDER — SODIUM CHLORIDE 0.9 % (FLUSH) 0.9 %
5-40 SYRINGE (ML) INJECTION PRN
Status: DISCONTINUED | OUTPATIENT
Start: 2023-03-10 | End: 2023-03-11

## 2023-03-10 RX ORDER — BUTORPHANOL TARTRATE 1 MG/ML
1 INJECTION, SOLUTION INTRAMUSCULAR; INTRAVENOUS
Status: DISCONTINUED | OUTPATIENT
Start: 2023-03-10 | End: 2023-03-11

## 2023-03-10 RX ORDER — SODIUM CHLORIDE 0.9 % (FLUSH) 0.9 %
5-40 SYRINGE (ML) INJECTION EVERY 12 HOURS SCHEDULED
Status: DISCONTINUED | OUTPATIENT
Start: 2023-03-10 | End: 2023-03-11

## 2023-03-10 RX ORDER — SODIUM CHLORIDE 9 MG/ML
25 INJECTION, SOLUTION INTRAVENOUS PRN
Status: DISCONTINUED | OUTPATIENT
Start: 2023-03-10 | End: 2023-03-11

## 2023-03-10 RX ORDER — MISOPROSTOL 100 UG/1
400 TABLET ORAL EVERY 4 HOURS
Status: DISCONTINUED | OUTPATIENT
Start: 2023-03-10 | End: 2023-03-11 | Stop reason: ALTCHOICE

## 2023-03-10 RX ADMIN — SODIUM CHLORIDE, POTASSIUM CHLORIDE, SODIUM LACTATE AND CALCIUM CHLORIDE: 600; 310; 30; 20 INJECTION, SOLUTION INTRAVENOUS at 21:48

## 2023-03-10 RX ADMIN — MISOPROSTOL 400 MCG: 100 TABLET ORAL at 21:19

## 2023-03-10 NOTE — TELEPHONE ENCOUNTER
Patient called to speak with Dr. Marietta Hopkins after her [facility] visit today. Routing to Dr. Marietta Hopkins.

## 2023-03-10 NOTE — TELEPHONE ENCOUNTER
Spoke with patient this afternoon regarding MFM visit. Diagnosed with IUFD measuring 15+1 days. We reviewed options for management including expectant, medical or surgical. Given later gestational age, if she opts for surgical management I would recommend procedure be done at [facility] and will help facilitate this through Dr. Oziel Ku. If she desires medical management we will schedule here at [facility]. Patient would like to discuss options with her  and will let us know what she decides to do.

## 2023-03-10 NOTE — TELEPHONE ENCOUNTER
Patient returned call and states she would like to move forward with induction/medical management. Spoke with charge nurse on floor, will plan to have her come in [facility] at 1900. Patient aware, all questions answered.

## 2023-03-11 ENCOUNTER — ANESTHESIA EVENT (OUTPATIENT)
Dept: LABOR AND DELIVERY | Age: 33
DRG: 770 | End: 2023-03-11
Payer: COMMERCIAL

## 2023-03-11 ENCOUNTER — ANESTHESIA (OUTPATIENT)
Dept: LABOR AND DELIVERY | Age: 33
DRG: 770 | End: 2023-03-11
Payer: COMMERCIAL

## 2023-03-11 LAB
ABO/RH: NORMAL
ANTIBODY SCREEN: NORMAL

## 2023-03-11 PROCEDURE — 3600000002 HC SURGERY LEVEL 2 BASE: Performed by: OBSTETRICS & GYNECOLOGY

## 2023-03-11 PROCEDURE — 6360000002 HC RX W HCPCS: Performed by: OBSTETRICS & GYNECOLOGY

## 2023-03-11 PROCEDURE — 7200000001 HC VAGINAL DELIVERY

## 2023-03-11 PROCEDURE — 2580000003 HC RX 258: Performed by: NURSE ANESTHETIST, CERTIFIED REGISTERED

## 2023-03-11 PROCEDURE — 2580000003 HC RX 258: Performed by: OBSTETRICS & GYNECOLOGY

## 2023-03-11 PROCEDURE — 6370000000 HC RX 637 (ALT 250 FOR IP): Performed by: OBSTETRICS & GYNECOLOGY

## 2023-03-11 PROCEDURE — 59160 D & C AFTER DELIVERY: CPT | Performed by: OBSTETRICS & GYNECOLOGY

## 2023-03-11 PROCEDURE — 6360000002 HC RX W HCPCS: Performed by: NURSE ANESTHETIST, CERTIFIED REGISTERED

## 2023-03-11 PROCEDURE — 7100000000 HC PACU RECOVERY - FIRST 15 MIN: Performed by: OBSTETRICS & GYNECOLOGY

## 2023-03-11 PROCEDURE — 2709999900 HC NON-CHARGEABLE SUPPLY: Performed by: OBSTETRICS & GYNECOLOGY

## 2023-03-11 PROCEDURE — 10D17ZZ EXTRACTION OF PRODUCTS OF CONCEPTION, RETAINED, VIA NATURAL OR ARTIFICIAL OPENING: ICD-10-PCS | Performed by: OBSTETRICS & GYNECOLOGY

## 2023-03-11 PROCEDURE — 6360000002 HC RX W HCPCS

## 2023-03-11 PROCEDURE — 1220000000 HC SEMI PRIVATE OB R&B

## 2023-03-11 PROCEDURE — 99232 SBSQ HOSP IP/OBS MODERATE 35: CPT | Performed by: OBSTETRICS & GYNECOLOGY

## 2023-03-11 PROCEDURE — 2500000003 HC RX 250 WO HCPCS: Performed by: NURSE ANESTHETIST, CERTIFIED REGISTERED

## 2023-03-11 PROCEDURE — 51701 INSERT BLADDER CATHETER: CPT

## 2023-03-11 PROCEDURE — 7100000001 HC PACU RECOVERY - ADDTL 15 MIN: Performed by: OBSTETRICS & GYNECOLOGY

## 2023-03-11 PROCEDURE — 3700000001 HC ADD 15 MINUTES (ANESTHESIA): Performed by: OBSTETRICS & GYNECOLOGY

## 2023-03-11 PROCEDURE — 3700000000 HC ANESTHESIA ATTENDED CARE: Performed by: OBSTETRICS & GYNECOLOGY

## 2023-03-11 PROCEDURE — 3600000012 HC SURGERY LEVEL 2 ADDTL 15MIN: Performed by: OBSTETRICS & GYNECOLOGY

## 2023-03-11 PROCEDURE — 2500000003 HC RX 250 WO HCPCS: Performed by: ANESTHESIOLOGY

## 2023-03-11 RX ORDER — KETOROLAC TROMETHAMINE 30 MG/ML
INJECTION, SOLUTION INTRAMUSCULAR; INTRAVENOUS PRN
Status: DISCONTINUED | OUTPATIENT
Start: 2023-03-11 | End: 2023-03-11 | Stop reason: SDUPTHER

## 2023-03-11 RX ORDER — PROPOFOL 10 MG/ML
INJECTION, EMULSION INTRAVENOUS PRN
Status: DISCONTINUED | OUTPATIENT
Start: 2023-03-11 | End: 2023-03-11 | Stop reason: SDUPTHER

## 2023-03-11 RX ORDER — SODIUM CHLORIDE 0.9 % (FLUSH) 0.9 %
5-40 SYRINGE (ML) INJECTION PRN
Status: DISCONTINUED | OUTPATIENT
Start: 2023-03-11 | End: 2023-03-12 | Stop reason: HOSPADM

## 2023-03-11 RX ORDER — FERROUS SULFATE 325(65) MG
325 TABLET ORAL 2 TIMES DAILY WITH MEALS
Status: DISCONTINUED | OUTPATIENT
Start: 2023-03-11 | End: 2023-03-12 | Stop reason: HOSPADM

## 2023-03-11 RX ORDER — OXYTOCIN 10 [USP'U]/ML
INJECTION, SOLUTION INTRAMUSCULAR; INTRAVENOUS PRN
Status: DISCONTINUED | OUTPATIENT
Start: 2023-03-11 | End: 2023-03-11 | Stop reason: SDUPTHER

## 2023-03-11 RX ORDER — MIDAZOLAM HYDROCHLORIDE 1 MG/ML
INJECTION INTRAMUSCULAR; INTRAVENOUS PRN
Status: DISCONTINUED | OUTPATIENT
Start: 2023-03-11 | End: 2023-03-11 | Stop reason: SDUPTHER

## 2023-03-11 RX ORDER — SODIUM CHLORIDE 9 MG/ML
INJECTION, SOLUTION INTRAVENOUS PRN
Status: DISCONTINUED | OUTPATIENT
Start: 2023-03-11 | End: 2023-03-12 | Stop reason: HOSPADM

## 2023-03-11 RX ORDER — SODIUM CHLORIDE, SODIUM LACTATE, POTASSIUM CHLORIDE, CALCIUM CHLORIDE 600; 310; 30; 20 MG/100ML; MG/100ML; MG/100ML; MG/100ML
INJECTION, SOLUTION INTRAVENOUS CONTINUOUS
Status: DISCONTINUED | OUTPATIENT
Start: 2023-03-11 | End: 2023-03-12 | Stop reason: HOSPADM

## 2023-03-11 RX ORDER — IBUPROFEN 600 MG/1
600 TABLET ORAL EVERY 8 HOURS PRN
Status: DISCONTINUED | OUTPATIENT
Start: 2023-03-11 | End: 2023-03-12 | Stop reason: HOSPADM

## 2023-03-11 RX ORDER — DOCUSATE SODIUM 100 MG/1
100 CAPSULE, LIQUID FILLED ORAL 2 TIMES DAILY
Status: DISCONTINUED | OUTPATIENT
Start: 2023-03-11 | End: 2023-03-12 | Stop reason: HOSPADM

## 2023-03-11 RX ORDER — CEFAZOLIN SODIUM IN 0.9 % NACL 2 G/100 ML
2000 PLASTIC BAG, INJECTION (ML) INTRAVENOUS EVERY 8 HOURS
Status: COMPLETED | OUTPATIENT
Start: 2023-03-11 | End: 2023-03-12

## 2023-03-11 RX ORDER — SODIUM CHLORIDE 0.9 % (FLUSH) 0.9 %
5-40 SYRINGE (ML) INJECTION EVERY 12 HOURS SCHEDULED
Status: DISCONTINUED | OUTPATIENT
Start: 2023-03-11 | End: 2023-03-12 | Stop reason: HOSPADM

## 2023-03-11 RX ORDER — OXYCODONE HYDROCHLORIDE 5 MG/1
5 TABLET ORAL EVERY 6 HOURS PRN
Status: DISCONTINUED | OUTPATIENT
Start: 2023-03-11 | End: 2023-03-12 | Stop reason: HOSPADM

## 2023-03-11 RX ORDER — ONDANSETRON 2 MG/ML
4 INJECTION INTRAMUSCULAR; INTRAVENOUS EVERY 6 HOURS PRN
Status: DISCONTINUED | OUTPATIENT
Start: 2023-03-11 | End: 2023-03-12 | Stop reason: HOSPADM

## 2023-03-11 RX ORDER — GLYCOPYRROLATE 0.2 MG/ML
INJECTION INTRAMUSCULAR; INTRAVENOUS PRN
Status: DISCONTINUED | OUTPATIENT
Start: 2023-03-11 | End: 2023-03-11 | Stop reason: SDUPTHER

## 2023-03-11 RX ORDER — ACETAMINOPHEN 500 MG
1000 TABLET ORAL EVERY 8 HOURS PRN
Status: DISCONTINUED | OUTPATIENT
Start: 2023-03-11 | End: 2023-03-12 | Stop reason: HOSPADM

## 2023-03-11 RX ORDER — METHYLERGONOVINE MALEATE 0.2 MG/1
200 TABLET ORAL EVERY 6 HOURS SCHEDULED
Status: DISCONTINUED | OUTPATIENT
Start: 2023-03-11 | End: 2023-03-12 | Stop reason: HOSPADM

## 2023-03-11 RX ORDER — FAMOTIDINE 10 MG/ML
20 INJECTION, SOLUTION INTRAVENOUS 2 TIMES DAILY
Status: DISCONTINUED | OUTPATIENT
Start: 2023-03-11 | End: 2023-03-12 | Stop reason: HOSPADM

## 2023-03-11 RX ORDER — CEFAZOLIN SODIUM IN 0.9 % NACL 2 G/100 ML
2000 PLASTIC BAG, INJECTION (ML) INTRAVENOUS EVERY 8 HOURS
Status: DISCONTINUED | OUTPATIENT
Start: 2023-03-11 | End: 2023-03-11

## 2023-03-11 RX ORDER — MISOPROSTOL 100 UG/1
400 TABLET ORAL ONCE
Status: COMPLETED | OUTPATIENT
Start: 2023-03-11 | End: 2023-03-11

## 2023-03-11 RX ORDER — FENTANYL CITRATE 50 UG/ML
INJECTION, SOLUTION INTRAMUSCULAR; INTRAVENOUS PRN
Status: DISCONTINUED | OUTPATIENT
Start: 2023-03-11 | End: 2023-03-11 | Stop reason: SDUPTHER

## 2023-03-11 RX ORDER — OXYCODONE HYDROCHLORIDE 5 MG/1
10 TABLET ORAL EVERY 6 HOURS PRN
Status: DISCONTINUED | OUTPATIENT
Start: 2023-03-11 | End: 2023-03-12 | Stop reason: HOSPADM

## 2023-03-11 RX ORDER — BUPIVACAINE HYDROCHLORIDE 7.5 MG/ML
INJECTION, SOLUTION INTRASPINAL
Status: COMPLETED | OUTPATIENT
Start: 2023-03-11 | End: 2023-03-11

## 2023-03-11 RX ORDER — SODIUM CHLORIDE, SODIUM LACTATE, POTASSIUM CHLORIDE, CALCIUM CHLORIDE 600; 310; 30; 20 MG/100ML; MG/100ML; MG/100ML; MG/100ML
INJECTION, SOLUTION INTRAVENOUS CONTINUOUS PRN
Status: DISCONTINUED | OUTPATIENT
Start: 2023-03-11 | End: 2023-03-11 | Stop reason: SDUPTHER

## 2023-03-11 RX ADMIN — MISOPROSTOL 400 MCG: 100 TABLET ORAL at 01:26

## 2023-03-11 RX ADMIN — HYDROMORPHONE HYDROCHLORIDE 0.5 MG: 0.5 INJECTION, SOLUTION INTRAMUSCULAR; INTRAVENOUS; SUBCUTANEOUS at 08:37

## 2023-03-11 RX ADMIN — SODIUM CHLORIDE, SODIUM LACTATE, POTASSIUM CHLORIDE, AND CALCIUM CHLORIDE: .6; .31; .03; .02 INJECTION, SOLUTION INTRAVENOUS at 13:50

## 2023-03-11 RX ADMIN — METHYLERGONOVINE 200 MCG: 0.2 TABLET ORAL at 16:15

## 2023-03-11 RX ADMIN — FENTANYL CITRATE 25 MCG: 50 INJECTION INTRAMUSCULAR; INTRAVENOUS at 13:59

## 2023-03-11 RX ADMIN — PROPOFOL 20 MG: 10 INJECTION, EMULSION INTRAVENOUS at 14:29

## 2023-03-11 RX ADMIN — PROPOFOL 20 MG: 10 INJECTION, EMULSION INTRAVENOUS at 14:21

## 2023-03-11 RX ADMIN — PROPOFOL 20 MG: 10 INJECTION, EMULSION INTRAVENOUS at 14:23

## 2023-03-11 RX ADMIN — PROPOFOL 20 MG: 10 INJECTION, EMULSION INTRAVENOUS at 14:19

## 2023-03-11 RX ADMIN — HYDROMORPHONE HYDROCHLORIDE 0.5 MG: 0.5 INJECTION, SOLUTION INTRAMUSCULAR; INTRAVENOUS; SUBCUTANEOUS at 11:08

## 2023-03-11 RX ADMIN — KETOROLAC TROMETHAMINE 30 MG: 30 INJECTION, SOLUTION INTRAMUSCULAR; INTRAVENOUS at 14:31

## 2023-03-11 RX ADMIN — OXYTOCIN 20 UNITS: 10 INJECTION INTRAVENOUS at 14:33

## 2023-03-11 RX ADMIN — MIDAZOLAM 2 MG: 1 INJECTION INTRAMUSCULAR; INTRAVENOUS at 13:52

## 2023-03-11 RX ADMIN — BUPIVACAINE HYDROCHLORIDE 7.5 MG: 7.5 INJECTION, SOLUTION SUBARACHNOID at 13:59

## 2023-03-11 RX ADMIN — Medication 87.3 MILLI-UNITS/MIN: at 15:15

## 2023-03-11 RX ADMIN — MISOPROSTOL 400 MCG: 100 TABLET ORAL at 11:53

## 2023-03-11 RX ADMIN — DOCUSATE SODIUM 100 MG: 100 CAPSULE, LIQUID FILLED ORAL at 20:00

## 2023-03-11 RX ADMIN — PROPOFOL 20 MG: 10 INJECTION, EMULSION INTRAVENOUS at 14:32

## 2023-03-11 RX ADMIN — Medication 10 ML: at 08:44

## 2023-03-11 RX ADMIN — MISOPROSTOL 400 MCG: 100 TABLET ORAL at 08:42

## 2023-03-11 RX ADMIN — PROPOFOL 30 MG: 10 INJECTION, EMULSION INTRAVENOUS at 14:14

## 2023-03-11 RX ADMIN — FENTANYL CITRATE 25 MCG: 50 INJECTION INTRAMUSCULAR; INTRAVENOUS at 14:12

## 2023-03-11 RX ADMIN — FENTANYL CITRATE 50 MCG: 50 INJECTION INTRAMUSCULAR; INTRAVENOUS at 13:52

## 2023-03-11 RX ADMIN — BUTORPHANOL TARTRATE 1 MG: 1 INJECTION, SOLUTION INTRAMUSCULAR; INTRAVENOUS at 03:35

## 2023-03-11 RX ADMIN — Medication 2000 MG: at 14:05

## 2023-03-11 RX ADMIN — PROPOFOL 20 MG: 10 INJECTION, EMULSION INTRAVENOUS at 14:16

## 2023-03-11 RX ADMIN — GLYCOPYRROLATE 0.2 MG: 0.2 INJECTION, SOLUTION INTRAMUSCULAR; INTRAVENOUS at 14:31

## 2023-03-11 RX ADMIN — HYDROMORPHONE HYDROCHLORIDE 1 MG: 1 INJECTION, SOLUTION INTRAMUSCULAR; INTRAVENOUS; SUBCUTANEOUS at 05:37

## 2023-03-11 RX ADMIN — IBUPROFEN 600 MG: 600 TABLET, FILM COATED ORAL at 20:00

## 2023-03-11 RX ADMIN — DIPHENHYDRAMINE HCL 25 MG: 25 TABLET ORAL at 05:25

## 2023-03-11 RX ADMIN — PROPOFOL 10 MG: 10 INJECTION, EMULSION INTRAVENOUS at 14:35

## 2023-03-11 RX ADMIN — ONDANSETRON 4 MG: 2 INJECTION INTRAMUSCULAR; INTRAVENOUS at 05:25

## 2023-03-11 RX ADMIN — SODIUM CHLORIDE, POTASSIUM CHLORIDE, SODIUM LACTATE AND CALCIUM CHLORIDE: 600; 310; 30; 20 INJECTION, SOLUTION INTRAVENOUS at 16:47

## 2023-03-11 RX ADMIN — SODIUM CHLORIDE, POTASSIUM CHLORIDE, SODIUM LACTATE AND CALCIUM CHLORIDE: 600; 310; 30; 20 INJECTION, SOLUTION INTRAVENOUS at 04:26

## 2023-03-11 RX ADMIN — Medication 2000 MG: at 22:16

## 2023-03-11 ASSESSMENT — PAIN DESCRIPTION - LOCATION
LOCATION: ABDOMEN

## 2023-03-11 ASSESSMENT — PAIN DESCRIPTION - ORIENTATION
ORIENTATION: LOWER
ORIENTATION: LOWER

## 2023-03-11 ASSESSMENT — PAIN SCALES - GENERAL
PAINLEVEL_OUTOF10: 0
PAINLEVEL_OUTOF10: 7
PAINLEVEL_OUTOF10: 7
PAINLEVEL_OUTOF10: 6

## 2023-03-11 ASSESSMENT — PAIN DESCRIPTION - DESCRIPTORS
DESCRIPTORS: CRAMPING;PRESSURE
DESCRIPTORS: CRAMPING;PRESSURE
DESCRIPTORS: CRAMPING

## 2023-03-11 ASSESSMENT — PAIN - FUNCTIONAL ASSESSMENT: PAIN_FUNCTIONAL_ASSESSMENT: ACTIVITIES ARE NOT PREVENTED

## 2023-03-11 NOTE — PROGRESS NOTES
Dr Ara Renteria called and informed of cervical exam. Also that cytotec was placed to posterior fornix of the vagina. Some remnants of her previous cytotec noted and also placed to posterior fornix of vagina. Homar Castillo RN

## 2023-03-11 NOTE — PROGRESS NOTES
Called to room for warm blanket and pain medication. Shakira Damon spoke with pt for clarification. Encouraged voiding prior to med. Warm blankets placed directly on body. Stadol given. Footies placed, Heat pack to abdomen. Water replenished. Lights dimmed.  and brother remain at the bedside. Ros Rahman RN

## 2023-03-11 NOTE — PROGRESS NOTES
SVE performed at 0740 and could not feel cervix but fetal parts felt. Paged Dr. Lauren Carbone and notified of exam, MD en route. Patient reports feeling a little crampy but states tolerable.

## 2023-03-11 NOTE — PLAN OF CARE
Problem: Pain  Goal: Verbalizes/displays adequate comfort level or baseline comfort level  3/10/2023 2033 by Eliel Tijerina RN  Outcome: Progressing  3/10/2023 2032 by Eliel Tijerina RN  Outcome: Progressing  3/10/2023 2032 by Eliel Tijerina RN  Outcome: Progressing

## 2023-03-11 NOTE — PROGRESS NOTES
First time get up with pt assisted by Elaine Chino RN and DOUG Martino RN. Pt was able to ambulate to the bathroom, yara care provided, clean gown, and underwear. Pt was NOT able to void. Pt able to ambulate back to bed with standby assist. Complete linen changed, comfort pad added to bed. Will continue to monitor.

## 2023-03-11 NOTE — PROGRESS NOTES
RN introduced self to pt and significant other. We went to the room and discussed plan of care. Pt requested an ultrasound for confirmation. She states she was sure MFM was correct, but would like another ultrasound. Pt very tearful at this time. RN requested pt change into hospital gown and obtain urine specimen.  RN informed pt and  that she would notify MD of Diogo Penn RN

## 2023-03-11 NOTE — ANESTHESIA PROCEDURE NOTES
Spinal Block    Patient location during procedure: OB  End time: 3/11/2023 1:59 PM  Reason for block: primary anesthetic  Staffing  Performed: resident/CRNA   Anesthesiologist: Abdoulaye Williamson MD  Resident/CRNA: AMINAH Lobato - CRNA  Spinal Block  Patient position: sitting  Prep: ChloraPrep  Patient monitoring: cardiac monitor, continuous pulse ox and frequent blood pressure checks  Approach: midline  Location: L3/L4  Guidance: paresthesia technique  Provider prep: mask and sterile gloves  Local infiltration: lidocaine  Needle  Needle type: Pencan   Needle gauge: 25 G  Needle length: 3.5 in  Assessment  Sensory level: T-12.  Swirl obtained: Yes  CSF: clear  Attempts: 1  Hemodynamics: stable  Additional Notes  Pt with sterile prep and drape. L3-4 localized with 1% lidocaine. Easy and atraumatic spinal. Pt sitting for 5 minutes after spinal for \"saddle block\" Toy well  Preanesthetic Checklist  Completed: patient identified, IV checked, site marked, risks and benefits discussed, equipment checked, pre-op evaluation, timeout performed, anesthesia consent given, oxygen available, monitors applied/VS acknowledged, fire risk safety assessment completed and verbalized and blood product R/B/A discussed and consented

## 2023-03-11 NOTE — OP NOTE
Department of Obstetrics and Gynecology  Obstetrical Operative Report    Pre-operative Diagnosis:    Tyrone Rashid is a [id number] [place] [address].o. Jane Greenk at [address]   Fetal demise with retained products of conception following fetal delivery    Post-operative Diagnosis:    Tyrone Rashid is a [id number] [place] [address].o. Jane Sark at [address]   Fetal demise with retained products of conception following fetal delivery    Procedure:  Dilation & Curretage    Surgeon:  LIGIA Trevizo DO      Assistant(s):  See operative record     Anesthesia:  Spinal anesthesia with sedation    Findings: Total IV fluids: See operative record    Urine Output:  400 ml    Estimated blood loss:  100ml    Blood Transfusion?:  No     Drains:  none    Specimens:    Placenta, products of conception and [facility]  Skin tissue sample taken from fetal back in genetics medium for cytogenetics    Complications:  none    Condition:    Mother stable to post-partum recovery room    Indications and Consent:   Tyrone Rashid is a [id number] [place] y.oTanner Marks at 16w6d s/p delivery of fetus following intrauterine fetal demise. Fetus was noted to have abdominal wall defect. Partial delivery of the placenta at bedside with approx 2cm of retained products within the lower uterine segment. Risks, benefits and alternatives were reviewed with the patient. Risks include, but are not limited to, infection, bleeding injury to the uterus and surrounding pelvic structures, risks of uterine adhesions and effects on future pregnancies as well as risks of anesthesia and even death. Patient wishes to proceed with dilation and curettage at this time. All questions were answered to the patient's satisfaction. Consents are signed and in chart. Procedure:   Patient was taken to the operating room and placed on the operating table. Spinal anesthesia was obtained without difficulty. Patient was placed in the lithotomy position using candy cane stirrups.   Patient was prepped and draped in the usual sterile fashion. Received 2g of Ancef prior to the start of the procedure. US was used transabdominally for guidance throughout the procedure. A weighted speculum was placed in the posterior aspect of the vagina. The anterior lip of the cervix was grasped using a ring forcep. A banjo curette was used to remove the placental tissue from the lower uterine segment. Gentle curettage was performed until no further products were noted. US measurement of the endometrial lining at this time was 0.67cm. Bleeding at this time was noted to be minimal.  Pitocin was started. All instruments were removed from the vagina. All sponge, lap and needle counts were noted to be correct. The patient tolerated the procedure well. The fetus was then brought in to the OR and 2cm tissue sample was taken from the fetal back. At this time fluid was extruded over the spine raising suspicion for possible neural tube defect as well. All specimens were handed off to be sent to pathology and cytogenetics. Patient was transferred to her postpartum recovery bed and taken to her postpartum room in stable condition.      Clemencia Ortega DO

## 2023-03-11 NOTE — PROGRESS NOTES
Patient seen and examined. Bleeding is noted to be minimal at this time. Bimanual exam was performed with cervix partially extruding through cervical os. Gentle sweeping around placenta was performed with no further descent of placenta and avulsion of umbilical cord noted at this time. VSS. Asymptomatic. With progress of placenta through cervix and stable bleeding will continue to monitor with cytotec at this time.

## 2023-03-11 NOTE — PROGRESS NOTES
Summary Note: RN called to RN d/t pt's discomfort. SVE performed 1 cm noted. Zofran and benadryl given. Dr Anna Reza notified of exam and discomfort. Orders received for dilaudid. This was given to pt with relief. Shari Talley RN

## 2023-03-11 NOTE — PROGRESS NOTES
Pt was resting quietly upon RN entering the room.  and her brother at the bedside. Pt states just cramping like when your \" period\" starts and pressure likes she need to go to the bathroom. She declines any interventions for pain control or to aid with sleep. 2nd dose of misoprostol placed after confirming no change in cervix. Linens provided for  and brother. Lights also dimmed at this time. Cynthia Butt RN'

## 2023-03-11 NOTE — H&P
Obstetrics Admission History and Physical    CC:   Chief Complaint   Patient presents with    Scheduled Induction     Fetal demise       HPI: Jordan Armas is a 29 y.o. Benjy Gurwinder at 16w5d who presents for induction of labor secondary to fetal demise. Patient seen at [facility] this AM for consultation for abnoraml quad screen (elevated risk of NTD) and US at that time consistent with fetal demise measuring 15+1. Patient opted for medical management and is here for induction. Denies VB/LOF, no cramps. No fevers/chills. Review of Systems: The following ROS was otherwise negative, except as noted in the HPI: constitutional, HEENT, respiratory, cardiovascular, gastrointestinal, genitourinary, skin, musculoskeletal, neurological, psych. OBGYN Provider : Opal Lozano    Obstetrical History:  OB History    Para Term  AB Living   1 0 0 0 0 0   SAB IAB Ectopic Molar Multiple Live Births   0 0 0 0 0 0      # Outcome Date GA Lbr Randolph/2nd Weight Sex Delivery Anes PTL Lv   1 Current                Gynecologic History:  Remote hx of abnl pap smears. Denies hx of STIs. Past Medical History:   Past Medical History:   Diagnosis Date    Atypical squamous cell changes of undetermined significance (ASCUS) on cervical cytology with positive high risk human papilloma virus (HPV) 10/26/2018    Ovarian cyst        Medications:  No current facility-administered medications on file prior to encounter. Current Outpatient Medications on File Prior to Encounter   Medication Sig Dispense Refill    Prenatal MV-Min-Fe Fum-FA-DHA (PRENATAL 1 PO) Take by mouth daily         Allergies:  Patient has no known allergies.     Surgical History:  Past Surgical History:   Procedure Laterality Date    COLONOSCOPY N/A 2019    LAPAROSCOPY N/A 2021    DIAGNOSTIC LAPAROSCOPY, RIGHT OVARIAN CYSTECTOMY, POSSIBLE OOPHERECTOMY performed by Sandrita Saavedra MD at [address]       Family History:  Family History   Problem Relation Age of Onset    Depression Mother     High Cholesterol Mother     Alcohol Abuse Father     Other Brother 32        narcolepsy    Colon Cancer Maternal Grandmother     Cancer Maternal Grandfather         pancreatic ( nov 2017)    Alcohol Abuse Paternal Grandmother     Alcohol Abuse Paternal Grandfather        Social History:  Social History     Substance and Sexual Activity   Alcohol Use Not Currently    Comment: socially: drink every other week     Social History     Substance and Sexual Activity   Drug Use No     Social History     Tobacco Use   Smoking Status Never   Smokeless Tobacco Never       Physical Exam:  /68   Pulse 75   Temp 98.3 °F (36.8 °C) (Oral)   Resp 18   Ht 5' 5\" (1.651 m)   Wt 183 lb 12.8 oz (83.4 kg)   LMP 11/13/2022 (Exact Date)   SpO2 100%   BMI 30.59 kg/m²   Physical Exam  HENT:      Head: Normocephalic. Cardiovascular:      Rate and Rhythm: Normal rate. Pulmonary:      Effort: Pulmonary effort is normal. No respiratory distress. Abdominal:      Palpations: Abdomen is soft. Tenderness: There is no abdominal tenderness. Skin:     General: Skin is warm and dry. Neurological:      Mental Status: She is alert. Psychiatric:         Mood and Affect: Mood normal.     Cervix: closed      Labs:  No visits with results within 1 Day(s) from this visit. Latest known visit with results is:   Routine Prenatal on 02/27/2023   Component Date Value    PT AFP 02/27/2023 107     AFP Mom 02/27/2023 4.07     PT uE3 02/27/2023 0.52     MoM for uE3 02/27/2023 0.72     Patient's HCG, 2nd Trime* 02/27/2023 87,306     Mom For HCG, 2nd Trimest* 02/27/2023 1.96     D-INHIBIN 02/27/2023 263     MoM INHIBN 02/27/2023 1.75     AFP Interp 02/27/2023 Screen Pos (A)     Maternal Age At XI 02/27/2023 31.2     Maternal Weight 02/27/2023 182.0 lbs.      Est Due Date 02/27/2023 08-20-23     Gestational Age Calc at * 02/27/2023 15 wks, 1 days     Dating 02/27/2023 Other     FETUS COUNT 02/27/2023 Az Mosley Race 2023 Nonblack     Insulin Dep. Diabetic 2023 No     Smoking 2023 No     HISTORY/NEURAL TUBE DEFE* 2023 No     History of Aneuploidy? 2023 No     AFP Specimen 2023 See Note     EER Maternal Screen AFP,* 2023 See Note      US OB 1 OR MORE FETUS LIMITED    Result Date: 3/10/2023  EXAMINATION: SECOND/THIRD TRIMESTER OBSTETRIC ULTRASOUND 3/10/2023 TECHNIQUE: Transabdominal second/third trimester obstetric pelvic ultrasound was performed. HISTORY: ORDERING SYSTEM PROVIDED HISTORY: Confirmation of demise TECHNOLOGIST PROVIDED HISTORY: Reason for exam:->Confirmation of demise FINDINGS: There is single intrauterine pregnancy in the cephalic presentation. No fetal cardiac activity can be documented. There is no fetal movement. The placenta is posteriorly located is normal in echogenicity the cervical length is 3.4 cm. There is no color flow or doppler cardiac signal seen. There is questionable diffuse mild body wall edema. Single intrauterine pregnancy in the breech presentation with no cardiac activity documented and questionable mild body wall edema in keeping with a fetal demise. Recommend OB gyn consultation. The findings were called to Dr. Radha Knox at 8:30 p.m. Assessment/Plan:   29 y.o.  at 16w5d (measuring 15+1 on US today) here for medical management of IUFD    IUFD   - confirmed per pt request on US on admission    - measuring 15+1 on US at [facility] this AM   - reviewed management options, discussed cytotec use/dosing/frequency.  Plan for 400mcg q4hr, first dose placed at 2120   - APLAS work-up sent, defer infectious testing at this time    - patient and partner desire genetic testing of fetus after delivery, will plan [facility]    Dispo: admit for medical management of 15 weeks fetal demise  Celeste Vizcarra MD

## 2023-03-11 NOTE — ANESTHESIA PRE PROCEDURE
Department of Anesthesiology  Preprocedure Note       Name:  Gracia Adams   Age:  29 y.o.  :  1990                                          MRN:  [id number]         Date:  3/11/2023      Surgeon: Marty Be):  Jeanette Muller DO    Procedure: Procedure(s):  DILATATION AND CURETTAGE    Medications prior to admission:   Prior to Admission medications    Medication Sig Start Date End Date Taking?  Authorizing Provider   Prenatal MV-Min-Fe Fum-FA-DHA (PRENATAL 1 PO) Take by mouth daily    Historical Provider, MD       Current medications:    Current Facility-Administered Medications   Medication Dose Route Frequency Provider Last Rate Last Admin    ceFAZolin (ANCEF) 2000 mg in 0.9% sodium chloride 100 mL IVPB  2,000 mg IntraVENous Q8H Jeanette Muller DO        lactated ringers IV soln infusion   IntraVENous Continuous Glo Wadsworth  mL/hr at 23 0426 New Bag at 23 0426    sodium chloride flush 0.9 % injection 5-40 mL  5-40 mL IntraVENous 2 times per day Glo Wadsworth MD   10 mL at 23 0844    sodium chloride flush 0.9 % injection 5-40 mL  5-40 mL IntraVENous PRN Glo Wadsworth MD        0.9 % sodium chloride infusion  25 mL IntraVENous PRN Glo Wadsworth MD        [facility]etron [facility]) injection 4 mg  4 mg IntraVENous Q6H PRN Glo Wadsworth MD   4 mg at 23 0525    butorphanol (STADOL) injection 1 mg  1 mg IntraVENous Q3H PRN Glo Wadsworth MD   1 mg at 23 0335    diphenhydrAMINE (BENADRYL) tablet 25 mg  25 mg Oral Nightly PRN Glo Wadsworth MD   25 mg at 23 2858       Allergies:  No Known Allergies    Problem List:    Patient Active Problem List   Diagnosis Code    Atypical squamous cell changes of undetermined significance (ASCUS) on cervical cytology with positive high risk human papilloma virus (HPV) R87.610, R87.810    Rectal bleeding K62.5    Ovarian mass, right N83.8    S/P ovarian cystectomy Z98.890, Z87.42    Prenatal care in second trimester Z34.92    Fetal demise before 20 weeks with retention of dead fetus O02.1       Past Medical History:        Diagnosis Date    Atypical squamous cell changes of undetermined significance (ASCUS) on cervical cytology with positive high risk human papilloma virus (HPV) 10/26/2018    Ovarian cyst        Past Surgical History:        Procedure Laterality Date    COLONOSCOPY N/A 7/8/2019    LAPAROSCOPY N/A 4/8/2021    DIAGNOSTIC LAPAROSCOPY, RIGHT OVARIAN CYSTECTOMY, POSSIBLE OOPHERECTOMY performed by Glo Wadsworth MD at [place] [address] History:    Social History     Tobacco Use    Smoking status: Never    Smokeless tobacco: Never   Substance Use Topics    Alcohol use: Not Currently     Comment: socially: drink every other week                                Counseling given: Not Answered      Vital Signs (Current):   Vitals:    03/11/23 1232 03/11/23 1247 03/11/23 1302 03/11/23 1317   BP: (!) 96/55 108/65 105/67 105/66   Pulse: 58 59 64 69   Resp:       Temp:       TempSrc:       SpO2:       Weight:       Height:                                                  BP Readings from Last 3 Encounters:   03/11/23 105/66   02/27/23 116/68   01/30/23 112/60       NPO Status:                                                                                 BMI:   Wt Readings from Last 3 Encounters:   03/10/23 183 lb 12.8 oz (83.4 kg)   02/27/23 182 lb (82.6 kg)   01/30/23 179 lb (81.2 kg)     Body mass index is 30.59 kg/m².     CBC:   Lab Results   Component Value Date/Time    WBC 10.3 03/10/2023 09:45 PM    RBC 4.01 03/10/2023 09:45 PM    HGB 12.7 03/10/2023 09:45 PM    HCT 36.9 03/10/2023 09:45 PM    MCV 92.1 03/10/2023 09:45 PM    RDW 13.0 03/10/2023 09:45 PM     03/10/2023 09:45 PM       CMP:   Lab Results   Component Value Date/Time     10/17/2019 12:13 PM    K 4.3 10/17/2019 12:13 PM     10/17/2019 12:13 PM    CO2 23 10/17/2019 12:13 PM    BUN 9 10/17/2019 12:13 PM    CREATININE 0.6 10/17/2019 12:13 PM    GFRAA >60 10/17/2019 12:13 PM    AGRATIO 2.5 10/17/2019 12:13 PM    LABGLOM >60 10/17/2019 12:13 PM    GLUCOSE 84 10/17/2019 12:13 PM    PROT 7.0 10/17/2019 12:13 PM    CALCIUM 9.8 10/17/2019 12:13 PM    BILITOT 1.3 10/17/2019 12:13 PM    ALKPHOS 59 10/17/2019 12:13 PM    AST 18 10/17/2019 12:13 PM    ALT 12 10/17/2019 12:13 PM       POC Tests: No results for input(s): POCGLU, POCNA, POCK, POCCL, POCBUN, POCHEMO, POCHCT in the last 72 hours. Coags: No results found for: PROTIME, INR, APTT    HCG (If Applicable):   Lab Results   Component Value Date    PREGTESTUR Negative 04/08/2021        ABGs: No results found for: PHART, PO2ART, ZDP4YMI, ETM3TPV, BEART, Q6MCBWMB     Type & Screen (If Applicable):  No results found for: LABABO, LABRH    Drug/Infectious Status (If Applicable):  No results found for: HIV, HEPCAB    COVID-19 Screening (If Applicable):   Lab Results   Component Value Date/Time    COVID19 Not Detected 04/02/2021 05:33 PM           Anesthesia Evaluation  Patient summary reviewed and Nursing notes reviewed no history of anesthetic complications:   Airway: Mallampati: II  TM distance: >3 FB   Neck ROM: full  Mouth opening: > = 3 FB   Dental: normal exam         Pulmonary:Negative Pulmonary ROS and normal exam              Patient did not smoke on day of surgery. Cardiovascular:Negative CV ROS  Exercise tolerance: good (>4 METS),                     Neuro/Psych:   Negative Neuro/Psych ROS              GI/Hepatic/Renal:            ROS comment: Obesity- BMI- 30.59. Endo/Other: Negative Endo/Other ROS                    Abdominal:   (+) obese,           Vascular: negative vascular ROS.          Other Findings:           Anesthesia Plan      spinal     ASA 2     (Discussed spinal procedure and risks including but not limited to infection, changes in VS, n/v, severe headache, paresthesia, intravascular injection, rare temporary or permanent injury and failed block with GETA back up. Discussed the addition of duramorph to the spinal.  Patient understands the side effects of duramorph and agrees to its utilization.)        Anesthetic plan and risks discussed with patient.    Use of blood products discussed with patient whom consented to blood products.   Plan discussed with CRNA.            Pt NPO solids >12 hours but has had clear liquids within one hour. Pt elects for spinal anesthesia.  Height: 5' 5\" (1.651 m), Weight: 183 lb 12.8 oz (83.4 kg), BP: 105/66, Pain 0-10: Pain Level: 7;     Lab Results   Component Value Date    WBC 10.3 03/10/2023    HGB 12.7 03/10/2023    HCT 36.9 03/10/2023    MCV 92.1 03/10/2023     03/10/2023           AMINAH Lobato - CRNA   3/11/2023

## 2023-03-11 NOTE — PROGRESS NOTES
RN and MD to bedside for exam. Pt requested to use the restroom before exam. Keaton Gonzalez placed in toilet. Pt called out stating she needed assistance. MD removed fetus from hat at 65. Pt stated she felt lightheaded, assisted back to bed with help x2. MD assessed for placenta and placed 400mg of cytotec NM at this time.

## 2023-03-11 NOTE — PROGRESS NOTES
Patient called out with increased pressure and questioned passage of placenta while voiding. .  Placenta still noted within the cervix. Risks, benefits and alternatives were reviewed with patient regarding bedside removal of placenta. Patient desires attempts at removal at bedside. Patient was given Dilaudid prior to attempts at removal.      Patient was placed in the low lithotomy position by lowering the foot of the bed. Bimanual exam was performed. The placental tissue was grasped using a ring forcep and using gentle traction on the placenta and maternal efforts and a large piece of placenta was removed. Bleeding was noted to be stable. Bedside US was performed and an approximately 1.8 x 2.1cm echogenic region was noted within the lower uterine segment. Ring forcep was passed several times with removal of blood clots and small amounts of placental debris. Bedside US was again performed with no change in size of retained products. EBL at this point was approximately 350ml with firm fundus and stable bleeding. Risks, benefits and alternatives were reviewed with the patient regarding continued management with bedside curettage, proceeding to the OR versus repeat dose of cytotec and an additional 30-60 minutes of monitoring. Patient desires to proceed with cytotec dose and monitoring. 400mcg of buccal cytotec given.

## 2023-03-11 NOTE — L&D DELIVERY NOTE
[facility] Obstetrics and Gynecology  Spontaneous Vaginal Delivery Note        Pre-operative Diagnosis:     Jazmyne Broussard is a 29 y.o. Darling Holm at 16w6d here with intrauterine demise  O positive  Positive AFP screening     Post-operative Diagnosis:    Jazmyne Broussard is a 29 y.o. Darling Holm at 16w6d here with intrauterine demise  O positive  Positive AFP screening             Anesthesia:  None      Admission and Delivery:       Patient presented to [facility]  and Delivery following [facility] consult for elevated AFP and was found to have fetal demise with infant measuring 15w1d on US with expected GA of 16w6d. Patient presents for cytotec induction. Patient is s/p 2 doses of cytotec and called out with increasing pain and pressure. Fetal parts were noted in the cervix. Patient got up to void and a non-viable fetus passed spontaneously in the hat on the toilet. Cord was clamped and cut and the fetus was removed and taken for examination. Fetal gender was noted to be male. Large abdominal wall defect was noted with extrusion of intestine and bowel. Difficult to examine due to fetal edema, however appearance of midline facial defect suspecting cleft lip/palate as well as some extension of the midline defect into the chest cavity with separation of rib tissues in the midline, no noted thoracic contents visible however. No membrane noted overlying the abdominal organs. Bleeding was noted to be minimal at this time and placental tissue was noted to be within the cervix, however not readily advancing despite maternal efforts to push. Additional 400 mcg of rectal cytotec was given and will await placental delivery due to stable bleeding. Perineum was inspected without significant laceration noted. The patient tolerated well and is in stable condition. EBL: <50 ml     Infant Wt: Pending     APGARS: 0      Specimen:  Patient and her  wish to proceed with cytogenetic testing. Will obtain skin tissue sampling as well as send fetus and placenta to [facility] for testing.       Condition:  mother stable     Attending Attestation: I performed the procedure.    Anahy Chávez DO

## 2023-03-11 NOTE — PROGRESS NOTES
In to check on patient following cytotec administration. Patient reports stable vaginal pressure with no increase in bleeding. Bedside US was performed and continued retained products measuring 1.8 x 1.6cm in the lower uterine segment. Risks, benefits and alternatives were reviewed. Patient desires to proceed with dilation and curettage in the operating room at this time. Consents are signed and in chart. Will proceed to OR.

## 2023-03-11 NOTE — PROGRESS NOTES
Dr Maddie Lau returned page to this RN. Informed her of pt request and need for other orders. She requested that I place [address] dn she would place the other orders. Ricky Bran RN

## 2023-03-11 NOTE — PROGRESS NOTES
RN at bedside and patient requesting to use restroom. Called out stating she thought her placenta came out.  Assisted back to bed and called MD

## 2023-03-12 VITALS
BODY MASS INDEX: 30.62 KG/M2 | WEIGHT: 183.8 LBS | OXYGEN SATURATION: 100 % | TEMPERATURE: 98.2 F | SYSTOLIC BLOOD PRESSURE: 99 MMHG | RESPIRATION RATE: 17 BRPM | HEART RATE: 66 BPM | HEIGHT: 65 IN | DIASTOLIC BLOOD PRESSURE: 58 MMHG

## 2023-03-12 LAB
BASOPHILS ABSOLUTE: 0 K/UL (ref 0–0.2)
BASOPHILS RELATIVE PERCENT: 0.4 %
EOSINOPHILS ABSOLUTE: 0.1 K/UL (ref 0–0.6)
EOSINOPHILS RELATIVE PERCENT: 1 %
HCT VFR BLD CALC: 30.5 % (ref 36–48)
HEMOGLOBIN: 10.7 G/DL (ref 12–16)
LYMPHOCYTES ABSOLUTE: 2.4 K/UL (ref 1–5.1)
LYMPHOCYTES RELATIVE PERCENT: 29.4 %
MCH RBC QN AUTO: 32.5 PG (ref 26–34)
MCHC RBC AUTO-ENTMCNC: 35 G/DL (ref 31–36)
MCV RBC AUTO: 92.7 FL (ref 80–100)
MONOCYTES ABSOLUTE: 0.5 K/UL (ref 0–1.3)
MONOCYTES RELATIVE PERCENT: 6.1 %
NEUTROPHILS ABSOLUTE: 5.1 K/UL (ref 1.7–7.7)
NEUTROPHILS RELATIVE PERCENT: 63.1 %
PDW BLD-RTO: 12.8 % (ref 12.4–15.4)
PLATELET # BLD: 133 K/UL (ref 135–450)
PMV BLD AUTO: 8.5 FL (ref 5–10.5)
RBC # BLD: 3.29 M/UL (ref 4–5.2)
WBC # BLD: 8.2 K/UL (ref 4–11)

## 2023-03-12 PROCEDURE — 85025 COMPLETE CBC W/AUTO DIFF WBC: CPT

## 2023-03-12 PROCEDURE — 6360000002 HC RX W HCPCS: Performed by: OBSTETRICS & GYNECOLOGY

## 2023-03-12 PROCEDURE — 6370000000 HC RX 637 (ALT 250 FOR IP): Performed by: OBSTETRICS & GYNECOLOGY

## 2023-03-12 PROCEDURE — 2580000003 HC RX 258: Performed by: OBSTETRICS & GYNECOLOGY

## 2023-03-12 PROCEDURE — 99024 POSTOP FOLLOW-UP VISIT: CPT | Performed by: OBSTETRICS & GYNECOLOGY

## 2023-03-12 PROCEDURE — 36415 COLL VENOUS BLD VENIPUNCTURE: CPT

## 2023-03-12 RX ORDER — IBUPROFEN 600 MG/1
600 TABLET ORAL EVERY 8 HOURS PRN
Qty: 60 TABLET | Refills: 1 | Status: SHIPPED | OUTPATIENT
Start: 2023-03-12

## 2023-03-12 RX ADMIN — IBUPROFEN 600 MG: 600 TABLET, FILM COATED ORAL at 05:47

## 2023-03-12 RX ADMIN — ACETAMINOPHEN 1000 MG: 500 TABLET ORAL at 00:05

## 2023-03-12 RX ADMIN — METHYLERGONOVINE 200 MCG: 0.2 TABLET ORAL at 05:47

## 2023-03-12 RX ADMIN — Medication 10 ML: at 05:48

## 2023-03-12 RX ADMIN — METHYLERGONOVINE 200 MCG: 0.2 TABLET ORAL at 00:05

## 2023-03-12 RX ADMIN — Medication 2000 MG: at 05:54

## 2023-03-12 ASSESSMENT — PAIN DESCRIPTION - LOCATION: LOCATION: ABDOMEN

## 2023-03-12 ASSESSMENT — PAIN SCALES - GENERAL
PAINLEVEL_OUTOF10: 0
PAINLEVEL_OUTOF10: 0

## 2023-03-12 ASSESSMENT — PAIN - FUNCTIONAL ASSESSMENT
PAIN_FUNCTIONAL_ASSESSMENT: ACTIVITIES ARE NOT PREVENTED
PAIN_FUNCTIONAL_ASSESSMENT: ACTIVITIES ARE NOT PREVENTED

## 2023-03-12 NOTE — PLAN OF CARE
Problem: Pain  Goal: Verbalizes/displays adequate comfort level or baseline comfort level  3/12/2023 0905 by Blanca Stack  Outcome: Completed  3/11/2023 2053 by Jeronimo Torrez RN  Outcome: Progressing

## 2023-03-12 NOTE — DISCHARGE INSTRUCTIONS
Follow up with your OB Provider Dr Bartolo Arreguin            At this time you may feel overwhelmed. Please know that there are resources available to help you through these difficult days. For a referral to a support group or to answer other questions you may call the Bereavement Nurse, Cheyanne Gomez [phone] or  the [facility] Care Department at [phone]. General Discharge Instructions      DIET  Eat a well balanced diet focusing on foods high in fiber and protein. Drink plenty of fluids especially water. To avoid constipation you may take a mild stool softener as recommended by your doctor or midwife. ACTIVITY  Gradually increase your activity. Resume exercise regimen only after advise by your doctor or midwife. Avoid lifting anything heavier than a gallon of milk for SIX weeks. Avoid driving for two weeks or if taking Narcotics. Rise slowly from a lying to sitting and then a standing position. Climb stairs one at a time. NO SEXUAL Activity for 4-6 weeks or until advised by your doctor; nothing in vagina: intercourse, tampons, or douching. You may feel tired or have a lack of energy. You may continue your prenatal vitamin to replenish nutrients post delivery. EMOTIONS  You may feel mcnair, sad & teary. Contact your OB provider if you feel you may be showing signs of postpartum depression. BLEEDING  Vaginal bleeding will decrease in amount over the next few weeks. You will notice that as your activity increases, your flow may increase. This is your body's way of telling you, you need take things easier and rest more often. Call your OB provider if you are saturating more than one maxi pad in an hour & resting does not help, if your bleeding has a foul odor or you are passing clots larger than a lemon.      BREAST CARE  Take medications as recommended by your doctor or midwife for pain  If you develop a warm, red, tender area on your breast or develop a fever contact your OB provider. You may apply ice packs to your breasts over you bra for twenty minutes at a time for comfort. Avoid stimulation to your breasts, when showering allow the water to strike your back not your breasts. Wear a good fitting bra until your milk dries, such as a sports bra. INCISIONAL CARE / EB CARE  Clean your incision in the shower with mild soap. After shower pat the incision are dry and allow the area open to air. If used, Steri-stipes should be removed by 2 weeks. If used, Audrey Genevieve should be removed by the home nurse or OB office by 1 week. If used/ordered, an abdominal binder may provide support for your incision. Use the eb-bottle after toileting until bleeding stops. Cleanse your perineum from front to back  If used, stitches will dissolve in 4-6 weeks. You may use a sitz bath or soak in a clean tub as needed for comfort. Kegel exercises will help restore bladder control. SWELLING  Try to keep your legs elevated when you are sitting. When lying down keep your legs elevated. When wearing stocking or socks, make sure they are not too tight. WHEN TO CALL THE DOCTOR  If you have a temp of 100.4 or more. If your bleeding has increased and you are saturating a pad in an hour. Your abdomen is tender to touch. You are passing blood clots bigger than the size of a lemon. If you are experiencing extreme weakness or dizziness. If you are having flu-like symptoms such as achy muscles or joints. There is a foul smell or a green color to your vaginal bleeding. If you have pain that cannot be relieved. You have persistent burning with urination or frequency. Call if you have concerns about your well-being. You are unable to sleep, eat, or are having thoughts of harming yourself. You have swelling, bleeding, drainage, fould odor, redness, or warmth in/around your incision or stitches. You have a red, warm, tender area in you calf.       I have received the Postpartum Discharge Instructions and have no further questions at this time. I understand the need for follow-up care with my [facility] Care Provider.

## 2023-03-12 NOTE — PROGRESS NOTES
Pt verbalized understanding of verbal and written discharge instructions and denies having questions at this time. Pt left OB unit at 0900 in wheelchair and in stable condition, accompanied by FOB. Prescriptions sent to pharmacy and patient aware it needs to be picked up.

## 2023-03-12 NOTE — DISCHARGE SUMMARY
[facility] Ob/Gyn  Obstetric Discharge Summary        Admitting Diagnosis:   Helen  is a 29 y.o. Norris Steen at 16w5d   IUFD  Elevated AFP  Blood type: O positive    Discharge Diagnosis:  Helen  is a 29 y.o.  s/p  and D&C at 16w6d   IUFD  Elevated AFP  Retained placental tissue  Blood type: O positive    Procedures:     D&C for retained placenta    Discharge Instructions:  Please call for increased pain not controlled by pain medications, significant vaginal bleeding greater than 1 pad/hour, temperature greater than 101 degrees F or any other concerns. Plan to follow up in 1-2 weeks. Diet:common adult    Activity:  Increase activity gradually. No heavy lifting or driving for 1 week. Pelvic rest for 6 weeks. No intercourse, tampons, douching, baths.        Medications:   - Motrin     Disposition: Home    Condition on discharge: Stable    Follow up: in 1-2 week(s)    Nestor Helling, DO

## 2023-03-12 NOTE — PROGRESS NOTES
[facility]/Gyn  Post Partum Progress Note    Subjective:   Jesus Garcia is a 29 y.o. [place] Cordial s/p  following fetal demise at 16w7d. Pregnancy was complicated by elevated AFP with subsequent fetal demise. Patient is doing well. Very tearful today due to grief, desires discharge home. Reports lochia is decreasing. Pain is well controlled. Tolerating regular diet without nausea or vomiting. Ambulating without difficulty, denies dizziness on standing. Voiding without difficulty. Denies headache, vision changes, RUQ pain. Denies chest pain, shortness of breath, fever, chills, calf pain. Objective:   Vitals:    23 0550   BP: (!) 99/58   Pulse: 66   Resp: 17   Temp: 98.2 °F (36.8 °C)   SpO2: 100%      GENERAL APPEARANCE: alert, well appearing, in no apparent distress  LUNGS: clear to auscultation, no wheezes, rales or rhonchi, symmetric air entry  HEART: regular rate and rhythm, no murmurs  ABDOMEN POSTPARTUM: Soft, non-tender, non-distended. No rebound or guarding. Fundus firm and non-tender below umbilicus.   EXTREMITIES: no redness or tenderness in the calves or thighs, no edema  SKIN: normal coloration and turgor, no rashes  NEUROLOGIC: alert, oriented, normal speech, no focal findings or movement disorder note    Impression: S/P Vaginal Delivery    Pertinent Labs:   Lab Results   Component Value Date    WBC 8.2 2023    HGB 10.7 (L) 2023    HCT 30.5 (L) 2023    MCV 92.7 2023     (L) 2023        Assessment / Plan:  Jesus Garcia is a 29 y.o. [place] Cordial s/p  with subsequent D&C for retained placenta at 16w6d     PPD #1     - Doing well     - Meeting milestones     - Continue routine care    IUFD      - Likely secondary to abdominal wall defect     - Cytogenetics pending     - Discussed grief and options for moods, anxiety and sleep     - Declines at this time     - Reviewed options for grief counseling and therapy - will call if desires S/p D&C for retained placenta     - s/p Additional antibiotics overnight     - Doing well without complaints     - No leukocytosis noted today      Elevated AFP    O pos    Disposition:   Stable for discharge home  Discharge instructions reviewed    Follow Up in the office in 1-2 weeks   Given Rx for Ibuprofen for pain     Marietta Millard DO

## 2023-03-12 NOTE — FLOWSHEET NOTE
Pt able to ambulate to bathroom, yara care provided. Pt ABLE to void 200mL. Pt back to bed, SCDs to legs, pulse ox on. Pt stable at this time.

## 2023-03-13 LAB
BETA-2 GLYCOPROTEIN 1 IGG ANTIBODY: <10 SGU
BETA-2 GLYCOPROTEIN 1 IGM ANTIBODY: <10 SMU

## 2023-03-14 LAB
ANTICARDIOLIPIN IGG ANTIBODY: <10 GPL
CARDIOLIPIN AB IGM: 14 MPL

## 2023-03-16 ENCOUNTER — POSTPARTUM VISIT (OUTPATIENT)
Dept: OBGYN CLINIC | Age: 33
End: 2023-03-16

## 2023-03-16 VITALS
HEART RATE: 78 BPM | BODY MASS INDEX: 29.75 KG/M2 | TEMPERATURE: 97.9 F | WEIGHT: 178.8 LBS | DIASTOLIC BLOOD PRESSURE: 64 MMHG | SYSTOLIC BLOOD PRESSURE: 116 MMHG

## 2023-03-16 DIAGNOSIS — O02.1 MISSED ABORTION WITH FETAL DEMISE BEFORE 20 COMPLETED WEEKS OF GESTATION: Primary | ICD-10-CM

## 2023-03-16 PROBLEM — Z34.92 PRENATAL CARE IN SECOND TRIMESTER: Status: RESOLVED | Noted: 2023-01-30 | Resolved: 2023-03-16

## 2023-03-16 PROBLEM — N83.8 OVARIAN MASS, RIGHT: Status: RESOLVED | Noted: 2021-01-08 | Resolved: 2023-03-16

## 2023-03-16 NOTE — PROGRESS NOTES
Post-op Office Visit    CC:   Chief Complaint   Patient presents with    Postpartum Care       HPI:  29 y.o. Lilian Millan who presents to office for follow-up visit. Patient diagnosed with IUFD at 14 weeks, had mIOL and delivered on 3/11/23 with subsequent D&C for retained placenta. Doing OK today, physically feels well. Denies significant pain or bleeding. Emotionally is coping, states she is grieving but does not feel like she isn't going about her daily activities. No SI/HI. MWQ 20.     Objective:  /64 (Site: Left Upper Arm, Position: Sitting, Cuff Size: Medium Adult)   Pulse 78   Temp 97.9 °F (36.6 °C) (Infrared)   Wt 178 lb 12.8 oz (81.1 kg)   LMP 11/13/2022 (Exact Date)   Breastfeeding No   BMI 29.75 kg/m²   Physical Exam  HENT:      Head: Normocephalic. Cardiovascular:      Rate and Rhythm: Normal rate. Pulmonary:      Effort: Pulmonary effort is normal. No respiratory distress. Abdominal:      Palpations: Abdomen is soft. Tenderness: There is no abdominal tenderness. Skin:     General: Skin is warm and dry. Neurological:      General: No focal deficit present. Mental Status: She is alert. Psychiatric:         Mood and Affect: Mood normal.     Labs:   No visits with results within 1 Day(s) from this visit. Latest known visit with results is:   Admission on 03/10/2023, Discharged on 03/12/2023   Component Date Value Ref Range Status    ABO/Rh 03/10/2023 O POS   Final    Antibody Screen 03/10/2023 NEG   Final    Beta-2 Glyco 1 IgG 03/10/2023 <10  <=20 SGU Final    Beta-2 Glyco 1 IgM 03/10/2023 <10  <=20 SMU Final    Comment: INTERPRETIVE INFORMATION: [id number] I, IgG and IgM Antibody  The persistent presence of IgG and/or IgM beta 2 glycoprotein I (B2GPI)  antibodies is a laboratory criterion for the diagnosis of  antiphospholipid  syndrome (APS).  Persistence is defined as moderate or high levels of  IgG  and/or IgM B2GPI antibodies detected in two or more specimens drawn at  least  12 weeks apart (J Throm Haemost. 2006;4:295-306). B2GPI results greater  than  20 SGU (IgG) and/or SMU (IgM) are considered positive based on the  cutoff  values established for this test. International reference materials and  consensus units for anti-B2GPI antibodies have not been established  (Clin  Viviane Acta. 2012;413(1-2):358-60; Arthritis Rheum. 2012;64(1):1-10.);  results  can be variable between different commercial immunoassays and cannot be  compared. Strong clinical correlation is recommended for a diagnosis of  APS. Low positive IgG and IgM B2GPI antibody levels should be interpreted in  light  of APS-specific                            clinical manifestations and/or other criteria  phospholipid  antibody tests. Performed By: Quoc Nolasco Jr[address], [address] [facility]  : Vasu Balderas MD, PhD      Anticardiolipin IgG 03/10/2023 <10  <=14 GPL Final    Comment: INTERPRETIVE INFORMATION: Anti-Cardiolipin IgG Ab  <=14 GPL: Negative  15-19 GPL: Indeterminate  20-80 GPL: Low to Moderately Positive  81 GPL or above: High Positive  The persistent presence of IgG and/or IgM cardiolipin (CL) antibodies  in  moderate or high levels (greater than 40 GPL and/or greater  than 40  MPL  units) is a laboratory criterion for the diagnosis of antiphospholipid  syndrome (APS). Persistence is defined as moderate or high levels of  IgG  and/or IgM CL antibodies detected in two or more specimens drawn at  least 12  weeks apart (J Throm Haemost. 2006;4:295-306). Lower positive levels of  IgG  and/or IgM CL antibodies (above cutoff but less than 40 GPL and/or less  than  40 MPL units) may occur in patients with the clinical symptoms of APS;  therefore, the actual significance of these levels is undefined.   Results  should not be used alone for diagnosis and must be interpreted in light  of  APS-specific clinical manifestations and/or other criteria phospholipid  an tibody tests. Cardiolipin Ab IgM 03/10/2023 14 (A)  <=12 MPL Final    Comment: INTERPRETIVE INFORMATION: Anti-Cardiolipin IgM  <=12 MPL: Negative  13-19 MPL: Indeterminate  20-80 MPL: Low to Moderately Positive  81 MPL or above: High Positive  The persistent presence of IgG and/or IgM cardiolipin (CL) antibodies  in  moderate or high levels (greater than 40 GPL and/or greater than 40 MPL  units) is a laboratory criterion for the diagnosis of antiphospholipid  syndrome (APS). Persistence is defined as moderate or high levels of  IgG  and/or IgM CL antibodies detected  in two or more specimens drawn at  least 12  weeks apart (J Throm Haemost. 2006;4:295-306). Lower positive levels of  IgG  and/or IgM CL antibodies (above cutoff but less than 40 GPL and/or less  than  40 MPL units) may occur in patients with the clinical symptoms of APS;  therefore, the actual significance of these levels is undefined. Results  should not be used alone for diagnosis and must be interpreted in light  of  APS-specific clinical manifestations and/or other criteria phospholipid  antibo                           dy tests.   Performed By: Quoc Nolasco Jr. [address], [address] [facility]  : Autumn Jacobo MD, PhD      WBC 03/10/2023 10.3  4.0 - 11.0 K/uL Final    RBC 03/10/2023 4.01  4.00 - 5.20 M/uL Final    Hemoglobin 03/10/2023 12.7  12.0 - 16.0 g/dL Final    Hematocrit 03/10/2023 36.9  36.0 - 48.0 % Final    MCV 03/10/2023 92.1  80.0 - 100.0 fL Final    MCH 03/10/2023 31.8  26.0 - 34.0 pg Final    MCHC 03/10/2023 34.5  31.0 - 36.0 g/dL Final    RDW 03/10/2023 13.0  12.4 - 15.4 % Final    Platelets 04/91/7811 170  135 - 450 K/uL Final    MPV 03/10/2023 9.0  5.0 - 10.5 fL Final    Neutrophils % 03/10/2023 67.7  % Final    Lymphocytes % 03/10/2023 22.8  % Final    Monocytes % 03/10/2023 8.0  % Final    Eosinophils % 03/10/2023 0.9  % Final    Basophils % 03/10/2023 0.6  % Final Neutrophils Absolute 03/10/2023 7.0  1.7 - 7.7 K/uL Final    Lymphocytes Absolute 03/10/2023 2.4  1.0 - 5.1 K/uL Final    Monocytes Absolute 03/10/2023 0.8  0.0 - 1.3 K/uL Final    Eosinophils Absolute 03/10/2023 0.1  0.0 - 0.6 K/uL Final    Basophils Absolute 03/10/2023 0.1  0.0 - 0.2 K/uL Final    WBC 2023 8.2  4.0 - 11.0 K/uL Final    RBC 2023 3.29 (A)  4.00 - 5.20 M/uL Final    Hemoglobin 2023 10.7 (A)  12.0 - 16.0 g/dL Final    Hematocrit 2023 30.5 (A)  36.0 - 48.0 % Final    MCV 2023 92.7  80.0 - 100.0 fL Final    MCH 2023 32.5  26.0 - 34.0 pg Final    MCHC 2023 35.0  31.0 - 36.0 g/dL Final    RDW 2023 12.8  12.4 - 15.4 % Final    Platelets  133 (A)  135 - 450 K/uL Final    MPV 2023 8.5  5.0 - 10.5 fL Final    Neutrophils % 2023 63.1  % Final    Lymphocytes % 2023 29.4  % Final    Monocytes % 2023 6.1  % Final    Eosinophils % 2023 1.0  % Final    Basophils % 2023 0.4  % Final    Neutrophils Absolute 2023 5.1  1.7 - 7.7 K/uL Final    Lymphocytes Absolute 2023 2.4  1.0 - 5.1 K/uL Final    Monocytes Absolute 2023 0.5  0.0 - 1.3 K/uL Final    Eosinophils Absolute 2023 0.1  0.0 - 0.6 K/uL Final    Basophils Absolute 2023 0.0  0.0 - 0.2 K/uL Final         Assessment/Plan   Diagnosis Orders   1. Missed  with fetal demise before 21 completed weeks of gestation        2.  Retained products of conception after delivery without hemorrhage          Patient with missed AB at 15 weeks, s/p delivery and D&C for retained placenta  - fetal midline defect noted (See pathology report), would recommend high dose folic acid with subsequent pregnancy  - initial APLAS with +ab, will repeat at 12 weeks  - consider MFM for preconception counseling prior to next pregnancy  - genetic testing still pending  - return precautions reviewed today, all questions answered    Dispo: 3 monhts for follow-up with labs, will call with results when completed  Chanel Lucero MD

## 2023-03-16 NOTE — ANESTHESIA POST-OP
Postoperative Anesthesia Note    Name:    Gio Baird  MRN:      [id number]    No data found. LABS:    CBC  Lab Results   Component Value Date/Time    WBC 8.2 03/12/2023 05:40 AM    HGB 10.7 (L) 03/12/2023 05:40 AM    HCT 30.5 (L) 03/12/2023 05:40 AM     (L) 03/12/2023 05:40 AM     RENAL  Lab Results   Component Value Date/Time     10/17/2019 12:13 PM    K 4.3 10/17/2019 12:13 PM     10/17/2019 12:13 PM    CO2 23 10/17/2019 12:13 PM    BUN 9 10/17/2019 12:13 PM    CREATININE 0.6 10/17/2019 12:13 PM    GLUCOSE 84 10/17/2019 12:13 PM     COAGS  No results found for: PROTIME, INR, APTT    Intake & Output:  No intake/output data recorded.      Nausea & Vomiting:  No    Level of Consciousness:  Awake    Pain Assessment:  Adequate analgesia    Anesthesia Complications:  No reported anesthetic complications from neuraxial anesthetic/opiod    SUMMARY      Vital signs stable

## 2023-03-16 NOTE — ANESTHESIA POSTPROCEDURE EVALUATION
Department of Anesthesiology  Postprocedure Note    Patient: Evita Gutierrez  MRN: [id number]  YOB: 1990  Date of evaluation: 3/15/2023      Procedure Summary     Date: 03/11/23 Room / Location: [facility]  / [facility]    Anesthesia Start: 7567 Anesthesia Stop: 9545    Procedure: DILATATION AND CURETTAGE (Uterus) Diagnosis:       Retained portions of placenta      (retained placenta)    Surgeons: Marietta Millard DO Responsible Provider: Marcela Martin MD    Anesthesia Type: spinal ASA Status: 2          Anesthesia Type: No value filed. Erik Phase I: Erik Score: 10    Erik Phase II:        Anesthesia Post Evaluation    Comments: Postoperative Anesthesia Note    Name:    Evita Gutierrez  MRN:      [id number]    Patient Vitals in the past 12 hrs:     LABS:    CBC  Lab Results       Component                Value               Date/Time                  WBC                      8.2                 03/12/2023 05:40 AM        HGB                      10.7 (L)            03/12/2023 05:40 AM        HCT                      30.5 (L)            03/12/2023 05:40 AM        PLT                      133 (L)             03/12/2023 05:40 AM   RENAL  Lab Results       Component                Value               Date/Time                  NA                       138                 10/17/2019 12:13 PM        K                        4.3                 10/17/2019 12:13 PM        CL                       101                 10/17/2019 12:13 PM        CO2                      23                  10/17/2019 12:13 PM        BUN                      9                   10/17/2019 12:13 PM        CREATININE               0.6                 10/17/2019 12:13 PM        GLUCOSE                  84                  10/17/2019 12:13 PM   COAGS  No results found for: PROTIME, INR, APTT    Intake & Output:  No intake/output data recorded.     Nausea & Vomiting:  No    Level of Consciousness:  Awake    Pain Assessment:  Adequate analgesia    Anesthesia Complications:  No apparent anesthetic complications    SUMMARY      Vital signs stable  OK to discharge from Stage I post anesthesia care.   Care transferred from Anesthesiology department on discharge from perioperative area

## 2023-03-17 LAB
APTT IMM NP PPP: ABNORMAL SEC (ref 32–48)
APTT P HEP NEUT PPP: ABNORMAL SEC (ref 32–48)
CONFIRM APTT STACLOT: ABNORMAL
DRVVT SCREEN TO CONFIRM RATIO: ABNORMAL RATIO
LA 3 SCREEN W REFLEX-IMP: ABNORMAL
LA NT DPL PPP QL: ABNORMAL
MIXING DRVVT: ABNORMAL SEC (ref 33–44)
PROTHROMBIN TIME: 12.9 SEC (ref 12–15.5)
REPTILASE TIME: ABNORMAL SEC
SCREEN APTT: 39 SEC (ref 32–48)
SCREEN DRVVT: 29 SEC (ref 33–44)
THROMBIN TIME: ABNORMAL SEC (ref 14.7–19.5)

## 2023-03-23 LAB
Lab: NORMAL
REPORT: NORMAL
THIS TEST SENT TO: NORMAL

## 2023-06-08 ENCOUNTER — OFFICE VISIT (OUTPATIENT)
Dept: OBGYN CLINIC | Age: 33
End: 2023-06-08
Payer: COMMERCIAL

## 2023-06-08 VITALS
BODY MASS INDEX: 29.89 KG/M2 | SYSTOLIC BLOOD PRESSURE: 110 MMHG | DIASTOLIC BLOOD PRESSURE: 64 MMHG | HEART RATE: 80 BPM | TEMPERATURE: 98.4 F | WEIGHT: 179.6 LBS

## 2023-06-08 DIAGNOSIS — O02.1 MISSED ABORTION WITH FETAL DEMISE BEFORE 20 COMPLETED WEEKS OF GESTATION: Primary | ICD-10-CM

## 2023-06-08 PROCEDURE — 1036F TOBACCO NON-USER: CPT | Performed by: OBSTETRICS & GYNECOLOGY

## 2023-06-08 PROCEDURE — 99212 OFFICE O/P EST SF 10 MIN: CPT | Performed by: OBSTETRICS & GYNECOLOGY

## 2023-06-08 PROCEDURE — G8417 CALC BMI ABV UP PARAM F/U: HCPCS | Performed by: OBSTETRICS & GYNECOLOGY

## 2023-06-08 PROCEDURE — G8427 DOCREV CUR MEDS BY ELIG CLIN: HCPCS | Performed by: OBSTETRICS & GYNECOLOGY

## 2023-06-08 RX ORDER — FOLIC ACID 1 MG/1
4 TABLET ORAL DAILY
Qty: 180 TABLET | Refills: 1 | Status: SHIPPED | OUTPATIENT
Start: 2023-06-08

## 2023-06-08 NOTE — PROGRESS NOTES
Return Gyn Office Visit    CC:   Chief Complaint   Patient presents with    Follow-up       HPI:  29 y.o. Gabbi Andrew who presents to office for follow-up. Patient diagnosed with IUFD at 14 weeks, had mIOL and delivered on 3/11/23 with subsequent D&C for retained placenta. Doing well today, things are going better now. Periods have been a little bit irregular since last visit, has had 2-3 periods but they are every 2-3 weeks. LMP 23. Ready to start trying again moving forward. Objective:  /64 (Site: Left Upper Arm, Position: Sitting, Cuff Size: Medium Adult)   Pulse 80   Temp 98.4 °F (36.9 °C) (Infrared)   Wt 179 lb 9.6 oz (81.5 kg)   LMP 2023 (Exact Date)   Breastfeeding No   BMI 29.89 kg/m²   Physical Exam  HENT:      Head: Normocephalic. Cardiovascular:      Rate and Rhythm: Normal rate. Pulmonary:      Effort: Pulmonary effort is normal. No respiratory distress. Skin:     General: Skin is warm and dry. Neurological:      General: No focal deficit present. Mental Status: She is alert. Psychiatric:         Mood and Affect: Mood normal.       Assessment/Plan  1. Missed  with fetal demise before 21 completed weeks of gestation  Patient with missed AB at 15 weeks, s/p delivery and D&C for retained placenta  - fetal midline defect noted (See pathology report), would recommend high dose folic acid with subsequent pregnancy -- discussed this today and rx sent to patient's pharmacy to be started when they start trying to conceive  - initial APLAS with indeterminant cardiolipin ab, will repeat today  - reviewed option for MFM preconception consult, if APLAS work-up negative will defer for now  - genetic testing negative    - CARDIOLIPIN ANTIBODIES IGG & IGM; Future  - BETA-2 GLYCOPROTEIN ANTIBODIES; Future  - LUPUS ANTICOAGULANT;  Future    Dispo: PRN or for annual exam  Chas Kitchen MD

## 2023-06-09 ENCOUNTER — HOSPITAL ENCOUNTER (OUTPATIENT)
Age: 33
Discharge: HOME | End: 2023-06-09
Payer: COMMERCIAL

## 2023-06-09 DIAGNOSIS — O02.1 MISSED ABORTION WITH FETAL DEMISE BEFORE 20 COMPLETED WEEKS OF GESTATION: ICD-10-CM

## 2023-06-09 PROCEDURE — 85730 THROMBOPLASTIN TIME PARTIAL: CPT

## 2023-06-09 PROCEDURE — 85613 RUSSELL VIPER VENOM DILUTED: CPT

## 2023-06-09 PROCEDURE — 86146 BETA-2 GLYCOPROTEIN ANTIBODY: CPT

## 2023-06-09 PROCEDURE — 85610 PROTHROMBIN TIME: CPT

## 2023-06-09 PROCEDURE — 86147 CARDIOLIPIN ANTIBODY EA IG: CPT

## 2023-06-11 LAB
B2 GLYCOPROT1 IGG SERPL IA-ACNC: <10 SGU
B2 GLYCOPROT1 IGM SERPL IA-ACNC: <10 SMU
CARDIOLIPIN IGG SER IA-ACNC: <10 GPL
CARDIOLIPIN IGM SER IA-ACNC: 16 MPL

## 2023-07-03 RX ORDER — FOLIC ACID 1 MG/1
4 TABLET ORAL DAILY
Qty: 120 TABLET | Refills: 2 | OUTPATIENT
Start: 2023-07-03

## 2023-09-25 RX ORDER — FOLIC ACID 1 MG/1
4 TABLET ORAL DAILY
Qty: 180 TABLET | Refills: 1 | Status: CANCELLED | OUTPATIENT
Start: 2023-09-25

## 2023-09-25 RX ORDER — FOLIC ACID 1 MG/1
4 TABLET ORAL DAILY
Qty: 180 TABLET | Refills: 1 | OUTPATIENT
Start: 2023-09-25

## 2023-09-25 NOTE — TELEPHONE ENCOUNTER
Unable to see order? Will place order but want to avoid duplicate unsigned order if already placed.   Thanks

## 2023-10-03 ENCOUNTER — PATIENT MESSAGE (OUTPATIENT)
Dept: OBGYN CLINIC | Age: 33
End: 2023-10-03

## 2023-10-03 NOTE — TELEPHONE ENCOUNTER
Congratulations. The flu shot and COVID vaccinations are recommended during pregnancy.  Would advice to take Tylenol if she were to spike a temperature greater than 100.4

## 2023-10-03 NOTE — TELEPHONE ENCOUNTER
From: Isaac Rule  To: Dr. Molli Sicard: 10/3/2023 11:04 AM EDT  Subject: Vaccines in early pregnancy     I am about 5 weeks pregnant and still need to get my flu and covid shots for this upcoming winter. Is it safe/ recommended to get them this early or should I wait until later? I have never had any bad reactions to previous covid/flu vaccines except for tiredness.

## 2023-10-25 ENCOUNTER — ROUTINE PRENATAL (OUTPATIENT)
Dept: OBGYN CLINIC | Age: 33
End: 2023-10-25
Payer: COMMERCIAL

## 2023-10-25 DIAGNOSIS — O20.9 BLEEDING IN EARLY PREGNANCY: ICD-10-CM

## 2023-10-25 DIAGNOSIS — O02.1 MISSED ABORTION: Primary | ICD-10-CM

## 2023-10-25 DIAGNOSIS — Z87.59 HISTORY OF MISCARRIAGE: ICD-10-CM

## 2023-10-25 DIAGNOSIS — N96 HISTORY OF RECURRENT MISCARRIAGES: ICD-10-CM

## 2023-10-25 PROCEDURE — G8417 CALC BMI ABV UP PARAM F/U: HCPCS | Performed by: OBSTETRICS & GYNECOLOGY

## 2023-10-25 PROCEDURE — G8428 CUR MEDS NOT DOCUMENT: HCPCS | Performed by: OBSTETRICS & GYNECOLOGY

## 2023-10-25 PROCEDURE — 1036F TOBACCO NON-USER: CPT | Performed by: OBSTETRICS & GYNECOLOGY

## 2023-10-25 PROCEDURE — 99213 OFFICE O/P EST LOW 20 MIN: CPT | Performed by: OBSTETRICS & GYNECOLOGY

## 2023-10-25 PROCEDURE — G8484 FLU IMMUNIZE NO ADMIN: HCPCS | Performed by: OBSTETRICS & GYNECOLOGY

## 2023-10-25 ASSESSMENT — ENCOUNTER SYMPTOMS
NAUSEA: 1
PHOTOPHOBIA: 0
VOMITING: 0
ABDOMINAL PAIN: 0
COLOR CHANGE: 0
SHORTNESS OF BREATH: 0

## 2023-10-25 NOTE — PROGRESS NOTES
Date and time of surgery :    10/26/23 at 56          Arrival Time:  830     Bring Picture ID and insurance card. Please wear simple, loose fitting clothing to the hospital.   Sasha Peers not bring valuables (money, credit cards, checkbooks, etc.)   Do not wear any makeup (including  eye makeup) and no nail polish or artificial nails on your fingers or toes. DO NOT wear any jewelry or piercings on day of surgery. All body piercing jewelry must be removed. If you have dentures, they will be removed before going to the OR; we will provide you a container. If you wear contact lenses or glasses, they will be removed; please bring a case for them. Shower the evening before or morning of surgery with antibacterial soap. Nothing to eat or drink after midnight the day before surgery. You may brush your teeth and gargle the morning of surgery. DO NOT SWALLOW WATER. Do not take any morning meds the day of your surgery. Aspirin, Ibuprofen, Advil, Naproxen, Vitamin E and other Anti-inflammatory products and supplements should be stopped for 5 -7days before surgery or as directed by your physician. Do not smoke or drink any alcoholic beverages 24 hours prior to surgery. This includes NA Beer. Refrain from the usage of any recreational drugs, including non-prescribed prescription drugs. You MUST plan for a responsible adult to stay on site while you are here and take you home after your surgery. You will not be allowed to leave alone or drive yourself home. It is strongly suggested someone stay with you the first 24 hrs. Your surgery will be cancelled if you do not have a ride home. To help prevent infection, change your sheets the night before surgery. If you  have a Living Will and Durable Power of  for Healthcare, please bring in a copy. Notify your Surgeon if you develop any illness between now and time of surgery.  Cough, cold, fever, sore throat, nausea, vomiting, etc.  Please notify your surgeon if

## 2023-10-25 NOTE — PROGRESS NOTES
OB/GYN History and Physical     HPI: Shivani Silva is a 34 y.o. female who presents for acute office visit due to bleeding in early pregnancy. Pt reports she just had a moderate size clot pass but no pain or further bleeding. Denies fevers / chills / abdominal pain / vaginal discharge otherwise   She has a OB hx significant for H/o fetal demise at 16 weeks with indeterminate APLAS work-up / pt saw MFM and APLAS negative, recommended additional folic acid in pregnancy. GYN hx significant for Right ovarian cystectomy, Teratoma found incidentally on US     Review of Systems   Constitutional:  Negative for activity change, appetite change and fatigue. Eyes:  Negative for photophobia and visual disturbance. Respiratory:  Negative for shortness of breath. Cardiovascular:  Negative for chest pain, palpitations and leg swelling. Gastrointestinal:  Positive for nausea. Negative for abdominal pain and vomiting. Genitourinary:  Positive for vaginal bleeding and vaginal discharge. Negative for difficulty urinating.        (+) absence of Menses   (+) breast tenderness    Skin:  Negative for color change. Neurological:  Negative for dizziness and numbness. Hematological:  Negative for adenopathy. Does not bruise/bleed easily. Psychiatric/Behavioral:  Negative for behavioral problems. The patient is not nervous/anxious.       OBGYN Provider : Vish Hong DO     Gynecologic History:   Denies history of abnormal pap smears  Denies history of STIs       Obstetrical History:  OB History          2    Para   0    Term   0       0    AB   1    Living   0         SAB   1    IAB   0    Ectopic   0    Molar   0    Multiple   0    Live Births   0                Past Medical History:   Past Medical History:   Diagnosis Date    Atypical squamous cell changes of undetermined significance (ASCUS) on cervical cytology with positive high risk human papilloma virus (HPV) 10/26/2018    Ovarian cyst

## 2023-10-25 NOTE — PROGRESS NOTES
Jama Darke    Age 34 y.o.    female    1990    MRN [id number]    10/26/2023  Arrival Time_____________  OR Time____________75 Min     Procedure(s):  SUCTION DILATATION AND CURETTAGE                      General    Surgeon(s): Janet Taylor, DO       Phone [phone] ([place])     [facility]  [facility]         Work  _____________________________________________________________________  _____________________________________________________________________  _____________________________________________________________________  _____________________________________________________________________  _____________________________________________________________________    PCP _____________________________ Phone_________________     H&P  ________________  Bringing      Chart              Epic      DOS      Called________  EKG ________________   Bringing      Chart              Epic      DOS      Called________  LABS________________   Bringing     Chart              Epic      DOS      Called________  Cardiac Clearance ______ Bringing      Chart              Epic      DOS      Called________  Pulmonary Clearance____ Bringing      Chart              Epic      DOS      Called________    Cardiologist________________________ Phone___________________________  Pulmonologist_______________________Phone___________________________    ? Advance Directives   ? Anabaptist concerns / Waiver on Chart            PAT Communications________________  ? Pre-op Instructions Given [address]          _________________________________  ? Directions to Surgery Center                          _________________________________  ? Transportation Home_______________      __________________________________  ?  Crutches/Walker__________________        __________________________________    ________Pre-op Orders   _______Transcribed    _______Wt.  ________Pharmacy          _______SCD       ______TED Luciano Figueroa

## 2023-10-26 ENCOUNTER — ANESTHESIA EVENT (OUTPATIENT)
Dept: OPERATING ROOM | Age: 33
End: 2023-10-26
Payer: COMMERCIAL

## 2023-10-26 ENCOUNTER — HOSPITAL ENCOUNTER (OUTPATIENT)
Age: 33
Setting detail: OUTPATIENT SURGERY
Discharge: HOME | End: 2023-10-26
Attending: OBSTETRICS & GYNECOLOGY | Admitting: OBSTETRICS & GYNECOLOGY
Payer: COMMERCIAL

## 2023-10-26 ENCOUNTER — ANESTHESIA (OUTPATIENT)
Dept: OPERATING ROOM | Age: 33
End: 2023-10-26
Payer: COMMERCIAL

## 2023-10-26 VITALS
RESPIRATION RATE: 13 BRPM | TEMPERATURE: 97.2 F | DIASTOLIC BLOOD PRESSURE: 53 MMHG | BODY MASS INDEX: 30.82 KG/M2 | WEIGHT: 185 LBS | HEART RATE: 56 BPM | SYSTOLIC BLOOD PRESSURE: 104 MMHG | OXYGEN SATURATION: 100 % | HEIGHT: 65 IN

## 2023-10-26 DIAGNOSIS — O02.1 MISSED ABORTION: ICD-10-CM

## 2023-10-26 DIAGNOSIS — O02.1 MISSED ABORTION WITH FETAL DEMISE BEFORE 20 COMPLETED WEEKS OF GESTATION: Primary | ICD-10-CM

## 2023-10-26 LAB
ABO + RH BLD: NORMAL
BLD GP AB SCN SERPL QL: NORMAL
DEPRECATED RDW RBC AUTO: 12.7 % (ref 12.4–15.4)
HCT VFR BLD AUTO: 39.9 % (ref 36–48)
HGB BLD-MCNC: 13.9 G/DL (ref 12–16)
MCH RBC QN AUTO: 32 PG (ref 26–34)
MCHC RBC AUTO-ENTMCNC: 34.8 G/DL (ref 31–36)
MCV RBC AUTO: 91.9 FL (ref 80–100)
PLATELET # BLD AUTO: 203 K/UL (ref 135–450)
PMV BLD AUTO: 7.9 FL (ref 5–10.5)
RBC # BLD AUTO: 4.35 M/UL (ref 4–5.2)
WBC # BLD AUTO: 8.3 K/UL (ref 4–11)

## 2023-10-26 PROCEDURE — 88305 TISSUE EXAM BY PATHOLOGIST: CPT

## 2023-10-26 PROCEDURE — 2580000003 HC RX 258: Performed by: ANESTHESIOLOGY

## 2023-10-26 PROCEDURE — 3600000003 HC SURGERY LEVEL 3 BASE: Performed by: OBSTETRICS & GYNECOLOGY

## 2023-10-26 PROCEDURE — 2709999900 HC NON-CHARGEABLE SUPPLY: Performed by: OBSTETRICS & GYNECOLOGY

## 2023-10-26 PROCEDURE — 6370000000 HC RX 637 (ALT 250 FOR IP): Performed by: OBSTETRICS & GYNECOLOGY

## 2023-10-26 PROCEDURE — 6370000000 HC RX 637 (ALT 250 FOR IP): Performed by: ANESTHESIOLOGY

## 2023-10-26 PROCEDURE — 85027 COMPLETE CBC AUTOMATED: CPT

## 2023-10-26 PROCEDURE — 7100000000 HC PACU RECOVERY - FIRST 15 MIN: Performed by: OBSTETRICS & GYNECOLOGY

## 2023-10-26 PROCEDURE — 7100000001 HC PACU RECOVERY - ADDTL 15 MIN: Performed by: OBSTETRICS & GYNECOLOGY

## 2023-10-26 PROCEDURE — 7100000010 HC PHASE II RECOVERY - FIRST 15 MIN: Performed by: OBSTETRICS & GYNECOLOGY

## 2023-10-26 PROCEDURE — 6360000002 HC RX W HCPCS: Performed by: ANESTHESIOLOGY

## 2023-10-26 PROCEDURE — 86901 BLOOD TYPING SEROLOGIC RH(D): CPT

## 2023-10-26 PROCEDURE — 2500000003 HC RX 250 WO HCPCS: Performed by: ANESTHESIOLOGY

## 2023-10-26 PROCEDURE — 2580000003 HC RX 258: Performed by: OBSTETRICS & GYNECOLOGY

## 2023-10-26 PROCEDURE — 86850 RBC ANTIBODY SCREEN: CPT

## 2023-10-26 PROCEDURE — A4217 STERILE WATER/SALINE, 500 ML: HCPCS | Performed by: OBSTETRICS & GYNECOLOGY

## 2023-10-26 PROCEDURE — 3700000001 HC ADD 15 MINUTES (ANESTHESIA): Performed by: OBSTETRICS & GYNECOLOGY

## 2023-10-26 PROCEDURE — 3700000000 HC ANESTHESIA ATTENDED CARE: Performed by: OBSTETRICS & GYNECOLOGY

## 2023-10-26 PROCEDURE — 3600000013 HC SURGERY LEVEL 3 ADDTL 15MIN: Performed by: OBSTETRICS & GYNECOLOGY

## 2023-10-26 PROCEDURE — 7100000011 HC PHASE II RECOVERY - ADDTL 15 MIN: Performed by: OBSTETRICS & GYNECOLOGY

## 2023-10-26 PROCEDURE — 86900 BLOOD TYPING SEROLOGIC ABO: CPT

## 2023-10-26 RX ORDER — LIDOCAINE HYDROCHLORIDE 20 MG/ML
INJECTION, SOLUTION EPIDURAL; INFILTRATION; INTRACAUDAL; PERINEURAL PRN
Status: DISCONTINUED | OUTPATIENT
Start: 2023-10-26 | End: 2023-10-26 | Stop reason: SDUPTHER

## 2023-10-26 RX ORDER — ONDANSETRON 2 MG/ML
4 INJECTION INTRAMUSCULAR; INTRAVENOUS
Status: COMPLETED | OUTPATIENT
Start: 2023-10-26 | End: 2023-10-26

## 2023-10-26 RX ORDER — IPRATROPIUM BROMIDE AND ALBUTEROL SULFATE 2.5; .5 MG/3ML; MG/3ML
1 SOLUTION RESPIRATORY (INHALATION)
Status: DISCONTINUED | OUTPATIENT
Start: 2023-10-26 | End: 2023-10-26 | Stop reason: HOSPADM

## 2023-10-26 RX ORDER — SODIUM CHLORIDE, SODIUM LACTATE, POTASSIUM CHLORIDE, CALCIUM CHLORIDE 600; 310; 30; 20 MG/100ML; MG/100ML; MG/100ML; MG/100ML
INJECTION, SOLUTION INTRAVENOUS CONTINUOUS PRN
Status: DISCONTINUED | OUTPATIENT
Start: 2023-10-26 | End: 2023-10-26 | Stop reason: SDUPTHER

## 2023-10-26 RX ORDER — SODIUM CHLORIDE 0.9 % (FLUSH) 0.9 %
5-40 SYRINGE (ML) INJECTION PRN
Status: DISCONTINUED | OUTPATIENT
Start: 2023-10-26 | End: 2023-10-26 | Stop reason: HOSPADM

## 2023-10-26 RX ORDER — DEXAMETHASONE SODIUM PHOSPHATE 10 MG/ML
INJECTION, SOLUTION INTRAMUSCULAR; INTRAVENOUS PRN
Status: DISCONTINUED | OUTPATIENT
Start: 2023-10-26 | End: 2023-10-26 | Stop reason: SDUPTHER

## 2023-10-26 RX ORDER — MAGNESIUM HYDROXIDE 1200 MG/15ML
LIQUID ORAL CONTINUOUS PRN
Status: COMPLETED | OUTPATIENT
Start: 2023-10-26 | End: 2023-10-26

## 2023-10-26 RX ORDER — FENTANYL CITRATE 50 UG/ML
INJECTION, SOLUTION INTRAMUSCULAR; INTRAVENOUS PRN
Status: DISCONTINUED | OUTPATIENT
Start: 2023-10-26 | End: 2023-10-26 | Stop reason: SDUPTHER

## 2023-10-26 RX ORDER — MIDAZOLAM HYDROCHLORIDE 1 MG/ML
INJECTION INTRAMUSCULAR; INTRAVENOUS PRN
Status: DISCONTINUED | OUTPATIENT
Start: 2023-10-26 | End: 2023-10-26 | Stop reason: SDUPTHER

## 2023-10-26 RX ORDER — SODIUM CHLORIDE 9 MG/ML
INJECTION, SOLUTION INTRAVENOUS PRN
Status: DISCONTINUED | OUTPATIENT
Start: 2023-10-26 | End: 2023-10-26 | Stop reason: HOSPADM

## 2023-10-26 RX ORDER — ONDANSETRON 2 MG/ML
INJECTION INTRAMUSCULAR; INTRAVENOUS PRN
Status: DISCONTINUED | OUTPATIENT
Start: 2023-10-26 | End: 2023-10-26 | Stop reason: SDUPTHER

## 2023-10-26 RX ORDER — OXYCODONE HYDROCHLORIDE 5 MG/1
10 TABLET ORAL PRN
Status: DISCONTINUED | OUTPATIENT
Start: 2023-10-26 | End: 2023-10-26 | Stop reason: HOSPADM

## 2023-10-26 RX ORDER — DOXYCYCLINE HYCLATE 100 MG
200 TABLET ORAL ONCE
Status: COMPLETED | OUTPATIENT
Start: 2023-10-26 | End: 2023-10-26

## 2023-10-26 RX ORDER — IBUPROFEN 800 MG/1
800 TABLET ORAL EVERY 8 HOURS PRN
Qty: 30 TABLET | Refills: 0 | Status: SHIPPED | OUTPATIENT
Start: 2023-10-26

## 2023-10-26 RX ORDER — KETOROLAC TROMETHAMINE 30 MG/ML
INJECTION, SOLUTION INTRAMUSCULAR; INTRAVENOUS PRN
Status: DISCONTINUED | OUTPATIENT
Start: 2023-10-26 | End: 2023-10-26 | Stop reason: SDUPTHER

## 2023-10-26 RX ORDER — OXYCODONE HYDROCHLORIDE 5 MG/1
5 TABLET ORAL
Status: DISCONTINUED | OUTPATIENT
Start: 2023-10-26 | End: 2023-10-26 | Stop reason: HOSPADM

## 2023-10-26 RX ORDER — HYDROCODONE BITARTRATE AND ACETAMINOPHEN 5; 325 MG/1; MG/1
1 TABLET ORAL EVERY 6 HOURS PRN
Qty: 10 TABLET | Refills: 0 | Status: SHIPPED | OUTPATIENT
Start: 2023-10-26 | End: 2023-10-29

## 2023-10-26 RX ORDER — PROCHLORPERAZINE EDISYLATE 5 MG/ML
5 INJECTION INTRAMUSCULAR; INTRAVENOUS
Status: DISCONTINUED | OUTPATIENT
Start: 2023-10-26 | End: 2023-10-26 | Stop reason: HOSPADM

## 2023-10-26 RX ORDER — MEPERIDINE HYDROCHLORIDE 50 MG/ML
12.5 INJECTION INTRAMUSCULAR; INTRAVENOUS; SUBCUTANEOUS EVERY 5 MIN PRN
Status: DISCONTINUED | OUTPATIENT
Start: 2023-10-26 | End: 2023-10-26 | Stop reason: HOSPADM

## 2023-10-26 RX ORDER — SODIUM CHLORIDE 0.9 % (FLUSH) 0.9 %
5-40 SYRINGE (ML) INJECTION EVERY 12 HOURS SCHEDULED
Status: DISCONTINUED | OUTPATIENT
Start: 2023-10-26 | End: 2023-10-26 | Stop reason: HOSPADM

## 2023-10-26 RX ORDER — PROPOFOL 10 MG/ML
INJECTION, EMULSION INTRAVENOUS PRN
Status: DISCONTINUED | OUTPATIENT
Start: 2023-10-26 | End: 2023-10-26 | Stop reason: SDUPTHER

## 2023-10-26 RX ORDER — CITRIC ACID/SODIUM CITRATE 334-500MG
30 SOLUTION, ORAL ORAL ONCE
Status: COMPLETED | OUTPATIENT
Start: 2023-10-26 | End: 2023-10-26

## 2023-10-26 RX ADMIN — DEXAMETHASONE SODIUM PHOSPHATE 5 MG: 10 INJECTION, SOLUTION INTRAMUSCULAR; INTRAVENOUS at 10:51

## 2023-10-26 RX ADMIN — FENTANYL CITRATE 50 MCG: 50 INJECTION, SOLUTION INTRAMUSCULAR; INTRAVENOUS at 10:51

## 2023-10-26 RX ADMIN — KETOROLAC TROMETHAMINE 15 MG: 30 INJECTION, SOLUTION INTRAMUSCULAR; INTRAVENOUS at 11:02

## 2023-10-26 RX ADMIN — ONDANSETRON 4 MG: 2 INJECTION INTRAMUSCULAR; INTRAVENOUS at 10:51

## 2023-10-26 RX ADMIN — SODIUM CHLORIDE, SODIUM LACTATE, POTASSIUM CHLORIDE, AND CALCIUM CHLORIDE: .6; .31; .03; .02 INJECTION, SOLUTION INTRAVENOUS at 10:40

## 2023-10-26 RX ADMIN — SODIUM CITRATE AND CITRIC ACID MONOHYDRATE 30 ML: 500; 334 SOLUTION ORAL at 09:13

## 2023-10-26 RX ADMIN — FENTANYL CITRATE 50 MCG: 50 INJECTION, SOLUTION INTRAMUSCULAR; INTRAVENOUS at 10:46

## 2023-10-26 RX ADMIN — ONDANSETRON 4 MG: 2 INJECTION INTRAMUSCULAR; INTRAVENOUS at 11:19

## 2023-10-26 RX ADMIN — LIDOCAINE HYDROCHLORIDE 100 MG: 20 INJECTION, SOLUTION EPIDURAL; INFILTRATION; INTRACAUDAL at 10:43

## 2023-10-26 RX ADMIN — PROPOFOL 200 MG: 10 INJECTION, EMULSION INTRAVENOUS at 10:43

## 2023-10-26 RX ADMIN — MIDAZOLAM 2 MG: 1 INJECTION INTRAMUSCULAR; INTRAVENOUS at 10:40

## 2023-10-26 RX ADMIN — DOXYCYCLINE HYCLATE 200 MG: 100 TABLET, COATED ORAL at 09:14

## 2023-10-26 ASSESSMENT — PAIN SCALES - GENERAL
PAINLEVEL_OUTOF10: 3
PAINLEVEL_OUTOF10: 3
PAINLEVEL_OUTOF10: 0
PAINLEVEL_OUTOF10: 0
PAINLEVEL_OUTOF10: 3
PAINLEVEL_OUTOF10: 0

## 2023-10-26 ASSESSMENT — PAIN DESCRIPTION - ORIENTATION
ORIENTATION: ANTERIOR

## 2023-10-26 ASSESSMENT — PAIN DESCRIPTION - LOCATION
LOCATION: ABDOMEN

## 2023-10-26 ASSESSMENT — PAIN - FUNCTIONAL ASSESSMENT: PAIN_FUNCTIONAL_ASSESSMENT: 0-10

## 2023-10-26 NOTE — H&P
Patient seen and evaluated prior to surgery. Patient reports no changes in medical condition. There have been no changes in the patient's medications or assessment. Preoperative tests and diagnostics have been reviewed. Surgery remains indicated as noted in History and Physical (H&P). Prophylactic antibiotics, use of beta-blockers and VTE prophylaxis have been addressed/ordered as indicated. Please see H&P and orders.       Mindi Villagomez,

## 2023-10-26 NOTE — DISCHARGE INSTR - ACTIVITY
Call for increased pain, significant vaginal bleeding (soaking through 2 pads in < 1hr) or temperature greater than 101 degrees F. Nothing in vagina for 2 weeks (pelvic rest), including intercourse, tampons, toys, fingers. Advance activity as tolerated but limit lifting <10 lbs for 5-7d. Take PRN medications as prescribed. FU in office in 2 weeks. Normal Diet     Please call with questions or concerns.    Janet Taylor, DO

## 2023-10-26 NOTE — OP NOTE
Operative Note      Patient: Sola Ruggiero  YOB: 1990  MRN: [id number]    Date of Procedure: 10/26/2023    Pre-Op Diagnosis Codes:     * Missed  [O02.1]    Post-Op Diagnosis: Same       Procedure(s):  SUCTION DILATATION AND CURETTAGE    Surgeon(s): Gregory Orta DO    Assistant:   Surgical Assistant: Ryan Ventura    Anesthesia: General    Estimated Blood Loss (mL): less than 50     Complications: None    Specimens:   ID Type Source Tests Collected by Time Destination   A : products of conception  Genital Vaginal SURGICAL PATHOLOGY Gregory Orta DO 10/26/2023 1052        Implants:  * No implants in log *      Drains: * No LDAs found *    Findings:   Norm zeny ext genitalia, vagina and cervix   Products of conception     Indications:   Patient presented to the office yesterday for viability US due to bleeding in early pregnancy. No fetal heart beat was noted on scan consistent with missed AB. Pt was counseled on her options for medical or surgical treatment vs expectant management. She elected for Suction Dilation and Curettage. Risks benefits and alternatives reviewed. Consents signed in office. Detailed Description of Procedure:   Patient was taken to the operating room where general anesthesia was administered and found to be adequate. She was placed on the operating table in the dorsal lithotomy position with yellowfin stirrups. She was prepped and draped in the normal sterile fashion. Universal time out was conducted and all parties agreed accurate information. Doxycycline was given prior to start of procedure. Weighted speculum was placed in the posterior fornix of the vagina exposing the cervix. A single-tooth tenaculum was then placed on the anterior lip of the cervix. A #7 Suction curette was then attached to suction and placed through dilated cervical os. Several passes with curette were made until products of conception cleared.  Suction was then

## 2023-11-06 ENCOUNTER — OFFICE VISIT (OUTPATIENT)
Dept: OBGYN CLINIC | Age: 33
End: 2023-11-06

## 2023-11-06 VITALS
DIASTOLIC BLOOD PRESSURE: 70 MMHG | WEIGHT: 183.4 LBS | TEMPERATURE: 97.7 F | SYSTOLIC BLOOD PRESSURE: 120 MMHG | BODY MASS INDEX: 30.52 KG/M2

## 2023-11-06 DIAGNOSIS — Z48.89 POSTOPERATIVE VISIT: Primary | ICD-10-CM

## 2023-11-06 DIAGNOSIS — N96 HISTORY OF RECURRENT MISCARRIAGES: ICD-10-CM

## 2023-11-06 DIAGNOSIS — Z98.890 S/P DILATATION AND CURETTAGE: ICD-10-CM

## 2023-11-06 DIAGNOSIS — O02.1 MISSED ABORTION: ICD-10-CM

## 2023-11-06 PROCEDURE — 99024 POSTOP FOLLOW-UP VISIT: CPT | Performed by: OBSTETRICS & GYNECOLOGY

## 2024-05-20 ENCOUNTER — INITIAL PRENATAL (OUTPATIENT)
Dept: OBGYN CLINIC | Age: 34
End: 2024-05-20
Payer: COMMERCIAL

## 2024-05-20 VITALS
BODY MASS INDEX: 31.16 KG/M2 | OXYGEN SATURATION: 98 % | HEART RATE: 89 BPM | TEMPERATURE: 98.9 F | DIASTOLIC BLOOD PRESSURE: 71 MMHG | SYSTOLIC BLOOD PRESSURE: 123 MMHG | HEIGHT: 65 IN | WEIGHT: 187 LBS

## 2024-05-20 DIAGNOSIS — O09.299 HISTORY OF FETAL ANOMALY IN PRIOR PREGNANCY, CURRENTLY PREGNANT: ICD-10-CM

## 2024-05-20 DIAGNOSIS — Z34.91 PRENATAL CARE IN FIRST TRIMESTER: Primary | ICD-10-CM

## 2024-05-20 DIAGNOSIS — O02.1 MISSED ABORTION WITH FETAL DEMISE BEFORE 20 COMPLETED WEEKS OF GESTATION: ICD-10-CM

## 2024-05-20 PROCEDURE — 36415 COLL VENOUS BLD VENIPUNCTURE: CPT | Performed by: OBSTETRICS & GYNECOLOGY

## 2024-05-20 PROCEDURE — 0500F INITIAL PRENATAL CARE VISIT: CPT | Performed by: OBSTETRICS & GYNECOLOGY

## 2024-05-20 RX ORDER — CALCIUM CARBONATE 500 MG/1
1 TABLET, CHEWABLE ORAL DAILY
COMMUNITY

## 2024-05-20 RX ORDER — FOLIC ACID 1 MG/1
4000 TABLET ORAL DAILY
COMMUNITY
Start: 2024-05-07

## 2024-05-20 RX ORDER — ASPIRIN 81 MG/1
81 TABLET, CHEWABLE ORAL DAILY
COMMUNITY

## 2024-05-20 RX ORDER — PROGESTERONE 100 MG/1
CAPSULE ORAL
COMMUNITY
Start: 2024-05-08

## 2024-05-20 NOTE — PROGRESS NOTES
Initial OB Office Visit    CC:   Chief Complaint   Patient presents with    Initial Prenatal Visit       HPI:  Pt seen and examined. No concerns/complaints. Some nausea, using Tums with some relief. No VB, some normal cramping. +constipation.    OB History    Para Term  AB Living   3 0 0 0 2     SAB IAB Ectopic Molar Multiple Live Births   2 0 0 0          # Outcome Date GA Lbr [place]/2nd Weight Sex Delivery Anes PTL Lv   3 Current            2 SAB 10/2023 8w0d          1 SAB 23 16w6d    Vag-Spont Spinal N FD       Past Medical History:   Diagnosis Date    Atypical squamous cell changes of undetermined significance (ASCUS) on cervical cytology with positive high risk human papilloma virus (HPV) 10/26/2018    Ovarian cyst        Past Surgical History:   Procedure Laterality Date    COLONOSCOPY N/A 2019    DILATION AND CURETTAGE OF UTERUS N/A 3/11/2023    DILATATION AND CURETTAGE performed by Anahy Chávez DO at [facility] OR    DILATION AND CURETTAGE OF UTERUS N/A 10/26/2023    SUCTION DILATATION AND CURETTAGE performed by Toyin Austin DO at [facility] OR    LAPAROSCOPY N/A 2021    DIAGNOSTIC LAPAROSCOPY, RIGHT OVARIAN CYSTECTOMY, POSSIBLE OOPHERECTOMY performed by Key Vaz MD at [facility] OR       Current Outpatient Medications on File Prior to Visit   Medication Sig Dispense Refill    progesterone (PROMETRIUM) 100 MG CAPS capsule       folic acid (FOLVITE) 1 MG tablet Take 4 tablets by mouth daily      calcium carbonate (TUMS) 500 MG chewable tablet Take 1 tablet by mouth daily      aspirin 81 MG chewable tablet Take 1 tablet by mouth daily      Prenatal Vit-DSS-Fe Fum-FA (PNV FE FUM/DOCUSATE/FOLIC ACID PO) Take by mouth       No current facility-administered medications on file prior to visit.     No Known Allergies      Objective:  /71   Pulse 89   Temp 98.9 °F (37.2 °C) (Temporal)   Ht 1.651 m (5' 5\")   Wt 84.8 kg (187 lb)   LMP 2023 Comment: missed AB

## 2024-05-21 LAB
ABO + RH BLD: NORMAL
AMPHETAMINES UR QL SCN>1000 NG/ML: NORMAL
BACTERIA URNS QL MICRO: ABNORMAL /HPF
BARBITURATES UR QL SCN>200 NG/ML: NORMAL
BASOPHILS # BLD: 0 K/UL (ref 0–0.2)
BASOPHILS NFR BLD: 0.4 %
BENZODIAZ UR QL SCN>200 NG/ML: NORMAL
BILIRUB UR QL STRIP.AUTO: NEGATIVE
BLD GP AB SCN SERPL QL: NORMAL
BUPRENORPHINE+NOR UR QL SCN: NORMAL
C TRACH DNA UR QL NAA+PROBE: NEGATIVE
CANNABINOIDS UR QL SCN>50 NG/ML: NORMAL
CLARITY UR: CLEAR
COCAINE UR QL SCN: NORMAL
COLOR UR: YELLOW
DEPRECATED RDW RBC AUTO: 12.7 % (ref 12.4–15.4)
DRUG SCREEN COMMENT UR-IMP: NORMAL
EOSINOPHIL # BLD: 0.1 K/UL (ref 0–0.6)
EOSINOPHIL NFR BLD: 0.5 %
EPI CELLS #/AREA URNS AUTO: 1 /HPF (ref 0–5)
FENTANYL SCREEN, URINE: NORMAL
GLUCOSE UR STRIP.AUTO-MCNC: 100 MG/DL
HBV SURFACE AG SERPL QL IA: NORMAL
HCT VFR BLD AUTO: 40.4 % (ref 36–48)
HGB BLD-MCNC: 13.9 G/DL (ref 12–16)
HGB UR QL STRIP.AUTO: NEGATIVE
HIV 1+2 AB+HIV1 P24 AG SERPL QL IA: NORMAL
HIV 2 AB SERPL QL IA: NORMAL
HIV1 AB SERPL QL IA: NORMAL
HIV1 P24 AG SERPL QL IA: NORMAL
HYALINE CASTS #/AREA URNS AUTO: 0 /LPF (ref 0–8)
KETONES UR STRIP.AUTO-MCNC: NEGATIVE MG/DL
LEUKOCYTE ESTERASE UR QL STRIP.AUTO: NEGATIVE
LYMPHOCYTES # BLD: 2.2 K/UL (ref 1–5.1)
LYMPHOCYTES NFR BLD: 21.7 %
MCH RBC QN AUTO: 32.3 PG (ref 26–34)
MCHC RBC AUTO-ENTMCNC: 34.3 G/DL (ref 31–36)
MCV RBC AUTO: 94.2 FL (ref 80–100)
METHADONE UR QL SCN>300 NG/ML: NORMAL
MONOCYTES # BLD: 0.6 K/UL (ref 0–1.3)
MONOCYTES NFR BLD: 6 %
N GONORRHOEA DNA UR QL NAA+PROBE: NEGATIVE
NEUTROPHILS # BLD: 7.4 K/UL (ref 1.7–7.7)
NEUTROPHILS NFR BLD: 71.4 %
NITRITE UR QL STRIP.AUTO: NEGATIVE
OPIATES UR QL SCN>300 NG/ML: NORMAL
OXYCODONE UR QL SCN: NORMAL
PCP UR QL SCN>25 NG/ML: NORMAL
PH UR STRIP.AUTO: 6 [PH] (ref 5–8)
PH UR STRIP: 5 [PH]
PLATELET # BLD AUTO: 204 K/UL (ref 135–450)
PMV BLD AUTO: 8.8 FL (ref 5–10.5)
PROT UR STRIP.AUTO-MCNC: NEGATIVE MG/DL
RBC # BLD AUTO: 4.29 M/UL (ref 4–5.2)
RBC CLUMPS #/AREA URNS AUTO: 5 /HPF (ref 0–4)
REAGIN+T PALLIDUM IGG+IGM SERPL-IMP: NORMAL
RUBV IGG SERPL IA-ACNC: 39.7 IU/ML
SP GR UR STRIP.AUTO: 1.01 (ref 1–1.03)
UA DIPSTICK W REFLEX MICRO PNL UR: ABNORMAL
URN SPEC COLLECT METH UR: ABNORMAL
UROBILINOGEN UR STRIP-ACNC: 0.2 E.U./DL
VZV IGG SER QL IA: NORMAL
WBC # BLD AUTO: 10.3 K/UL (ref 4–11)
WBC #/AREA URNS AUTO: 1 /HPF (ref 0–5)

## 2024-05-22 LAB
HCV AB S/CO SERPL IA: 0.05 IV
HCV AB SERPL QL IA: NEGATIVE

## 2024-05-22 NOTE — ASSESSMENT & PLAN NOTE
- h/o elevated AFP in G1, noted to have fetal demise at 16 weeks with gastrochisis  - on folic acid  - early MFM consult placed, they recommend early anatomy  - will plan for repeat AFP at appropriate time in pregnancy

## 2024-05-22 NOTE — ASSESSMENT & PLAN NOTE
- FWB: reassuring by US today  - Genetic screening: plan MQS  - Anatomy scan: plan at 20 weeks with MFM -- early anatomy also recommended (see below)  - Flu shot:   - Tdap: 28 weeks  - PNL: sent today

## 2024-05-22 NOTE — ASSESSMENT & PLAN NOTE
- h/o pregnancy loss at 16 weeks  - indeterminant APLAS work-up, saw MFM, would not recommend anticoagulation   - continue baby aspirin through pregnancy

## 2024-05-23 LAB
BACTERIA UR CULT: ABNORMAL
ORGANISM: ABNORMAL

## 2024-06-01 ENCOUNTER — TELEPHONE (OUTPATIENT)
Dept: OBGYN | Age: 34
End: 2024-06-01

## 2024-06-01 NOTE — TELEPHONE ENCOUNTER
Patient called answering service. Patient reports that Thursday afternoon she started to not feel well. She tried unisom and vitamin b 6. She reports that she was able to keep a smoothie down. She reports that she is feeling nausea now but has not had vomiting since yesterday. She denies any bleeding or cramping. She reports some stomach cramping. She reports some chills but she has been afebrile. She reports some diarrhea mixed with constipation. She denies any sick contact. She denies eating anything abnormal. Encouraged BRAT diet. Encouraged patient to call back if symptoms worsen or go to the ER.

## 2024-06-03 ENCOUNTER — TELEPHONE (OUTPATIENT)
Dept: OBGYN CLINIC | Age: 34
End: 2024-06-03

## 2024-06-03 RX ORDER — FOLIC ACID 1 MG/1
4000 TABLET ORAL DAILY
Qty: 120 TABLET | Refills: 2 | Status: SHIPPED | OUTPATIENT
Start: 2024-06-03

## 2024-06-03 NOTE — TELEPHONE ENCOUNTER
Pt is calling for refill of medication. Please advise Pharmacy is [facility] in [place]

## 2024-06-03 NOTE — TELEPHONE ENCOUNTER
Faxed all records and labs again. There was not an Ultrasound done at this Appt due to Pt came from [facility]. Genetic testing isnt back yet will send genetic testing when it is completed.   Thanks  Kamila

## 2024-06-03 NOTE — TELEPHONE ENCOUNTER
Received call from [facility], Please complete referral information . Please fill in XI and resend, please include all labs done this pregnancy and any and all genetic testing as well as all US that were done for this pregnancy episode. This is the requirement of all referral that are sent. Routing to [place] for follow up

## 2024-06-03 NOTE — TELEPHONE ENCOUNTER
11W4D. Spoke to on call physician on 6/1/24. Pt states she has been on the BRAT diet. She has tried to hydrate as well but does not know what to do. She is still having nausea and Diarrhea.  She reports she has had a hard time keeping down food. She has cramping after eating and is concerned for GI bug. Pt advised to try ice chips, sips of water to stay hydrated. Anti emetics and imodium to treat symptoms. Pt advised if unable to keep down food or fluid, feeling light headed or dizzy to present to ER for evaluation. Please advise

## 2024-06-11 ENCOUNTER — ROUTINE PRENATAL (OUTPATIENT)
Dept: OBGYN CLINIC | Age: 34
End: 2024-06-11

## 2024-06-11 VITALS
BODY MASS INDEX: 30.75 KG/M2 | HEART RATE: 85 BPM | HEIGHT: 65 IN | OXYGEN SATURATION: 99 % | WEIGHT: 184.6 LBS | DIASTOLIC BLOOD PRESSURE: 74 MMHG | TEMPERATURE: 97.8 F | SYSTOLIC BLOOD PRESSURE: 122 MMHG

## 2024-06-11 DIAGNOSIS — Z34.91 PRENATAL CARE IN FIRST TRIMESTER: Primary | ICD-10-CM

## 2024-06-11 DIAGNOSIS — O02.1 MISSED ABORTION WITH FETAL DEMISE BEFORE 20 COMPLETED WEEKS OF GESTATION: ICD-10-CM

## 2024-06-11 DIAGNOSIS — O09.299 HISTORY OF FETAL ANOMALY IN PRIOR PREGNANCY, CURRENTLY PREGNANT: ICD-10-CM

## 2024-06-11 PROCEDURE — 0502F SUBSEQUENT PRENATAL CARE: CPT | Performed by: OBSTETRICS & GYNECOLOGY

## 2024-06-11 NOTE — ASSESSMENT & PLAN NOTE
- h/o elevated AFP in G1, noted to have fetal demise at 16 weeks with gastrochisis  - on folic acid  - early MFM consult placed, they recommend early anatomy -- scheduled 6/20/24  - will plan for repeat AFP at appropriate time in pregnancy

## 2024-06-11 NOTE — ASSESSMENT & PLAN NOTE
- FWB: reassuring by roman today  - Genetic screening: plan MQS at next  visit  - Anatomy scan: plan at 20 weeks with MFM -- early anatomy also recommended (scheduled 6/20/24)  - Flu shot:   - Tdap: 28 weeks  - PNL: O+/ab-, RI, HepB/C neg, RPRnr, HIV neg, VZV immune, Hgb 13.9, UDS neg, GCCT neg, Ucx +staph epidermidis (likely contaminant, resent today)

## 2024-06-11 NOTE — PROGRESS NOTES
Return OB Office Visit    CC:   Chief Complaint   Patient presents with    Routine Prenatal Visit       HPI:  Pt seen and examined. No concerns/complaints. Denies VB, LOF, cramps. No FM yet.    Had a stomach bug for 4 days last week, was having nausea/vomiting, diarrhea, fatigue. Feeling better now, eating and hydrating more now. No fevers/chills.     Objective:  /74   Pulse 85   Temp 97.8 °F (36.6 °C) (Temporal)   Ht 1.651 m (5' 5\")   Wt 83.7 kg (184 lb 9.6 oz)   LMP 2023 Comment: missed AB 10/25/23  SpO2 99%   BMI 30.72 kg/m²   Gen: AO, NAD  Abd: Soft, NT  FHT: 156    Assessment/Plan:  33 y.o.  at 12w5d (Estimated Date of Delivery: 24) presents for VITO appointment:     Problem List Items Addressed This Visit       Missed  with fetal demise before 20 completed weeks of gestation     - h/o pregnancy loss at 16 weeks  - indeterminant APLAS work-up, saw MFM, would not recommend anticoagulation   - continue baby aspirin through pregnancy          Prenatal care in first trimester - Primary     - FWB: reassuring by roman today  - Genetic screening: plan MQS at next  visit  - Anatomy scan: plan at 20 weeks with MFM -- early anatomy also recommended (scheduled 24)  - Flu shot:   - Tdap: 28 weeks  - PNL: O+/ab-, RI, HepB/C neg, RPRnr, HIV neg, VZV immune, Hgb 13.9, UDS neg, GCCT neg, Ucx +staph epidermidis (likely contaminant, resent today)          Relevant Orders    Culture, Urine    History of fetal anomaly in prior pregnancy, currently pregnant     - h/o elevated AFP in G1, noted to have fetal demise at 16 weeks with gastrochisis  - on folic acid  - early MFM consult placed, they recommend early anatomy -- scheduled 24  - will plan for repeat AFP at appropriate time in pregnancy           Nausea/vomiting/diarrhea have resolved, suspect GI bug, no further work-up at this time, return precautions discussed      Dispo: RTC in 4 weeks, MFM   Key Vaz MD

## 2024-06-13 LAB — BACTERIA UR CULT: NORMAL

## 2024-07-11 ENCOUNTER — ROUTINE PRENATAL (OUTPATIENT)
Dept: OBGYN CLINIC | Age: 34
End: 2024-07-11
Payer: COMMERCIAL

## 2024-07-11 VITALS
BODY MASS INDEX: 32.22 KG/M2 | DIASTOLIC BLOOD PRESSURE: 58 MMHG | HEART RATE: 61 BPM | WEIGHT: 193.6 LBS | SYSTOLIC BLOOD PRESSURE: 123 MMHG

## 2024-07-11 DIAGNOSIS — O02.1 MISSED ABORTION WITH FETAL DEMISE BEFORE 20 COMPLETED WEEKS OF GESTATION: ICD-10-CM

## 2024-07-11 DIAGNOSIS — Z34.92 PRENATAL CARE IN SECOND TRIMESTER: Primary | ICD-10-CM

## 2024-07-11 DIAGNOSIS — O09.299 HISTORY OF FETAL ANOMALY IN PRIOR PREGNANCY, CURRENTLY PREGNANT: ICD-10-CM

## 2024-07-11 PROCEDURE — 36415 COLL VENOUS BLD VENIPUNCTURE: CPT | Performed by: OBSTETRICS & GYNECOLOGY

## 2024-07-11 PROCEDURE — 0502F SUBSEQUENT PRENATAL CARE: CPT | Performed by: OBSTETRICS & GYNECOLOGY

## 2024-07-11 NOTE — PROGRESS NOTES
Return OB Office Visit    CC:   Chief Complaint   Patient presents with    Routine Prenatal Visit       HPI:  Pt seen and examined. No concerns/complaints. Denies VB, LOF.. Some discharge. Some mild cramping. No fetal movement yet.     Maternal wellness questionnaire reviewed - no concerns today.     Objective:  BP (!) 123/58   Pulse 61   Wt 87.8 kg (193 lb 9.6 oz)   LMP 2023 Comment: missed AB 10/25/23  BMI 32.22 kg/m²   Gen: AO, NAD  Abd: Soft, NT  FHT: 145    Assessment/Plan:  34 y.o.  at 17w0d (Estimated Date of Delivery: 24) presents for VITO appointment:     Problem List Items Addressed This Visit       Missed  with fetal demise before 20 completed weeks of gestation     - h/o pregnancy loss at 16 weeks  - indeterminant APLAS work-up, saw MFM, would not recommend anticoagulation   - continue baby aspirin through pregnancy          Prenatal care in second trimester - Primary     - FWB: reassuring by roman today  - Genetic screening: MQS sent today  - Anatomy scan: early anatomy with MFM 24 (see below), 2nd trimester anatomy scheduled 24  - Flu shot:   - Tdap: 28 weeks  - PNL: O+/ab-, RI, HepB/C neg, RPRnr, HIV neg, VZV immune, Hgb 13.9, UDS neg, GCCT neg, Ucx +staph epidermidis (likely contaminant, resent today)         Relevant Orders    Maternal screen 4    History of fetal anomaly in prior pregnancy, currently pregnant     - h/o elevated AFP in G1, noted to have fetal demise at 16 weeks with gastrochisis  - on folic acid  - early MFM consult placed, they recommend early anatomy -- completed 24: 86g, overall normal early anatomy without evidence of abdominal wall defects, posterior placenta  - MQS/AFP sent today             Dispo: RTC in 4 weeks  Key Vaz MD

## 2024-07-12 NOTE — ASSESSMENT & PLAN NOTE
- FWB: reassuring by roman today  - Genetic screening: MQS sent today  - Anatomy scan: early anatomy with MFM 6/20/24 (see below), 2nd trimester anatomy scheduled 7/18/24  - Flu shot:   - Tdap: 28 weeks  - PNL: O+/ab-, RI, HepB/C neg, RPRnr, HIV neg, VZV immune, Hgb 13.9, UDS neg, GCCT neg, Ucx +staph epidermidis (likely contaminant, resent today)

## 2024-07-12 NOTE — ASSESSMENT & PLAN NOTE
- h/o elevated AFP in G1, noted to have fetal demise at 16 weeks with gastrochisis  - on folic acid  - early MFM consult placed, they recommend early anatomy -- completed 6/20/24: 86g, overall normal early anatomy without evidence of abdominal wall defects, posterior placenta  - MQS/AFP sent today

## 2024-07-13 LAB
# FETUSES US: NORMAL
AFP ADJ MOM AMN: 1.76
AFP SERPL-MCNC: 58 NG/ML
AGE - REPORTED: 32.5 YR
CURRENT SMOKER: NORMAL
FAMILY MEMBER DISEASES HX: NO
GA METHOD: NORMAL
GA: NORMAL WK
HCG MOM SERPL: 1.92
HCG SERPL-ACNC: NORMAL IU/L
HX OF HEREDITARY DISORDERS: NO
IDDM PATIENT QL: NO
INHIBIN A MOM SERPL: 1.04
INHIBIN A SERPL-MCNC: 143 PG/ML
INTEGRATED SCN PATIENT-IMP: NORMAL
PATHOLOGY STUDY: NORMAL
SPECIMEN DRAWN SERPL: NORMAL
U ESTRIOL MOM SERPL: 0.82
U ESTRIOL SERPL-MCNC: 1.32 NG/ML

## 2024-07-23 PROBLEM — O44.40 LOW-LYING PLACENTA: Status: ACTIVE | Noted: 2024-07-23

## 2024-08-09 ENCOUNTER — ROUTINE PRENATAL (OUTPATIENT)
Dept: OBGYN CLINIC | Age: 34
End: 2024-08-09

## 2024-08-09 VITALS
HEART RATE: 70 BPM | SYSTOLIC BLOOD PRESSURE: 116 MMHG | DIASTOLIC BLOOD PRESSURE: 70 MMHG | BODY MASS INDEX: 32.98 KG/M2 | WEIGHT: 198.2 LBS

## 2024-08-09 DIAGNOSIS — Z34.92 PRENATAL CARE IN SECOND TRIMESTER: Primary | ICD-10-CM

## 2024-08-09 DIAGNOSIS — N96 HISTORY OF RECURRENT MISCARRIAGES: ICD-10-CM

## 2024-08-09 DIAGNOSIS — O02.1 MISSED ABORTION WITH FETAL DEMISE BEFORE 20 COMPLETED WEEKS OF GESTATION: ICD-10-CM

## 2024-08-09 DIAGNOSIS — O09.299 HISTORY OF FETAL ANOMALY IN PRIOR PREGNANCY, CURRENTLY PREGNANT: ICD-10-CM

## 2024-08-09 DIAGNOSIS — Z36.89 ENCOUNTER FOR ULTRASOUND TO CHECK FETAL GROWTH: ICD-10-CM

## 2024-08-09 NOTE — PROGRESS NOTES
34 y.o.  at 21w1d EGA Estimated Date of Delivery: 24 here for VITO:     Pt seen and examined. No concerns/complaints.  Has growth US today   Denies VB, LOF, CTX. Endorses (+) FM. Denies fevers / chills / chest pain / shortness of breath.   Denies HA, changes with vision, RUQ pain, edema.   MWQ reviewed.     Objective:  /70   Pulse 70   Wt 89.9 kg (198 lb 3.2 oz)   LMP 2023 Comment: missed AB 10/25/23  BMI 32.98 kg/m²   Gen: AO, NAD  Abd: Soft, NT, gravid   Ext: Mild LE edema  OMM: Increased lumbar lordosis    Images:   OBSTETRICAL ULTRASOUND GROWTH     DATE: 2024     PHYSICIAN: LIGIA Austin D.O.      SONOGRAPHER: BUNNY Benson RDMS     INDICATION: Growth,      TYPE OF SCAN: vaginal, abdominal     FINDINGS:  A single viable intrauterine pregnancy is noted in B[place] presentation. Cardiac and somatic activity are noted.     The following values were obtained:              Fetal heart rate                                               149bpm              BPD                                                                 4.66cm                        12.0 %              Head Circumference                                       18.43cm                      25.3 %               Abdominal Circumference                              16.28cm                      50.0 %              Femur Length                                                  3.26cm                        13.0 %              Amniotic fluid index                                         4.12cm              EFW                                                                377g                27.0 percentile     Amniotic fluid volume is normal. Based on sonographic criteria the estimated fetal age is 20weeks and 4days with EDC of 2024. There is a 4 day discordance with the established EDC of 2024.      The patient has a posterior placenta that is adequate distance in relation to the internal cervical os. The evaluation of

## 2024-09-02 SDOH — ECONOMIC STABILITY: INCOME INSECURITY: HOW HARD IS IT FOR YOU TO PAY FOR THE VERY BASICS LIKE FOOD, HOUSING, MEDICAL CARE, AND HEATING?: NOT HARD AT ALL

## 2024-09-02 SDOH — ECONOMIC STABILITY: FOOD INSECURITY: WITHIN THE PAST 12 MONTHS, YOU WORRIED THAT YOUR FOOD WOULD RUN OUT BEFORE YOU GOT MONEY TO BUY MORE.: NEVER TRUE

## 2024-09-02 SDOH — ECONOMIC STABILITY: FOOD INSECURITY: WITHIN THE PAST 12 MONTHS, THE FOOD YOU BOUGHT JUST DIDN'T LAST AND YOU DIDN'T HAVE MONEY TO GET MORE.: NEVER TRUE

## 2024-09-05 ENCOUNTER — ROUTINE PRENATAL (OUTPATIENT)
Dept: OBGYN CLINIC | Age: 34
End: 2024-09-05

## 2024-09-05 VITALS
OXYGEN SATURATION: 98 % | BODY MASS INDEX: 33.95 KG/M2 | HEART RATE: 97 BPM | WEIGHT: 204 LBS | SYSTOLIC BLOOD PRESSURE: 105 MMHG | DIASTOLIC BLOOD PRESSURE: 65 MMHG | TEMPERATURE: 97.4 F

## 2024-09-05 DIAGNOSIS — O44.40 LOW-LYING PLACENTA: ICD-10-CM

## 2024-09-05 DIAGNOSIS — Z34.92 PRENATAL CARE IN SECOND TRIMESTER: Primary | ICD-10-CM

## 2024-09-05 DIAGNOSIS — O02.1 MISSED ABORTION WITH FETAL DEMISE BEFORE 20 COMPLETED WEEKS OF GESTATION: ICD-10-CM

## 2024-09-05 DIAGNOSIS — O09.299 HISTORY OF FETAL ANOMALY IN PRIOR PREGNANCY, CURRENTLY PREGNANT: ICD-10-CM

## 2024-09-05 PROCEDURE — 0502F SUBSEQUENT PRENATAL CARE: CPT | Performed by: OBSTETRICS & GYNECOLOGY

## 2024-09-05 NOTE — ASSESSMENT & PLAN NOTE
- FWB: reassuring by roman today  - Genetic screening: MQS low risk  - Anatomy scan: early anatomy with MFM 6/20/24 (see below), 2nd trimester anatomy scheduled 7/18/24    - MFM 7/18/24: 221g, nl anatomy (suboptimal 4ch, AA), choriod plexus cyst noted, posterior low-lying placenta, normal fluid, CL 3.61cm    - US 8/9/24: 377g (27%ile), resolved CP cyst, resolved low-lying placenta  - Flu shot: discuss next visit  - Tdap: 28 weeks  - PNL: O+/ab-, RI, HepB/C neg, RPRnr, HIV neg, VZV immune, Hgb 13.9, UDS neg, GCCT neg, Ucx neg    - GTT/CBC next visit

## 2024-09-05 NOTE — ASSESSMENT & PLAN NOTE
- h/o elevated AFP in G1, noted to have fetal demise at 16 weeks with gastrochisis  - on folic acid  - early MFM consult placed, they recommend early anatomy -- completed 6/20/24: 86g, overall normal early anatomy without evidence of abdominal wall defects, posterior placenta  - MQS/AFP low risk  - plan q4wk growth US

## 2024-09-05 NOTE — PROGRESS NOTES
Return OB Office Visit    CC:   Chief Complaint   Patient presents with    Routine Prenatal Visit       HPI:  Pt seen and examined. No concerns/complaints. Denies VB, LOF, cramps. +FM    Maternal wellness questionnaire reviewed - no concerns today.     Objective:  /65   Pulse 97   Temp 97.4 °F (36.3 °C) (Infrared)   Wt 92.5 kg (204 lb)   LMP 2023 Comment: missed AB 10/25/23  SpO2 98%   BMI 33.95 kg/m²   Gen: AO, NAD  Abd: Soft, NT  FHT: 143  FH: 25cm, unk by Le[place]    Assessment/Plan:  34 y.o.  at 25w0d (Estimated Date of Delivery: 24) presents for VITO appointment:     Problem List Items Addressed This Visit       Missed  with fetal demise before 20 completed weeks of gestation     - h/o pregnancy loss at 16 weeks  - indeterminant APLAS work-up, saw MFM, would not recommend anticoagulation   - continue baby aspirin through pregnancy          Prenatal care in second trimester - Primary     - FWB: reassuring by roman today  - Genetic screening: MQS low risk  - Anatomy scan: early anatomy with MFM 24 (see below), 2nd trimester anatomy scheduled 24    - MFM 24: 221g, nl anatomy (suboptimal 4ch, AA), choriod plexus cyst noted, posterior low-lying placenta, normal fluid, CL 3.61cm    - US 24: 377g (27%ile), resolved CP cyst, resolved low-lying placenta  - Flu shot: discuss next visit  - Tdap: 28 weeks  - PNL: O+/ab-, RI, HepB/C neg, RPRnr, HIV neg, VZV immune, Hgb 13.9, UDS neg, GCCT neg, Ucx neg    - GTT/CBC next visit          History of fetal anomaly in prior pregnancy, currently pregnant     - h/o elevated AFP in G1, noted to have fetal demise at 16 weeks with gastrochisis  - on folic acid  - early MFM consult placed, they recommend early anatomy -- completed 24: 86g, overall normal early anatomy without evidence of abdominal wall defects, posterior placenta  - MQS/AFP low risk  - plan q4wk growth US          Low-lying placenta (RESOLVED)       Dispo:

## 2024-09-11 RX ORDER — FOLIC ACID 1 MG/1
4000 TABLET ORAL DAILY
Qty: 120 TABLET | Refills: 2 | Status: SHIPPED | OUTPATIENT
Start: 2024-09-11

## 2024-09-27 ENCOUNTER — ROUTINE PRENATAL (OUTPATIENT)
Dept: OBGYN CLINIC | Age: 34
End: 2024-09-27
Payer: COMMERCIAL

## 2024-09-27 VITALS
BODY MASS INDEX: 34.59 KG/M2 | SYSTOLIC BLOOD PRESSURE: 118 MMHG | HEIGHT: 65 IN | OXYGEN SATURATION: 98 % | DIASTOLIC BLOOD PRESSURE: 80 MMHG | WEIGHT: 207.6 LBS | HEART RATE: 89 BPM | TEMPERATURE: 97.6 F

## 2024-09-27 DIAGNOSIS — O09.299 HISTORY OF FETAL ANOMALY IN PRIOR PREGNANCY, CURRENTLY PREGNANT: ICD-10-CM

## 2024-09-27 DIAGNOSIS — O02.1 MISSED ABORTION WITH FETAL DEMISE BEFORE 20 COMPLETED WEEKS OF GESTATION: ICD-10-CM

## 2024-09-27 DIAGNOSIS — R87.810 ATYPICAL SQUAMOUS CELL CHANGES OF UNDETERMINED SIGNIFICANCE (ASCUS) ON CERVICAL CYTOLOGY WITH POSITIVE HIGH RISK HUMAN PAPILLOMA VIRUS (HPV): ICD-10-CM

## 2024-09-27 DIAGNOSIS — Z34.93 PRENATAL CARE IN THIRD TRIMESTER: Primary | ICD-10-CM

## 2024-09-27 DIAGNOSIS — O44.40 LOW-LYING PLACENTA: ICD-10-CM

## 2024-09-27 DIAGNOSIS — R87.610 ATYPICAL SQUAMOUS CELL CHANGES OF UNDETERMINED SIGNIFICANCE (ASCUS) ON CERVICAL CYTOLOGY WITH POSITIVE HIGH RISK HUMAN PAPILLOMA VIRUS (HPV): ICD-10-CM

## 2024-09-27 PROCEDURE — 0502F SUBSEQUENT PRENATAL CARE: CPT | Performed by: OBSTETRICS & GYNECOLOGY

## 2024-09-27 PROCEDURE — 36415 COLL VENOUS BLD VENIPUNCTURE: CPT | Performed by: OBSTETRICS & GYNECOLOGY

## 2024-09-27 PROCEDURE — 90471 IMMUNIZATION ADMIN: CPT | Performed by: OBSTETRICS & GYNECOLOGY

## 2024-09-27 PROCEDURE — 90472 IMMUNIZATION ADMIN EACH ADD: CPT | Performed by: OBSTETRICS & GYNECOLOGY

## 2024-09-27 PROCEDURE — 90715 TDAP VACCINE 7 YRS/> IM: CPT | Performed by: OBSTETRICS & GYNECOLOGY

## 2024-09-27 PROCEDURE — 90661 CCIIV3 VAC ABX FR 0.5 ML IM: CPT | Performed by: OBSTETRICS & GYNECOLOGY

## 2024-09-28 LAB
BASOPHILS # BLD: 0 K/UL (ref 0–0.2)
BASOPHILS NFR BLD: 0.3 %
DEPRECATED RDW RBC AUTO: 13.6 % (ref 12.4–15.4)
EOSINOPHIL # BLD: 0.1 K/UL (ref 0–0.6)
EOSINOPHIL NFR BLD: 0.7 %
GLUCOSE 1H P 50 G GLC PO SERPL-MCNC: 261 MG/DL
HCT VFR BLD AUTO: 36.7 % (ref 36–48)
HGB BLD-MCNC: 12.8 G/DL (ref 12–16)
LYMPHOCYTES # BLD: 1.7 K/UL (ref 1–5.1)
LYMPHOCYTES NFR BLD: 16.1 %
MCH RBC QN AUTO: 32.3 PG (ref 26–34)
MCHC RBC AUTO-ENTMCNC: 34.9 G/DL (ref 31–36)
MCV RBC AUTO: 92.7 FL (ref 80–100)
MONOCYTES # BLD: 0.8 K/UL (ref 0–1.3)
MONOCYTES NFR BLD: 7.5 %
NEUTROPHILS # BLD: 8.1 K/UL (ref 1.7–7.7)
NEUTROPHILS NFR BLD: 75.4 %
PLATELET # BLD AUTO: 151 K/UL (ref 135–450)
PMV BLD AUTO: 8.6 FL (ref 5–10.5)
RBC # BLD AUTO: 3.96 M/UL (ref 4–5.2)
WBC # BLD AUTO: 10.8 K/UL (ref 4–11)

## 2024-10-01 ENCOUNTER — LAB (OUTPATIENT)
Dept: OBGYN CLINIC | Age: 34
End: 2024-10-01
Payer: COMMERCIAL

## 2024-10-01 DIAGNOSIS — E74.39 GLUCOSE INTOLERANCE: Primary | ICD-10-CM

## 2024-10-01 PROCEDURE — 36415 COLL VENOUS BLD VENIPUNCTURE: CPT | Performed by: OBSTETRICS & GYNECOLOGY

## 2024-10-01 NOTE — PROGRESS NOTES
Blood draw from R Antecubital x 1 attempt without difficulty. 1 SST, Glucose Tolerance given,  Patient tolerated well. María Tucker LPN     Patient completed at 0816. Draw times 0916, 1016 and 1116.

## 2024-10-02 ENCOUNTER — TELEPHONE (OUTPATIENT)
Dept: OBGYN CLINIC | Age: 34
End: 2024-10-02

## 2024-10-02 DIAGNOSIS — O24.410 DIET CONTROLLED GESTATIONAL DIABETES MELLITUS (GDM) IN SECOND TRIMESTER: Primary | ICD-10-CM

## 2024-10-02 LAB
GLUCOSE 1H P 100 G GLC PO SERPL-MCNC: 248 MG/DL
GLUCOSE 2H P 100 G GLC PO SERPL-MCNC: 248 MG/DL
GLUCOSE 3H P 100 G GLC PO SERPL-MCNC: 181 MG/DL
GLUCOSE BS SERPL-MCNC: 106 MG/DL

## 2024-10-09 ENCOUNTER — HOSPITAL ENCOUNTER (OUTPATIENT)
Dept: DIABETES SERVICES | Age: 34
Setting detail: THERAPIES SERIES
Discharge: HOME | End: 2024-10-09
Attending: OBSTETRICS & GYNECOLOGY

## 2024-10-09 DIAGNOSIS — O24.419 GESTATIONAL DIABETES MELLITUS (GDM), ANTEPARTUM, GESTATIONAL DIABETES METHOD OF CONTROL UNSPECIFIED: Primary | ICD-10-CM

## 2024-10-11 ENCOUNTER — ROUTINE PRENATAL (OUTPATIENT)
Dept: OBGYN CLINIC | Age: 34
End: 2024-10-11

## 2024-10-11 VITALS
HEART RATE: 88 BPM | BODY MASS INDEX: 34.68 KG/M2 | WEIGHT: 208.4 LBS | OXYGEN SATURATION: 99 % | SYSTOLIC BLOOD PRESSURE: 116 MMHG | DIASTOLIC BLOOD PRESSURE: 64 MMHG

## 2024-10-11 DIAGNOSIS — O24.410 DIET CONTROLLED GESTATIONAL DIABETES MELLITUS (GDM) IN SECOND TRIMESTER: ICD-10-CM

## 2024-10-11 DIAGNOSIS — O02.1 MISSED ABORTION WITH FETAL DEMISE BEFORE 20 COMPLETED WEEKS OF GESTATION: ICD-10-CM

## 2024-10-11 DIAGNOSIS — O44.40 LOW-LYING PLACENTA: ICD-10-CM

## 2024-10-11 DIAGNOSIS — O09.299 HISTORY OF FETAL ANOMALY IN PRIOR PREGNANCY, CURRENTLY PREGNANT: ICD-10-CM

## 2024-10-11 DIAGNOSIS — R87.610 ATYPICAL SQUAMOUS CELL CHANGES OF UNDETERMINED SIGNIFICANCE (ASCUS) ON CERVICAL CYTOLOGY WITH POSITIVE HIGH RISK HUMAN PAPILLOMA VIRUS (HPV): ICD-10-CM

## 2024-10-11 DIAGNOSIS — Z34.93 PRENATAL CARE IN THIRD TRIMESTER: Primary | ICD-10-CM

## 2024-10-11 DIAGNOSIS — R87.810 ATYPICAL SQUAMOUS CELL CHANGES OF UNDETERMINED SIGNIFICANCE (ASCUS) ON CERVICAL CYTOLOGY WITH POSITIVE HIGH RISK HUMAN PAPILLOMA VIRUS (HPV): ICD-10-CM

## 2024-10-11 PROCEDURE — 0502F SUBSEQUENT PRENATAL CARE: CPT | Performed by: OBSTETRICS & GYNECOLOGY

## 2024-10-11 RX ORDER — INSULIN HUMAN 100 [IU]/ML
4 INJECTION, SUSPENSION SUBCUTANEOUS NIGHTLY
Qty: 5 ADJUSTABLE DOSE PRE-FILLED PEN SYRINGE | Refills: 1 | Status: SHIPPED | OUTPATIENT
Start: 2024-10-11

## 2024-10-11 NOTE — PROGRESS NOTES
34 y.o.  at 30w1d EGA Estimated Date of Delivery: 24 here for VITO:     Pt seen and examined. No concerns/complaints.  Denies VB, LOF, CTX. Endorses (+) FM. Denies fevers / chills / chest pain / shortness of breath.   Denies HA, changes with vision, RUQ pain, edema.   Fingersticks reviewed and all fasting elevated as well as () postprandially     Objective:  /64 (Site: Left Upper Arm, Position: Sitting, Cuff Size: Medium Adult)   Pulse 88   Wt 94.5 kg (208 lb 6.4 oz)   LMP 2023 Comment: missed AB 10/25/23  SpO2 99%   BMI 34.68 kg/m²   Gen: AO, NAD  Abd: Soft, NT, gravid   Ext: Mild LE edema    Assessment/Plan:   Diagnosis Orders   1. Prenatal care in third trimester        2. Diet controlled gestational diabetes mellitus (GDM) in second trimester        3. Missed  with fetal demise before 20 completed weeks of gestation        4. Low-lying placenta (RESOLVED)        5. History of fetal anomaly in prior pregnancy, currently pregnant        6. Atypical squamous cell changes of undetermined significance (ASCUS) on cervical cytology with positive high risk human papilloma virus (HPV)           - started patient on NNPH 4 units at night.   - reassuring maternal / fetal status     Follow Up  Return OB precautions reviewed   No follow-ups on file.    Approximately 15 minutes spent in room counseling patient on condition and coordination of care with over 50% in direct face to face counseling.     Tea Lopes DO

## 2024-10-11 NOTE — PROGRESS NOTES
Gestational Diabetes Patient Assessment and Education    Name: Monet Valverde : 1990  EDC:24        Education Completed  [x]  Definition of Gestational Diabetes                                                                                 [x]  Risk Factors/Acute Complications     [x]  Blood Glucose Monitoring--schedule, target levels     [x]  Exercise/Activity    [x]  Carbohydrate Counting  [x]  Timing of Meals    [x]  Food Logs  [x]  Eating Out  [x]  Nutrition Precautions for Pregnancy  [x]  Use of Blood glucose meter            Patient Goals  [x]  Follow meal plan with appropriate Carbohydrate intake:   Breakfast: 30 g Carb    Lunch: 30 - 45 g Carb   Dinner:  45 - 60 g Carb    3 snacks: 15 - 30 g Carb each  [x] Monitor blood glucose four times daily.    FBG Goal: 60 - 95 or as recommended by physician   1hr PP <140 or as recommended by physician   2 hr PP <120 or as recommended by physician  [x] Exercise    [x] Log glucose results and bring to physician’s office at each scheduled appointment  [] Other:     Plan  [x] Return for follow-up visit to review Food/Log      Referring Provider: Karolina    Total participants in Group:Individual telephone 120 minutes

## 2024-10-15 ENCOUNTER — HOSPITAL ENCOUNTER (OUTPATIENT)
Dept: DIABETES SERVICES | Age: 34
Setting detail: THERAPIES SERIES
Discharge: HOME | End: 2024-10-15
Attending: OBSTETRICS & GYNECOLOGY

## 2024-10-15 DIAGNOSIS — O24.419 GESTATIONAL DIABETES MELLITUS (GDM), ANTEPARTUM, GESTATIONAL DIABETES METHOD OF CONTROL UNSPECIFIED: Primary | ICD-10-CM

## 2024-10-16 NOTE — PROGRESS NOTES
Gestational Diabetes Patient Assessment and Education    Name: Monet Valverde : 1990  EDC:24    BG/FOOD LOG REVIEW:  [x]  BG Log reviewed: FBG range:  mg/dl   PP BG range: 112-183 mg/dl  []  Other:    Education Completed  [x]  Problem Solving   [x]  Carbohydrate Counting  [x]  Timing of Meals    [x]  Post-Partum Guidelines  [x]  Diet guidelines for sick days  []  Additional Resources  []  Other:    Participant selected Post-Program Diabetes Self-Management Support Plan:   [] Nurse appointment  [] Dietitian appointment  [x] Follow-up with Referring Provider  [] Other:       Referring Provider: Татьяна  Total time spent with patient: 60 minute telephone

## 2024-10-17 ENCOUNTER — ROUTINE PRENATAL (OUTPATIENT)
Dept: OBGYN CLINIC | Age: 34
End: 2024-10-17

## 2024-10-17 VITALS
DIASTOLIC BLOOD PRESSURE: 72 MMHG | HEART RATE: 93 BPM | WEIGHT: 206.4 LBS | BODY MASS INDEX: 34.35 KG/M2 | SYSTOLIC BLOOD PRESSURE: 112 MMHG

## 2024-10-17 DIAGNOSIS — Z34.93 PRENATAL CARE IN THIRD TRIMESTER: Primary | ICD-10-CM

## 2024-10-17 DIAGNOSIS — O09.299 HISTORY OF FETAL ANOMALY IN PRIOR PREGNANCY, CURRENTLY PREGNANT: ICD-10-CM

## 2024-10-17 DIAGNOSIS — O24.414 INSULIN CONTROLLED GESTATIONAL DIABETES MELLITUS (GDM) IN THIRD TRIMESTER: ICD-10-CM

## 2024-10-17 DIAGNOSIS — O02.1 MISSED ABORTION WITH FETAL DEMISE BEFORE 20 COMPLETED WEEKS OF GESTATION: ICD-10-CM

## 2024-10-17 DIAGNOSIS — R87.810 ATYPICAL SQUAMOUS CELL CHANGES OF UNDETERMINED SIGNIFICANCE (ASCUS) ON CERVICAL CYTOLOGY WITH POSITIVE HIGH RISK HUMAN PAPILLOMA VIRUS (HPV): ICD-10-CM

## 2024-10-17 DIAGNOSIS — R87.610 ATYPICAL SQUAMOUS CELL CHANGES OF UNDETERMINED SIGNIFICANCE (ASCUS) ON CERVICAL CYTOLOGY WITH POSITIVE HIGH RISK HUMAN PAPILLOMA VIRUS (HPV): ICD-10-CM

## 2024-10-17 DIAGNOSIS — O44.40 LOW-LYING PLACENTA: ICD-10-CM

## 2024-10-17 PROCEDURE — 0502F SUBSEQUENT PRENATAL CARE: CPT | Performed by: OBSTETRICS & GYNECOLOGY

## 2024-10-18 PROBLEM — O32.9XX0 FETAL MALPRESENTATION: Status: ACTIVE | Noted: 2024-10-18

## 2024-10-18 PROBLEM — O24.414 INSULIN CONTROLLED GESTATIONAL DIABETES MELLITUS (GDM) IN THIRD TRIMESTER: Status: ACTIVE | Noted: 2024-10-18

## 2024-10-18 NOTE — ASSESSMENT & PLAN NOTE
- h/o elevated AFP in G1, noted to have fetal demise at 16 weeks with gastrochisis  - on folic acid  - early MFM consult placed, they recommend early anatomy -- completed 6/20/24: 86g, overall normal early anatomy without evidence of abdominal wall defects, posterior placenta  - MQS/AFP low risk  - plan q4wk growth US   - US 9/27/24: 1272g (59%ile)

## 2024-10-18 NOTE — ASSESSMENT & PLAN NOTE
BG logs reviewed  - has seen diabetes educator  - Increase NPH to 6 units at bedtime, will consider adding AM meds as well  - will need 2x/week ANFS starting at 32 weeks and q4wk growth US  - Delivery at 39 weeks or sooner as indicated

## 2024-10-18 NOTE — ASSESSMENT & PLAN NOTE
- FWB: reassuring by roman today  - Genetic screening: MQS low risk  - Anatomy scan: early anatomy with MFM 6/20/24 (see below), 2nd trimester anatomy scheduled 7/18/24    - MFM 7/18/24: 221g, nl anatomy (suboptimal 4ch, AA), choriod plexus cyst noted, posterior low-lying placenta, normal fluid, CL 3.61cm    - US 8/9/24: 377g (27%ile), resolved CP cyst, resolved low-lying placenta  - Flu shot: 9/27/24  - Tdap: 9/27/24  - RSV: 32-36 weeks  - PNL: O+/ab-, RI, HepB/C neg, RPRnr, HIV neg, VZV immune, Hgb 13.9, UDS neg, GCCT neg, Ucx neg    - 1hr and 3hr GTT abnormal (see below), Hgb 12.8

## 2024-10-24 ENCOUNTER — ROUTINE PRENATAL (OUTPATIENT)
Dept: OBGYN CLINIC | Age: 34
End: 2024-10-24
Payer: COMMERCIAL

## 2024-10-24 VITALS
SYSTOLIC BLOOD PRESSURE: 108 MMHG | BODY MASS INDEX: 34.66 KG/M2 | OXYGEN SATURATION: 98 % | DIASTOLIC BLOOD PRESSURE: 68 MMHG | WEIGHT: 208 LBS | HEART RATE: 80 BPM | TEMPERATURE: 98.1 F | HEIGHT: 65 IN

## 2024-10-24 DIAGNOSIS — O02.1 MISSED ABORTION WITH FETAL DEMISE BEFORE 20 COMPLETED WEEKS OF GESTATION: ICD-10-CM

## 2024-10-24 DIAGNOSIS — O09.299 HISTORY OF FETAL ANOMALY IN PRIOR PREGNANCY, CURRENTLY PREGNANT: ICD-10-CM

## 2024-10-24 DIAGNOSIS — O24.414 INSULIN CONTROLLED GESTATIONAL DIABETES MELLITUS (GDM) IN THIRD TRIMESTER: ICD-10-CM

## 2024-10-24 DIAGNOSIS — O44.40 LOW-LYING PLACENTA: ICD-10-CM

## 2024-10-24 DIAGNOSIS — Z34.93 PRENATAL CARE IN THIRD TRIMESTER: Primary | ICD-10-CM

## 2024-10-24 PROCEDURE — 0502F SUBSEQUENT PRENATAL CARE: CPT | Performed by: OBSTETRICS & GYNECOLOGY

## 2024-10-24 PROCEDURE — 59025 FETAL NON-STRESS TEST: CPT | Performed by: OBSTETRICS & GYNECOLOGY

## 2024-10-24 NOTE — PROGRESS NOTES
placenta (RESOLVED)    Insulin controlled gestational diabetes mellitus (GDM) in third trimester     BG logs reviewed  - has seen diabetes educator  - Increase NPH to 8 units at bedtime, will consider adding AM meds as well  - Continue 2x/week ANFS and q4wk growth US  - Delivery at 39 weeks or sooner as indicated          Relevant Orders    FETAL NON-STRESS TEST (Completed)    Fetal malpresentation     Breech, will continue to monitor/check  - ECV vs PLTCD to be discussed closer to term             Dispo: RTC in 3-4 days  Key Vaz MD

## 2024-10-25 NOTE — ASSESSMENT & PLAN NOTE
BG logs reviewed  - has seen diabetes educator  - Increase NPH to 8 units at bedtime, will consider adding AM meds as well  - Continue 2x/week ANFS and q4wk growth US  - Delivery at 39 weeks or sooner as indicated

## 2024-10-28 ENCOUNTER — ROUTINE PRENATAL (OUTPATIENT)
Dept: OBGYN CLINIC | Age: 34
End: 2024-10-28

## 2024-10-28 VITALS
HEART RATE: 84 BPM | DIASTOLIC BLOOD PRESSURE: 74 MMHG | WEIGHT: 205.8 LBS | BODY MASS INDEX: 34.25 KG/M2 | SYSTOLIC BLOOD PRESSURE: 124 MMHG

## 2024-10-28 DIAGNOSIS — M79.604 RIGHT LEG PAIN: ICD-10-CM

## 2024-10-28 DIAGNOSIS — R87.810 ATYPICAL SQUAMOUS CELL CHANGES OF UNDETERMINED SIGNIFICANCE (ASCUS) ON CERVICAL CYTOLOGY WITH POSITIVE HIGH RISK HUMAN PAPILLOMA VIRUS (HPV): ICD-10-CM

## 2024-10-28 DIAGNOSIS — Z34.93 PRENATAL CARE IN THIRD TRIMESTER: Primary | ICD-10-CM

## 2024-10-28 DIAGNOSIS — R87.610 ATYPICAL SQUAMOUS CELL CHANGES OF UNDETERMINED SIGNIFICANCE (ASCUS) ON CERVICAL CYTOLOGY WITH POSITIVE HIGH RISK HUMAN PAPILLOMA VIRUS (HPV): ICD-10-CM

## 2024-10-28 DIAGNOSIS — O02.1 MISSED ABORTION WITH FETAL DEMISE BEFORE 20 COMPLETED WEEKS OF GESTATION: ICD-10-CM

## 2024-10-28 DIAGNOSIS — O09.299 HISTORY OF FETAL ANOMALY IN PRIOR PREGNANCY, CURRENTLY PREGNANT: ICD-10-CM

## 2024-10-28 DIAGNOSIS — O24.414 INSULIN CONTROLLED GESTATIONAL DIABETES MELLITUS (GDM) IN THIRD TRIMESTER: ICD-10-CM

## 2024-10-28 DIAGNOSIS — O44.40 LOW-LYING PLACENTA: ICD-10-CM

## 2024-10-28 PROCEDURE — 0502F SUBSEQUENT PRENATAL CARE: CPT | Performed by: OBSTETRICS & GYNECOLOGY

## 2024-10-28 PROCEDURE — 99284 EMERGENCY DEPT VISIT MOD MDM: CPT

## 2024-10-29 ENCOUNTER — HOSPITAL ENCOUNTER (OUTPATIENT)
Dept: VASCULAR LAB | Age: 34
Discharge: HOME | End: 2024-10-31
Payer: COMMERCIAL

## 2024-10-29 ENCOUNTER — HOSPITAL ENCOUNTER (EMERGENCY)
Age: 34
Discharge: HOME | End: 2024-10-29
Payer: COMMERCIAL

## 2024-10-29 VITALS
BODY MASS INDEX: 34.42 KG/M2 | RESPIRATION RATE: 15 BRPM | OXYGEN SATURATION: 99 % | SYSTOLIC BLOOD PRESSURE: 111 MMHG | DIASTOLIC BLOOD PRESSURE: 73 MMHG | HEART RATE: 64 BPM | HEIGHT: 65 IN | WEIGHT: 206.6 LBS | TEMPERATURE: 98.1 F

## 2024-10-29 DIAGNOSIS — M79.605 LEFT LEG PAIN: ICD-10-CM

## 2024-10-29 DIAGNOSIS — M79.605 LEFT LEG PAIN: Primary | ICD-10-CM

## 2024-10-29 LAB — ECHO BSA: 2.07 M2

## 2024-10-29 PROCEDURE — 96372 THER/PROPH/DIAG INJ SC/IM: CPT

## 2024-10-29 PROCEDURE — 93971 EXTREMITY STUDY: CPT

## 2024-10-29 PROCEDURE — 6360000002 HC RX W HCPCS: Performed by: PHYSICIAN ASSISTANT

## 2024-10-29 RX ORDER — ENOXAPARIN SODIUM 150 MG/ML
1.5 INJECTION SUBCUTANEOUS ONCE
Status: COMPLETED | OUTPATIENT
Start: 2024-10-29 | End: 2024-10-29

## 2024-10-29 RX ADMIN — ENOXAPARIN SODIUM 135 MG: 150 INJECTION SUBCUTANEOUS at 01:14

## 2024-10-29 ASSESSMENT — LIFESTYLE VARIABLES
HOW OFTEN DO YOU HAVE A DRINK CONTAINING ALCOHOL: NEVER
HOW MANY STANDARD DRINKS CONTAINING ALCOHOL DO YOU HAVE ON A TYPICAL DAY: PATIENT DOES NOT DRINK

## 2024-10-29 ASSESSMENT — PAIN - FUNCTIONAL ASSESSMENT: PAIN_FUNCTIONAL_ASSESSMENT: 0-10

## 2024-10-29 ASSESSMENT — PAIN DESCRIPTION - ORIENTATION: ORIENTATION: LEFT

## 2024-10-29 ASSESSMENT — PAIN DESCRIPTION - LOCATION: LOCATION: LEG

## 2024-10-29 ASSESSMENT — PAIN DESCRIPTION - DESCRIPTORS: DESCRIPTORS: CRAMPING

## 2024-10-29 ASSESSMENT — PAIN DESCRIPTION - PAIN TYPE: TYPE: ACUTE PAIN

## 2024-10-29 ASSESSMENT — PAIN SCALES - GENERAL: PAINLEVEL_OUTOF10: 2

## 2024-10-29 NOTE — ED PROVIDER NOTES
sensation intact.     EKG  When ordered, EKG's are interpreted by the ED Physician in the absence of a Cardiologist.  Please see their note for interpretation of EKG.    LABS  Labs Reviewed - No data to display  When ordered, only abnormal lab results are displayed.  All other labs were within normal range or not returned as of this dictation.     RADIOLOGY  Non-plain film images such as CT, U/S, and MRI are read by the radiologist.  Plain radiographic images are visualized and preliminarily interpreted by the ED Provider with the below findings:     Interpretation per the Radiologist below, if available at the time of this note:  Vascular duplex lower extremity venous left    (Results Pending)     PROCEDURES  Unless otherwise noted below, none.    ED COURSE/DDx/MDM  History obtained from:  Patient    Vitals:  Vitals:    10/29/24 0007   BP: 104/66   Pulse: 68   Resp: 16   Temp: 98.1 °F (36.7 °C)   TempSrc: Oral   SpO2: 99%   Weight: 93.7 kg (206 lb 9.6 oz)   Height: 1.651 m (5' 5\")     Patient received following medications in ED:  Medications   enoxaparin (LOVENOX) injection 135 mg (135 mg SubCUTAneous Given 10/29/24 0114)     Sepsis Consideration:  Is this patient to be included in the SEP-1 Core Measure due to severe sepsis or septic shock?   No   Exclusion criteria - the patient is NOT to be included for SEP-1 Core Measure due to:  Infection is not suspected    Records Reviewed:   Brief chart review performed without relevant records identified.    Chronic conditions affecting care:   Ovarian cyst.   has a past medical history of Atypical squamous cell changes of undetermined significance (ASCUS) on cervical cytology with positive high risk human papilloma virus (HPV) (10/26/2018) and Ovarian cyst.    Social Determinants:   None identified.    Consults:   None necessary at this time.    Reassessment:      No new or worsening symptoms on reevaluation.  Vitals stable.    MDM/Disposition Considerations:   Briefly  completed with a voice recognition program.  Efforts were made to edit the dictations but occasionally words are mis-transcribed).          Jas Welsh PA  10/29/24 0124

## 2024-10-30 NOTE — PROGRESS NOTES
34 y.o.  at 32w4d EGA Estimated Date of Delivery: 24 here for VITO:     Pt seen and examined. Follows with Dr. Татьяна MD  Does admit to right sided calf pain and tightness. No redness. } see plan below.   She denies SOB, CP, dizziness, etc...   Denies VB, LOF, CTX. Endorses (+) FM.   Denies fevers / chills   Denies HA, changes with vision, RUQ pain, edema.   MWQ reviewed.     Objective:  /74 (Site: Right Upper Arm, Position: Sitting, Cuff Size: Medium Adult)   Pulse 84   Wt 93.4 kg (205 lb 12.8 oz)   LMP 2023 Comment: missed AB 10/25/23  BMI 34.25 kg/m²   Gen: AO, NAD  Abd: Soft, NT, gravid   Ext: Mild LE edema. 41 vs 42 cm calf size, (-) homans sign   OMM: Increased lumbar lordosis    Images:   OBSTETRICAL ULTRASOUND GROWTH     DATE: 10/28/2024     PHYSICIAN: LIGIA Austin D.O.      SONOGRAPHER:  MILVIA CHAVIS(S)     INDICATION: Growth, BPP8/8     TYPE OF SCAN: abdominal     FINDINGS:  A single viable intrauterine pregnancy is noted in Breech presentation. Cardiac and somatic activity are noted.     The following values were obtained:              Fetal heart rate                                               132 bpm              BPD                                                                 7.92cm                        20.6 %              Head Circumference                                       29.54cm                      16.2 %               Abdominal Circumference                              28.8cm                        57.3 %              Femur Length                                                  6.01cm                        10.2 %              Amniotic fluid index                                         13.13cm              EFW                                                                1934g              30.7 percentile     Amniotic fluid volume is 13.13. Based on sonographic criteria the estimated fetal age is 32 weeks and 1 days with EDC of 2024. There is a

## 2024-10-31 ENCOUNTER — ROUTINE PRENATAL (OUTPATIENT)
Dept: OBGYN CLINIC | Age: 34
End: 2024-10-31

## 2024-10-31 VITALS
HEART RATE: 79 BPM | DIASTOLIC BLOOD PRESSURE: 78 MMHG | OXYGEN SATURATION: 98 % | SYSTOLIC BLOOD PRESSURE: 112 MMHG | HEIGHT: 65 IN | WEIGHT: 205.8 LBS | BODY MASS INDEX: 34.29 KG/M2 | TEMPERATURE: 97.9 F

## 2024-10-31 DIAGNOSIS — O44.40 LOW-LYING PLACENTA: ICD-10-CM

## 2024-10-31 DIAGNOSIS — O09.299 HISTORY OF FETAL ANOMALY IN PRIOR PREGNANCY, CURRENTLY PREGNANT: ICD-10-CM

## 2024-10-31 DIAGNOSIS — O24.414 INSULIN CONTROLLED GESTATIONAL DIABETES MELLITUS (GDM) IN THIRD TRIMESTER: ICD-10-CM

## 2024-10-31 DIAGNOSIS — Z34.93 PRENATAL CARE IN THIRD TRIMESTER: Primary | ICD-10-CM

## 2024-10-31 DIAGNOSIS — O02.1 MISSED ABORTION WITH FETAL DEMISE BEFORE 20 COMPLETED WEEKS OF GESTATION: ICD-10-CM

## 2024-10-31 DIAGNOSIS — R87.810 ATYPICAL SQUAMOUS CELL CHANGES OF UNDETERMINED SIGNIFICANCE (ASCUS) ON CERVICAL CYTOLOGY WITH POSITIVE HIGH RISK HUMAN PAPILLOMA VIRUS (HPV): ICD-10-CM

## 2024-10-31 DIAGNOSIS — R87.610 ATYPICAL SQUAMOUS CELL CHANGES OF UNDETERMINED SIGNIFICANCE (ASCUS) ON CERVICAL CYTOLOGY WITH POSITIVE HIGH RISK HUMAN PAPILLOMA VIRUS (HPV): ICD-10-CM

## 2024-10-31 NOTE — PROGRESS NOTES
34 y.o.  at 33w0d EGA Estimated Date of Delivery: 24 here for VITO:     Pt seen and examined. No concerns/complaints.  She reports she is feeling much better at this time. She was anxious about her calf tenderness, her dopplers came back negative.     Denies VB, LOF, CTX. Endorses (+) FM. Denies fevers / chills / chest pain / shortness of breath.   Denies HA, changes with vision, RUQ pain, edema.     Her fingersticks were reviewed. Two fasting since last visit were elevated as well as her two postprandial after  dinner     Objective:  /78   Pulse 79   Temp 97.9 °F (36.6 °C) (Temporal)   Ht 1.651 m (5' 5\")   Wt 93.4 kg (205 lb 12.8 oz)   LMP 2023 Comment: missed AB 10/25/23  SpO2 98%   BMI 34.25 kg/m²   Gen: AO, NAD  Abd: Soft, NT, gravid   Ext: Mild LE edema    Assessment/Plan:   Diagnosis Orders   1. Prenatal care in third trimester        2. Insulin controlled gestational diabetes mellitus (GDM) in third trimester        3. Missed  with fetal demise before 20 completed weeks of gestation        4. Low-lying placenta (RESOLVED)        5. History of fetal anomaly in prior pregnancy, currently pregnant        6. Atypical squamous cell changes of undetermined significance (ASCUS) on cervical cytology with positive high risk human papilloma virus (HPV)           - increased to nph 10 units at night   -reactive nst   - reassuring maternal / fetal status     Follow Up  Return OB precautions reviewed   No follow-ups on file.    Approximately 10 minutes spent in room counseling patient on condition and coordination of care with over 50% in direct face to face counseling.     Tea Lopes DO

## 2024-11-01 RX ORDER — BLOOD SUGAR DIAGNOSTIC
STRIP MISCELLANEOUS
Qty: 50 STRIP | Refills: 3 | Status: SHIPPED | OUTPATIENT
Start: 2024-11-01

## 2024-11-01 RX ORDER — LANCETS 30 GAUGE
EACH MISCELLANEOUS
Qty: 100 EACH | Refills: 2 | Status: SHIPPED | OUTPATIENT
Start: 2024-11-01

## 2024-11-04 ENCOUNTER — ROUTINE PRENATAL (OUTPATIENT)
Dept: OBGYN CLINIC | Age: 34
End: 2024-11-04

## 2024-11-04 ENCOUNTER — TELEPHONE (OUTPATIENT)
Dept: OBGYN CLINIC | Age: 34
End: 2024-11-04

## 2024-11-04 VITALS
SYSTOLIC BLOOD PRESSURE: 108 MMHG | DIASTOLIC BLOOD PRESSURE: 68 MMHG | WEIGHT: 207.4 LBS | BODY MASS INDEX: 34.51 KG/M2 | HEART RATE: 77 BPM

## 2024-11-04 DIAGNOSIS — R87.810 ATYPICAL SQUAMOUS CELL CHANGES OF UNDETERMINED SIGNIFICANCE (ASCUS) ON CERVICAL CYTOLOGY WITH POSITIVE HIGH RISK HUMAN PAPILLOMA VIRUS (HPV): ICD-10-CM

## 2024-11-04 DIAGNOSIS — O02.1 MISSED ABORTION WITH FETAL DEMISE BEFORE 20 COMPLETED WEEKS OF GESTATION: ICD-10-CM

## 2024-11-04 DIAGNOSIS — Z34.93 PRENATAL CARE IN THIRD TRIMESTER: Primary | ICD-10-CM

## 2024-11-04 DIAGNOSIS — R87.610 ATYPICAL SQUAMOUS CELL CHANGES OF UNDETERMINED SIGNIFICANCE (ASCUS) ON CERVICAL CYTOLOGY WITH POSITIVE HIGH RISK HUMAN PAPILLOMA VIRUS (HPV): ICD-10-CM

## 2024-11-04 DIAGNOSIS — O24.414 INSULIN CONTROLLED GESTATIONAL DIABETES MELLITUS (GDM) IN THIRD TRIMESTER: ICD-10-CM

## 2024-11-04 DIAGNOSIS — O09.299 HISTORY OF FETAL ANOMALY IN PRIOR PREGNANCY, CURRENTLY PREGNANT: ICD-10-CM

## 2024-11-04 PROCEDURE — 0502F SUBSEQUENT PRENATAL CARE: CPT | Performed by: OBSTETRICS & GYNECOLOGY

## 2024-11-04 NOTE — TELEPHONE ENCOUNTER
This is a Jeira patient   Forwarding as FYI   If she has to have an NST on 11/25 then we can do a BPP in triage on 11/28   Please review situation with the patient    ALBRIANNA

## 2024-11-05 NOTE — TELEPHONE ENCOUNTER
Called and spoke to Tessy on 11/5/24.  Patient is set to have bpp at hospital on 11/28/24 at 11 am.

## 2024-11-07 ENCOUNTER — ROUTINE PRENATAL (OUTPATIENT)
Dept: OBGYN CLINIC | Age: 34
End: 2024-11-07
Payer: COMMERCIAL

## 2024-11-07 VITALS
DIASTOLIC BLOOD PRESSURE: 70 MMHG | BODY MASS INDEX: 34.69 KG/M2 | TEMPERATURE: 98.7 F | SYSTOLIC BLOOD PRESSURE: 102 MMHG | WEIGHT: 208.2 LBS | HEART RATE: 85 BPM | HEIGHT: 65 IN | OXYGEN SATURATION: 98 %

## 2024-11-07 DIAGNOSIS — O24.414 INSULIN CONTROLLED GESTATIONAL DIABETES MELLITUS (GDM) IN THIRD TRIMESTER: ICD-10-CM

## 2024-11-07 DIAGNOSIS — O44.40 LOW-LYING PLACENTA: ICD-10-CM

## 2024-11-07 DIAGNOSIS — O02.1 MISSED ABORTION WITH FETAL DEMISE BEFORE 20 COMPLETED WEEKS OF GESTATION: ICD-10-CM

## 2024-11-07 DIAGNOSIS — Z34.93 PRENATAL CARE IN THIRD TRIMESTER: Primary | ICD-10-CM

## 2024-11-07 DIAGNOSIS — R87.610 ATYPICAL SQUAMOUS CELL CHANGES OF UNDETERMINED SIGNIFICANCE (ASCUS) ON CERVICAL CYTOLOGY WITH POSITIVE HIGH RISK HUMAN PAPILLOMA VIRUS (HPV): ICD-10-CM

## 2024-11-07 DIAGNOSIS — O09.299 HISTORY OF FETAL ANOMALY IN PRIOR PREGNANCY, CURRENTLY PREGNANT: ICD-10-CM

## 2024-11-07 DIAGNOSIS — R87.810 ATYPICAL SQUAMOUS CELL CHANGES OF UNDETERMINED SIGNIFICANCE (ASCUS) ON CERVICAL CYTOLOGY WITH POSITIVE HIGH RISK HUMAN PAPILLOMA VIRUS (HPV): ICD-10-CM

## 2024-11-07 PROCEDURE — 0502F SUBSEQUENT PRENATAL CARE: CPT | Performed by: OBSTETRICS & GYNECOLOGY

## 2024-11-07 PROCEDURE — 59025 FETAL NON-STRESS TEST: CPT | Performed by: OBSTETRICS & GYNECOLOGY

## 2024-11-07 NOTE — PROGRESS NOTES
Return OB Office Visit    CC:   Chief Complaint   Patient presents with    Routine Prenatal Visit       HPI:  Pt seen and examined. No concerns/complaints. Denies VB, LOF, ctx. +FM.    BG reviewed, normal.    Objective:  /70   Pulse 85   Temp 98.7 °F (37.1 °C) (Temporal)   Ht 1.651 m (5' 5\")   Wt 94.4 kg (208 lb 3.2 oz)   LMP 2023 Comment: missed AB 10/25/23  SpO2 98%   BMI 34.65 kg/m²   Gen: AO, NAD  Abd: Soft, NT  FHT: 135    Assessment/Plan:  34 y.o.  at 34w0d (Estimated Date of Delivery: 24) presents for VITO appointment:     Problem List Items Addressed This Visit       Atypical squamous cell changes of undetermined significance (ASCUS) on cervical cytology with positive high risk human papilloma virus (HPV)    Missed  with fetal demise before 20 completed weeks of gestation     - h/o pregnancy loss at 16 weeks  - indeterminant APLAS work-up, saw MFM, would not recommend anticoagulation   - continue baby aspirin through pregnancy          Prenatal care in third trimester - Primary     - FWB: reassuring by roman today  - Genetic screening: MQS low risk  - Anatomy scan: early anatomy with MFM 24 (see below), 2nd trimester anatomy scheduled 24    - MFM 24: 221g, nl anatomy (suboptimal 4ch, AA), choriod plexus cyst noted, posterior low-lying placenta, normal fluid, CL 3.61cm    - US 24: 377g (27%ile), resolved CP cyst, resolved low-lying placenta  - Flu shot: 24  - Tdap: 24  - RSV: 32-36 weeks  - PNL: O+/ab-, RI, HepB/C neg, RPRnr, HIV neg, VZV immune, Hgb 13.9, UDS neg, GCCT neg, Ucx neg    - 1hr and 3hr GTT abnormal (see below), Hgb 12.8          History of fetal anomaly in prior pregnancy, currently pregnant     - h/o elevated AFP in G1, noted to have fetal demise at 16 weeks with gastrochisis  - on folic acid  - early MFM consult placed, they recommend early anatomy -- completed 24: 86g, overall normal early anatomy without evidence of

## 2024-11-07 NOTE — ASSESSMENT & PLAN NOTE
BG logs reviewed  - has seen diabetes educator  - NPH 10 units at bedtime  - Continue 2x/week ANFS and q4wk growth US  - Delivery at 39 weeks or sooner as indicated

## 2024-11-11 ENCOUNTER — ROUTINE PRENATAL (OUTPATIENT)
Dept: OBGYN CLINIC | Age: 34
End: 2024-11-11

## 2024-11-11 VITALS
BODY MASS INDEX: 34.68 KG/M2 | HEART RATE: 93 BPM | SYSTOLIC BLOOD PRESSURE: 109 MMHG | DIASTOLIC BLOOD PRESSURE: 76 MMHG | WEIGHT: 208.4 LBS

## 2024-11-11 DIAGNOSIS — Z34.93 PRENATAL CARE IN THIRD TRIMESTER: Primary | ICD-10-CM

## 2024-11-11 DIAGNOSIS — R87.610 ATYPICAL SQUAMOUS CELL CHANGES OF UNDETERMINED SIGNIFICANCE (ASCUS) ON CERVICAL CYTOLOGY WITH POSITIVE HIGH RISK HUMAN PAPILLOMA VIRUS (HPV): ICD-10-CM

## 2024-11-11 DIAGNOSIS — O24.414 INSULIN CONTROLLED GESTATIONAL DIABETES MELLITUS (GDM) IN THIRD TRIMESTER: ICD-10-CM

## 2024-11-11 DIAGNOSIS — R87.810 ATYPICAL SQUAMOUS CELL CHANGES OF UNDETERMINED SIGNIFICANCE (ASCUS) ON CERVICAL CYTOLOGY WITH POSITIVE HIGH RISK HUMAN PAPILLOMA VIRUS (HPV): ICD-10-CM

## 2024-11-11 DIAGNOSIS — O09.299 HISTORY OF FETAL ANOMALY IN PRIOR PREGNANCY, CURRENTLY PREGNANT: ICD-10-CM

## 2024-11-11 DIAGNOSIS — O44.40 LOW-LYING PLACENTA: ICD-10-CM

## 2024-11-11 NOTE — PROGRESS NOTES
33 y/o  female at 33 weeks 4 days gestation with EDC 24 presents for prenatal visit and fetal monitoring.   Patient is established with Dr. Vaz.   Pregnancy is complicated by breech presentation, GDMA2, missed AB x 2, low lying placenta (resolved early 2nd trimester), history of fetal anomaly in prior pregnancy, and ASCUS with positive high risk HPV.   Glucose levels:   Fasting 79-99 (2 values at 99, remainder < 95)  1 hour postprandial:  (2 values > 140, 162, 176)  Utilizes 10 units NPH in the evening.  Denies vaginal bleeding, loss of fluid, contractions and decreased fetal movement.   Denies headaches, vision changes and RUQ pain.   Admits to baseline shortness of breath and constipation.   Denies fever, chills, chest pain, diarrhea, dysuria and hematuria.   Maternal Wellness Questionnaire reviewed--no concerns.    OB ULTRASOUND:  BIOPHYSICAL PROFILE     DATE: 2024     PHYSICIAN: BUNNY Mayers D.O.     SONOGRAPHER:  MILVIA CHAVIS(S)     INDICATION: Fetal well being     TYPE OF SCAN: abdominal     BPP: 8/8  Tone: 2, Gross fetal movement: 2, Breathin, Fluid: 2     FINDINGS:  A single viable intrauterine pregnancy is noted in Breech presentation. Cardiac and somatic activity are noted.      The following values were obtained:              Fetal heart rate 138 bpm              Amniotic fluid index 13.49cm     IMPRESSION:   Single live IUP in the 3rd trimester. BPP 8/8. POORNIMA 13.49cm.     Imaging is limited secondary to maternal body habitus.  The patient is well aware of the limitations of ultrasound in the detection of anomalies.          Diagnosis Orders   1. Prenatal care in third trimester        2. Breech presentation, single or unspecified fetus        3. Missed  with fetal demise before 20 completed weeks of gestation        4. History of fetal anomaly in prior pregnancy, currently pregnant        5. Insulin controlled gestational diabetes mellitus (GDM) in third trimester

## 2024-11-14 ENCOUNTER — ROUTINE PRENATAL (OUTPATIENT)
Dept: OBGYN CLINIC | Age: 34
End: 2024-11-14
Payer: COMMERCIAL

## 2024-11-14 VITALS
BODY MASS INDEX: 34.66 KG/M2 | TEMPERATURE: 97.9 F | OXYGEN SATURATION: 98 % | HEIGHT: 65 IN | HEART RATE: 76 BPM | SYSTOLIC BLOOD PRESSURE: 108 MMHG | DIASTOLIC BLOOD PRESSURE: 62 MMHG | WEIGHT: 208 LBS

## 2024-11-14 DIAGNOSIS — O44.40 LOW-LYING PLACENTA: ICD-10-CM

## 2024-11-14 DIAGNOSIS — Z29.11 NEED FOR RSV IMMUNIZATION: Primary | ICD-10-CM

## 2024-11-14 DIAGNOSIS — O02.1 MISSED ABORTION WITH FETAL DEMISE BEFORE 20 COMPLETED WEEKS OF GESTATION: ICD-10-CM

## 2024-11-14 DIAGNOSIS — Z34.93 PRENATAL CARE IN THIRD TRIMESTER: ICD-10-CM

## 2024-11-14 DIAGNOSIS — O24.414 INSULIN CONTROLLED GESTATIONAL DIABETES MELLITUS (GDM) IN THIRD TRIMESTER: ICD-10-CM

## 2024-11-14 DIAGNOSIS — O09.299 HISTORY OF FETAL ANOMALY IN PRIOR PREGNANCY, CURRENTLY PREGNANT: ICD-10-CM

## 2024-11-14 PROCEDURE — 59025 FETAL NON-STRESS TEST: CPT | Performed by: OBSTETRICS & GYNECOLOGY

## 2024-11-14 PROCEDURE — 0502F SUBSEQUENT PRENATAL CARE: CPT | Performed by: OBSTETRICS & GYNECOLOGY

## 2024-11-14 NOTE — ASSESSMENT & PLAN NOTE
[facility]  - scheduled for ECV with CD to follow if unsuccessful on 12/14/24 at 0800

## 2024-11-14 NOTE — PROGRESS NOTES
4:33 PM Given RSV vaccine vaccine 0.5mL IM  Site:Left deltoid.   Lot # [id number]  Expiration Date:  09/2025  NDC # 7892-7255-20 .  Patient tolerated well. No reaction noted after 20 minutes. VIS sheet provided.  Administered by: Chloe Koeppe, LPN    
placenta (RESOLVED)    Insulin controlled gestational diabetes mellitus (GDM) in third trimester     BG logs reviewed  - has seen diabetes educator  - NPH 10 units at bedtime  - Continue 2x/week ANFS and q4wk growth US  - Delivery at 39 weeks or sooner as indicated          Relevant Orders    FETAL NON-STRESS TEST (Completed)    Fetal malpresentation     Breech, will continue to monitor/check  - scheduled for ECV with CD to follow if unsuccessful on 12/14/24 at 0800           Other Visit Diagnoses       Need for RSV immunization    -  Primary    Relevant Medications    respiratory syncytial vaccine (ABRYSVO) injection 0.5 mL (Start on 11/14/2024  4:45 PM)            Dispo: RTC in 3-4 days  Key Vaz MD

## 2024-11-14 NOTE — ASSESSMENT & PLAN NOTE
- h/o elevated AFP in G1, noted to have fetal demise at 16 weeks with gastrochisis  - on folic acid  - early MFM consult placed, they recommend early anatomy -- completed 6/20/24: 86g, overall normal early anatomy without evidence of abdominal wall defects, posterior placenta  - MQS/AFP low risk  - plan q4wk growth US   - US 9/27/24: 1272g (59%ile)   - US 10/30/24: 1934g (30%ile)

## 2024-11-18 ENCOUNTER — ROUTINE PRENATAL (OUTPATIENT)
Dept: OBGYN CLINIC | Age: 34
End: 2024-11-18
Payer: COMMERCIAL

## 2024-11-18 DIAGNOSIS — O24.414 INSULIN CONTROLLED GESTATIONAL DIABETES MELLITUS (GDM) IN THIRD TRIMESTER: ICD-10-CM

## 2024-11-18 DIAGNOSIS — R87.810 ATYPICAL SQUAMOUS CELL CHANGES OF UNDETERMINED SIGNIFICANCE (ASCUS) ON CERVICAL CYTOLOGY WITH POSITIVE HIGH RISK HUMAN PAPILLOMA VIRUS (HPV): ICD-10-CM

## 2024-11-18 DIAGNOSIS — O09.299 HISTORY OF FETAL ANOMALY IN PRIOR PREGNANCY, CURRENTLY PREGNANT: ICD-10-CM

## 2024-11-18 DIAGNOSIS — R87.610 ATYPICAL SQUAMOUS CELL CHANGES OF UNDETERMINED SIGNIFICANCE (ASCUS) ON CERVICAL CYTOLOGY WITH POSITIVE HIGH RISK HUMAN PAPILLOMA VIRUS (HPV): ICD-10-CM

## 2024-11-18 DIAGNOSIS — O44.40 LOW-LYING PLACENTA: ICD-10-CM

## 2024-11-18 DIAGNOSIS — Z34.93 PRENATAL CARE IN THIRD TRIMESTER: Primary | ICD-10-CM

## 2024-11-18 DIAGNOSIS — O02.1 MISSED ABORTION WITH FETAL DEMISE BEFORE 20 COMPLETED WEEKS OF GESTATION: ICD-10-CM

## 2024-11-18 PROCEDURE — 0502F SUBSEQUENT PRENATAL CARE: CPT | Performed by: OBSTETRICS & GYNECOLOGY

## 2024-11-18 PROCEDURE — 59025 FETAL NON-STRESS TEST: CPT | Performed by: OBSTETRICS & GYNECOLOGY

## 2024-11-18 NOTE — PROGRESS NOTES
34 y.o.  at 35w4d EGA Estimated Date of Delivery: 24 here for VITO:     Pt seen and examined. No concerns/complaints.  Pt follows with Dr. Vaz.   ]Reviewed BS logs,  abnormal -- recommend adding 2 NPH to am + 12 NPH already using PM, logs scanned in   Otherwise feels well.   Denies VB, LOF, CTX. Endorses (+) FM. Denies fevers / chills / chest pain / shortness of breath.   Denies HA, changes with vision, RUQ pain, edema.   MWQ reviewed.     Objective:  LMP 2023 Comment: missed AB 10/25/23  Gen: AO, NAD  Abd: Soft, NT, gravid   Ext: Mild LE edema  OMM: Increased lumbar lordosis    NST:  Reactive Cat 1     Assessment/Plan:   Diagnosis Orders   1. Prenatal care in third trimester  FETAL NON-STRESS TEST      2. Insulin controlled gestational diabetes mellitus (GDM) in third trimester        3. Missed  with fetal demise before 20 completed weeks of gestation        4. Low-lying placenta (RESOLVED)        5. History of fetal anomaly in prior pregnancy, currently pregnant        6. Breech presentation, single or unspecified fetus        7. Atypical squamous cell changes of undetermined significance (ASCUS) on cervical cytology with positive high risk human papilloma virus (HPV)           - reassuring maternal / fetal status by NST today      Missed  with fetal demise before 20 completed weeks of gestation       - h/o pregnancy loss at 16 weeks  - indeterminant APLAS work-up, saw MFM, would not recommend anticoagulation   - continue baby aspirin through pregnancy            Prenatal Care in Third trimester        - FWB: reassuring by roman today  - Genetic screening: MQS low risk  - Anatomy scan: early anatomy with MFM 24 (see below), 2nd trimester anatomy scheduled 24    - MFM 24: 221g, nl anatomy (suboptimal 4ch, AA), choriod plexus cyst noted, posterior low-lying placenta, normal fluid, CL 3.61cm    - US 24: 377g (27%ile), resolved CP cyst, resolved low-lying

## 2024-11-21 ENCOUNTER — ROUTINE PRENATAL (OUTPATIENT)
Dept: OBGYN CLINIC | Age: 34
End: 2024-11-21

## 2024-11-21 VITALS
DIASTOLIC BLOOD PRESSURE: 82 MMHG | WEIGHT: 209.2 LBS | HEART RATE: 93 BPM | SYSTOLIC BLOOD PRESSURE: 124 MMHG | OXYGEN SATURATION: 99 % | TEMPERATURE: 98 F | HEIGHT: 65 IN | BODY MASS INDEX: 34.85 KG/M2

## 2024-11-21 DIAGNOSIS — O09.299 HISTORY OF FETAL ANOMALY IN PRIOR PREGNANCY, CURRENTLY PREGNANT: ICD-10-CM

## 2024-11-21 DIAGNOSIS — Z34.93 PRENATAL CARE IN THIRD TRIMESTER: Primary | ICD-10-CM

## 2024-11-21 DIAGNOSIS — O24.414 INSULIN CONTROLLED GESTATIONAL DIABETES MELLITUS (GDM) IN THIRD TRIMESTER: ICD-10-CM

## 2024-11-21 DIAGNOSIS — O02.1 MISSED ABORTION WITH FETAL DEMISE BEFORE 20 COMPLETED WEEKS OF GESTATION: ICD-10-CM

## 2024-11-21 DIAGNOSIS — O44.40 LOW-LYING PLACENTA: ICD-10-CM

## 2024-11-21 PROCEDURE — 0502F SUBSEQUENT PRENATAL CARE: CPT | Performed by: OBSTETRICS & GYNECOLOGY

## 2024-11-21 NOTE — PROGRESS NOTES
Return OB Office Visit    CC:   Chief Complaint   Patient presents with    Routine Prenatal Visit    Pregnancy Ultrasound       HPI:  Pt seen and examined. No concerns/complaints. Denies VB, LOF, ctx. +FM.     Objective:  /82   Pulse 93   Temp 98 °F (36.7 °C) (Temporal)   Ht 1.651 m (5' 5\")   Wt 94.9 kg (209 lb 3.2 oz)   LMP 2023 Comment: missed AB 10/25/23  SpO2 99%   BMI 34.81 kg/m²   Gen: AO, NAD  Abd: Soft, NT  FHT: 156    Assessment/Plan:  34 y.o.  at 36w0d (Estimated Date of Delivery: 24) presents for VITO appointment:     Problem List Items Addressed This Visit       Missed  with fetal demise before 20 completed weeks of gestation     - h/o pregnancy loss at 16 weeks  - indeterminant APLAS work-up, saw MFM, would not recommend anticoagulation   - continue baby aspirin through pregnancy          Prenatal care in third trimester - Primary     - FWB: reassuring by roman today  - Genetic screening: [facility] [place] low risk  - Anatomy scan: early anatomy with MFM 24 (see below), 2nd trimester anatomy scheduled 24    - MFM 24: 221g, nl anatomy (suboptimal 4ch, AA), choriod plexus cyst noted, posterior low-lying placenta, normal fluid, CL 3.61cm    - US 24: 377g (27%ile), resolved CP cyst, resolved low-lying placenta  - Flu shot: 24  - Tdap: 24  - RSV: 24  - PNL: O+/ab-, RI, HepB/C neg, RPRnr, HIV neg, VZV immune, Hgb 13.9, UDS neg, GCCT neg, Ucx neg    - 1hr and 3hr GTT abnormal (see below), Hgb 12.8    - GBS sent today          Relevant Orders    Culture, Strep B Screen, Vaginal/Rectal    History of fetal anomaly in prior pregnancy, currently pregnant     - h/o elevated AFP in G1, noted to have fetal demise at 16 weeks with gastrochisis  - on folic acid  - early MFM consult placed, they recommend early anatomy -- completed 24: 86g, overall normal early anatomy without evidence of abdominal wall defects, posterior placenta  - MQS/AFP low risk  -

## 2024-11-21 NOTE — ASSESSMENT & PLAN NOTE
- FWB: reassuring by doptonelizabeth today  - Genetic screening: MQS low risk  - Anatomy scan: early anatomy with MFM 6/20/24 (see below), 2nd trimester anatomy scheduled 7/18/24    - MFM 7/18/24: 221g, nl anatomy (suboptimal 4ch, AA), choriod plexus cyst noted, posterior low-lying placenta, normal fluid, CL 3.61cm    - US 8/9/24: 377g (27%ile), resolved CP cyst, resolved low-lying placenta  - Flu shot: 9/27/24  - Tdap: 9/27/24  - RSV: 11/14/24  - PNL: O+/ab-, RI, HepB/C neg, RPRnr, HIV neg, VZV immune, Hgb 13.9, UDS neg, GCCT neg, Ucx neg    - 1hr and 3hr GTT abnormal (see below), Hgb 12.8    - GBS sent today

## 2024-11-21 NOTE — ASSESSMENT & PLAN NOTE
[facility], will continue to monitor/check  - scheduled for ECV with CD to follow if unsuccessful on 12/14/24 at 0800

## 2024-11-21 NOTE — ASSESSMENT & PLAN NOTE
- h/o elevated AFP in G1, noted to have fetal demise at 16 weeks with gastrochisis  - on folic acid  - early MFM consult placed, they recommend early anatomy -- completed 6/20/24: 86g, overall normal early anatomy without evidence of abdominal wall defects, posterior placenta  - MQS/AFP low risk  - plan q4wk growth US   - US 9/27/24: 1272g (59%ile)   - US 10/30/24: 1934g (30%ile)   - US 11/21/24: 2841g (53%ile)

## 2024-11-21 NOTE — ASSESSMENT & PLAN NOTE
BG logs reviewed  - has seen diabetes educator  - NPH 10 units at bedtime  - Continue 2x/week ANFS and q4wk growth US  - Delivery at 39 weeks or sooner as indicated    - ECV vs PLTCD for breech scheduled 12/14/24

## 2024-11-24 LAB — GP B STREP SPEC QL CULT: NORMAL

## 2024-11-25 ENCOUNTER — ROUTINE PRENATAL (OUTPATIENT)
Dept: OBGYN CLINIC | Age: 34
End: 2024-11-25
Payer: COMMERCIAL

## 2024-11-25 VITALS
HEART RATE: 98 BPM | BODY MASS INDEX: 34.75 KG/M2 | SYSTOLIC BLOOD PRESSURE: 110 MMHG | WEIGHT: 208.8 LBS | DIASTOLIC BLOOD PRESSURE: 76 MMHG

## 2024-11-25 DIAGNOSIS — R87.610 ATYPICAL SQUAMOUS CELL CHANGES OF UNDETERMINED SIGNIFICANCE (ASCUS) ON CERVICAL CYTOLOGY WITH POSITIVE HIGH RISK HUMAN PAPILLOMA VIRUS (HPV): ICD-10-CM

## 2024-11-25 DIAGNOSIS — Z34.93 PRENATAL CARE IN THIRD TRIMESTER: Primary | ICD-10-CM

## 2024-11-25 DIAGNOSIS — O24.414 INSULIN CONTROLLED GESTATIONAL DIABETES MELLITUS (GDM) IN THIRD TRIMESTER: ICD-10-CM

## 2024-11-25 DIAGNOSIS — O02.1 MISSED ABORTION WITH FETAL DEMISE BEFORE 20 COMPLETED WEEKS OF GESTATION: ICD-10-CM

## 2024-11-25 DIAGNOSIS — R87.810 ATYPICAL SQUAMOUS CELL CHANGES OF UNDETERMINED SIGNIFICANCE (ASCUS) ON CERVICAL CYTOLOGY WITH POSITIVE HIGH RISK HUMAN PAPILLOMA VIRUS (HPV): ICD-10-CM

## 2024-11-25 DIAGNOSIS — O09.299 HISTORY OF FETAL ANOMALY IN PRIOR PREGNANCY, CURRENTLY PREGNANT: ICD-10-CM

## 2024-11-25 DIAGNOSIS — O44.40 LOW-LYING PLACENTA: ICD-10-CM

## 2024-11-25 LAB — GP B STREP SPEC QL CULT: NORMAL

## 2024-11-25 PROCEDURE — 0502F SUBSEQUENT PRENATAL CARE: CPT | Performed by: OBSTETRICS & GYNECOLOGY

## 2024-11-25 PROCEDURE — 59025 FETAL NON-STRESS TEST: CPT | Performed by: OBSTETRICS & GYNECOLOGY

## 2024-11-25 RX ORDER — INSULIN HUMAN 100 [IU]/ML
18 INJECTION, SUSPENSION SUBCUTANEOUS DAILY
Qty: 5 ADJUSTABLE DOSE PRE-FILLED PEN SYRINGE | Refills: 1 | Status: SHIPPED | OUTPATIENT
Start: 2024-11-25

## 2024-11-25 NOTE — PROGRESS NOTES
anatomy scheduled 24    - MFM 24: 221g, nl anatomy (suboptimal 4ch, AA), choriod plexus cyst noted, posterior low-lying placenta, normal fluid, CL 3.61cm    - US 24: 377g (27%ile), resolved CP cyst, resolved low-lying placenta  - Flu shot: 24  - Tdap: 24  - RSV: 24  - PNL: O+/ab-, RI, HepB/C neg, RPRnr, HIV neg, VZV immune, Hgb 13.9, UDS neg, GCCT neg, Ucx neg    - 1hr and 3hr GTT abnormal (see below), Hgb 12.8    - GBS negative            Hx of fetal demise        - h/o elevated AFP in G1, noted to have fetal demise at 16 weeks with gastrochisis  - on folic acid  - early MFM consult placed, they recommend early anatomy -- completed 24: 86g, overall normal early anatomy without evidence of abdominal wall defects, posterior placenta  - MQS/AFP low risk  - plan q4wk growth US   - US 24: 1272g (59%ile)   - US 10/30/24: 1934g (30%ile)             Growth due around 24 if applicable                 GDMA2        BG logs reviewed  - has seen diabetes educator  - NPH 12 units at bedtime  - BS reviewed } doing well NPH 2 units / 12 units   - has a FU on Thursday for NST, will recheck logs then as well   - Continue 2x/week ANFS and q4wk growth US  - Delivery at 39 weeks or sooner as indicated                      Breech Position        Breech, will continue to monitor/check  - recheck position on Thursday with US   - scheduled for ECV with CD to follow if unsuccessful on 24 at 0800             Follow Up  Return OB precautions reviewed   Return in about 3 days (around 2024) for Return OB visit,  Testing.    Toyin Austin DO

## 2024-12-02 ENCOUNTER — ROUTINE PRENATAL (OUTPATIENT)
Dept: OBGYN CLINIC | Age: 34
End: 2024-12-02

## 2024-12-02 VITALS
DIASTOLIC BLOOD PRESSURE: 70 MMHG | BODY MASS INDEX: 34.85 KG/M2 | SYSTOLIC BLOOD PRESSURE: 118 MMHG | WEIGHT: 209.4 LBS | HEART RATE: 74 BPM

## 2024-12-02 DIAGNOSIS — Z34.93 PRENATAL CARE IN THIRD TRIMESTER: Primary | ICD-10-CM

## 2024-12-02 DIAGNOSIS — O02.1 MISSED ABORTION WITH FETAL DEMISE BEFORE 20 COMPLETED WEEKS OF GESTATION: ICD-10-CM

## 2024-12-02 DIAGNOSIS — O44.40 LOW-LYING PLACENTA: ICD-10-CM

## 2024-12-02 DIAGNOSIS — R87.810 ATYPICAL SQUAMOUS CELL CHANGES OF UNDETERMINED SIGNIFICANCE (ASCUS) ON CERVICAL CYTOLOGY WITH POSITIVE HIGH RISK HUMAN PAPILLOMA VIRUS (HPV): ICD-10-CM

## 2024-12-02 DIAGNOSIS — R87.610 ATYPICAL SQUAMOUS CELL CHANGES OF UNDETERMINED SIGNIFICANCE (ASCUS) ON CERVICAL CYTOLOGY WITH POSITIVE HIGH RISK HUMAN PAPILLOMA VIRUS (HPV): ICD-10-CM

## 2024-12-02 DIAGNOSIS — O09.299 HISTORY OF FETAL ANOMALY IN PRIOR PREGNANCY, CURRENTLY PREGNANT: ICD-10-CM

## 2024-12-02 DIAGNOSIS — O24.414 INSULIN CONTROLLED GESTATIONAL DIABETES MELLITUS (GDM) IN THIRD TRIMESTER: ICD-10-CM

## 2024-12-02 PROCEDURE — 0502F SUBSEQUENT PRENATAL CARE: CPT | Performed by: OBSTETRICS & GYNECOLOGY

## 2024-12-02 NOTE — PROGRESS NOTES
34 y.o.  at 37w4d EGA Estimated Date of Delivery: 24 here for VITO:     Pt seen and examined. No concerns/complaints.   Pt follows with Dr. Vaz.   Does admit to occasional contractions   Reviewed BS logs, 2 NPH to am + 12 NPH already using PM, logs scanned in } stay on current regimen   Otherwise feels well.   Denies VB, LOF, CTX. Endorses (+) FM. Denies fevers / chills / chest pain / shortness of breath.   Denies HA, changes with vision, RUQ pain, edema.   MWQ reviewed. Admits to some anxiety.     Objective:  /70 (Site: Right Upper Arm, Position: Sitting, Cuff Size: Medium Adult)   Pulse 74   Wt 95 kg (209 lb 6.4 oz)   LMP 2023 Comment: missed AB 10/25/23  BMI 34.85 kg/m²   Gen: AO, NAD  Abd: Soft, NT, gravid   Ext: Mild LE edema  OMM: Increased lumbar lordosis    Images:   OB ULTRASOUND:  BIOPHYSICAL PROFILE     DATE: 2024     PHYSICIAN: LIGIA Austin D.O.     SONOGRAPHER:  MILVIA CHAVIS(S)     INDICATION: Fetal well being     TYPE OF SCAN: abdominal     BPP: 8/8  Tone: 2, Gross fetal movement: 2, Breathin, Fluid: 2     FINDINGS:  A single viable intrauterine pregnancy is noted in Breech presentation. Cardiac and somatic activity are noted.      The following values were obtained:              Fetal heart rate 148 bpm              Amniotic fluid index 11.75 cm     IMPRESSION:   Single live IUP in the 3rd trimester. BPP 8/8. POORNIMA 11.75 cm.       Assessment/Plan:   Diagnosis Orders   1. Prenatal care in third trimester        2. Insulin controlled gestational diabetes mellitus (GDM) in third trimester        3. Missed  with fetal demise before 20 completed weeks of gestation        4. Low-lying placenta (RESOLVED)        5. History of fetal anomaly in prior pregnancy, currently pregnant        6. Breech presentation, single or unspecified fetus        7. Atypical squamous cell changes of undetermined significance (ASCUS) on cervical cytology with positive high risk

## 2024-12-05 ENCOUNTER — ROUTINE PRENATAL (OUTPATIENT)
Dept: OBGYN CLINIC | Age: 34
End: 2024-12-05
Payer: COMMERCIAL

## 2024-12-05 VITALS
SYSTOLIC BLOOD PRESSURE: 110 MMHG | HEIGHT: 65 IN | TEMPERATURE: 98.3 F | DIASTOLIC BLOOD PRESSURE: 74 MMHG | BODY MASS INDEX: 34.92 KG/M2 | WEIGHT: 209.6 LBS | HEART RATE: 72 BPM | OXYGEN SATURATION: 98 %

## 2024-12-05 DIAGNOSIS — R87.810 ATYPICAL SQUAMOUS CELL CHANGES OF UNDETERMINED SIGNIFICANCE (ASCUS) ON CERVICAL CYTOLOGY WITH POSITIVE HIGH RISK HUMAN PAPILLOMA VIRUS (HPV): ICD-10-CM

## 2024-12-05 DIAGNOSIS — O09.299 HISTORY OF FETAL ANOMALY IN PRIOR PREGNANCY, CURRENTLY PREGNANT: ICD-10-CM

## 2024-12-05 DIAGNOSIS — O44.40 LOW-LYING PLACENTA: ICD-10-CM

## 2024-12-05 DIAGNOSIS — O24.414 INSULIN CONTROLLED GESTATIONAL DIABETES MELLITUS (GDM) IN THIRD TRIMESTER: ICD-10-CM

## 2024-12-05 DIAGNOSIS — R87.610 ATYPICAL SQUAMOUS CELL CHANGES OF UNDETERMINED SIGNIFICANCE (ASCUS) ON CERVICAL CYTOLOGY WITH POSITIVE HIGH RISK HUMAN PAPILLOMA VIRUS (HPV): ICD-10-CM

## 2024-12-05 DIAGNOSIS — Z34.93 PRENATAL CARE IN THIRD TRIMESTER: Primary | ICD-10-CM

## 2024-12-05 PROCEDURE — 0502F SUBSEQUENT PRENATAL CARE: CPT | Performed by: OBSTETRICS & GYNECOLOGY

## 2024-12-05 PROCEDURE — 59025 FETAL NON-STRESS TEST: CPT | Performed by: OBSTETRICS & GYNECOLOGY

## 2024-12-09 ENCOUNTER — ROUTINE PRENATAL (OUTPATIENT)
Dept: OBGYN CLINIC | Age: 34
End: 2024-12-09

## 2024-12-09 ENCOUNTER — ANESTHESIA (OUTPATIENT)
Dept: LABOR AND DELIVERY | Age: 34
End: 2024-12-09
Payer: COMMERCIAL

## 2024-12-09 ENCOUNTER — ANESTHESIA EVENT (OUTPATIENT)
Dept: LABOR AND DELIVERY | Age: 34
End: 2024-12-09
Payer: COMMERCIAL

## 2024-12-09 ENCOUNTER — HOSPITAL ENCOUNTER (INPATIENT)
Age: 34
LOS: 3 days | Discharge: HOME | End: 2024-12-12
Attending: OBSTETRICS & GYNECOLOGY | Admitting: OBSTETRICS & GYNECOLOGY
Payer: COMMERCIAL

## 2024-12-09 VITALS
BODY MASS INDEX: 34.41 KG/M2 | SYSTOLIC BLOOD PRESSURE: 110 MMHG | HEART RATE: 71 BPM | WEIGHT: 206.8 LBS | DIASTOLIC BLOOD PRESSURE: 60 MMHG

## 2024-12-09 DIAGNOSIS — Z34.93 PRENATAL CARE IN THIRD TRIMESTER: Primary | ICD-10-CM

## 2024-12-09 DIAGNOSIS — O09.299 HISTORY OF FETAL ANOMALY IN PRIOR PREGNANCY, CURRENTLY PREGNANT: ICD-10-CM

## 2024-12-09 DIAGNOSIS — O02.1 MISSED ABORTION WITH FETAL DEMISE BEFORE 20 COMPLETED WEEKS OF GESTATION: ICD-10-CM

## 2024-12-09 DIAGNOSIS — Z98.891 S/P PRIMARY LOW TRANSVERSE C-SECTION: Primary | ICD-10-CM

## 2024-12-09 DIAGNOSIS — O44.40 LOW-LYING PLACENTA: ICD-10-CM

## 2024-12-09 DIAGNOSIS — O24.414 INSULIN CONTROLLED GESTATIONAL DIABETES MELLITUS (GDM) IN THIRD TRIMESTER: ICD-10-CM

## 2024-12-09 PROBLEM — Z37.9 NORMAL LABOR: Status: ACTIVE | Noted: 2024-12-09

## 2024-12-09 LAB
ABO + RH BLD: NORMAL
AMPHETAMINES UR QL SCN>1000 NG/ML: NORMAL
BARBITURATES UR QL SCN>200 NG/ML: NORMAL
BASOPHILS # BLD: 0 K/UL (ref 0–0.2)
BASOPHILS NFR BLD: 0.4 %
BENZODIAZ UR QL SCN>200 NG/ML: NORMAL
BLD GP AB SCN SERPL QL: NORMAL
BUPRENORPHINE+NOR UR QL SCN: NORMAL
CANNABINOIDS UR QL SCN>50 NG/ML: NORMAL
COCAINE UR QL SCN: NORMAL
DEPRECATED RDW RBC AUTO: 14.3 % (ref 12.4–15.4)
DRUG SCREEN COMMENT UR-IMP: NORMAL
EOSINOPHIL # BLD: 0 K/UL (ref 0–0.6)
EOSINOPHIL NFR BLD: 0.4 %
FENTANYL SCREEN, URINE: NORMAL
HCT VFR BLD AUTO: 38.8 % (ref 36–48)
HGB BLD-MCNC: 12.9 G/DL (ref 12–16)
LYMPHOCYTES # BLD: 1.7 K/UL (ref 1–5.1)
LYMPHOCYTES NFR BLD: 16.1 %
MCH RBC QN AUTO: 29 PG (ref 26–34)
MCHC RBC AUTO-ENTMCNC: 33.1 G/DL (ref 31–36)
MCV RBC AUTO: 87.5 FL (ref 80–100)
METHADONE UR QL SCN>300 NG/ML: NORMAL
MONOCYTES # BLD: 0.9 K/UL (ref 0–1.3)
MONOCYTES NFR BLD: 8.6 %
NEUTROPHILS # BLD: 8 K/UL (ref 1.7–7.7)
NEUTROPHILS NFR BLD: 74.5 %
OPIATES UR QL SCN>300 NG/ML: NORMAL
OXYCODONE UR QL SCN: NORMAL
PCP UR QL SCN>25 NG/ML: NORMAL
PH UR STRIP: 4.5 [PH]
PLATELET # BLD AUTO: 137 K/UL (ref 135–450)
PMV BLD AUTO: 9.2 FL (ref 5–10.5)
RBC # BLD AUTO: 4.44 M/UL (ref 4–5.2)
WBC # BLD AUTO: 10.8 K/UL (ref 4–11)

## 2024-12-09 PROCEDURE — 80307 DRUG TEST PRSMV CHEM ANLYZR: CPT

## 2024-12-09 PROCEDURE — 85025 COMPLETE CBC W/AUTO DIFF WBC: CPT

## 2024-12-09 PROCEDURE — 2580000003 HC RX 258: Performed by: NURSE ANESTHETIST, CERTIFIED REGISTERED

## 2024-12-09 PROCEDURE — 2709999900 HC NON-CHARGEABLE SUPPLY: Performed by: OBSTETRICS & GYNECOLOGY

## 2024-12-09 PROCEDURE — 7100000001 HC PACU RECOVERY - ADDTL 15 MIN: Performed by: OBSTETRICS & GYNECOLOGY

## 2024-12-09 PROCEDURE — 3609079900 HC CESAREAN SECTION: Performed by: OBSTETRICS & GYNECOLOGY

## 2024-12-09 PROCEDURE — 6360000002 HC RX W HCPCS: Performed by: OBSTETRICS & GYNECOLOGY

## 2024-12-09 PROCEDURE — 86900 BLOOD TYPING SEROLOGIC ABO: CPT

## 2024-12-09 PROCEDURE — 1220000000 HC SEMI PRIVATE OB R&B

## 2024-12-09 PROCEDURE — 0502F SUBSEQUENT PRENATAL CARE: CPT | Performed by: OBSTETRICS & GYNECOLOGY

## 2024-12-09 PROCEDURE — 59510 CESAREAN DELIVERY: CPT | Performed by: OBSTETRICS & GYNECOLOGY

## 2024-12-09 PROCEDURE — 3700000000 HC ANESTHESIA ATTENDED CARE: Performed by: OBSTETRICS & GYNECOLOGY

## 2024-12-09 PROCEDURE — 2580000003 HC RX 258: Performed by: OBSTETRICS & GYNECOLOGY

## 2024-12-09 PROCEDURE — 3700000001 HC ADD 15 MINUTES (ANESTHESIA): Performed by: OBSTETRICS & GYNECOLOGY

## 2024-12-09 PROCEDURE — 86780 TREPONEMA PALLIDUM: CPT

## 2024-12-09 PROCEDURE — 86850 RBC ANTIBODY SCREEN: CPT

## 2024-12-09 PROCEDURE — 7100000000 HC PACU RECOVERY - FIRST 15 MIN: Performed by: OBSTETRICS & GYNECOLOGY

## 2024-12-09 PROCEDURE — 86901 BLOOD TYPING SEROLOGIC RH(D): CPT

## 2024-12-09 PROCEDURE — 6360000002 HC RX W HCPCS: Performed by: NURSE ANESTHETIST, CERTIFIED REGISTERED

## 2024-12-09 RX ORDER — MORPHINE SULFATE 0.5 MG/ML
INJECTION, SOLUTION EPIDURAL; INTRATHECAL; INTRAVENOUS
Status: DISCONTINUED | OUTPATIENT
Start: 2024-12-09 | End: 2024-12-09 | Stop reason: SDUPTHER

## 2024-12-09 RX ORDER — ONDANSETRON 2 MG/ML
4 INJECTION INTRAMUSCULAR; INTRAVENOUS EVERY 6 HOURS PRN
Status: DISCONTINUED | OUTPATIENT
Start: 2024-12-09 | End: 2024-12-10

## 2024-12-09 RX ORDER — SODIUM CHLORIDE 9 MG/ML
INJECTION, SOLUTION INTRAVENOUS PRN
Status: DISCONTINUED | OUTPATIENT
Start: 2024-12-09 | End: 2024-12-10

## 2024-12-09 RX ORDER — SODIUM CHLORIDE, SODIUM LACTATE, POTASSIUM CHLORIDE, AND CALCIUM CHLORIDE .6; .31; .03; .02 G/100ML; G/100ML; G/100ML; G/100ML
1000 INJECTION, SOLUTION INTRAVENOUS ONCE
Status: DISCONTINUED | OUTPATIENT
Start: 2024-12-09 | End: 2024-12-10

## 2024-12-09 RX ORDER — ACETAMINOPHEN 325 MG/1
975 TABLET ORAL ONCE
Status: DISCONTINUED | OUTPATIENT
Start: 2024-12-09 | End: 2024-12-10

## 2024-12-09 RX ORDER — SODIUM CHLORIDE, SODIUM LACTATE, POTASSIUM CHLORIDE, CALCIUM CHLORIDE 600; 310; 30; 20 MG/100ML; MG/100ML; MG/100ML; MG/100ML
INJECTION, SOLUTION INTRAVENOUS
Status: DISCONTINUED | OUTPATIENT
Start: 2024-12-09 | End: 2024-12-09 | Stop reason: SDUPTHER

## 2024-12-09 RX ORDER — SODIUM CHLORIDE 0.9 % (FLUSH) 0.9 %
10 SYRINGE (ML) INJECTION PRN
Status: DISCONTINUED | OUTPATIENT
Start: 2024-12-09 | End: 2024-12-10

## 2024-12-09 RX ORDER — SODIUM CHLORIDE 0.9 % (FLUSH) 0.9 %
5-40 SYRINGE (ML) INJECTION EVERY 12 HOURS SCHEDULED
Status: DISCONTINUED | OUTPATIENT
Start: 2024-12-09 | End: 2024-12-10

## 2024-12-09 RX ORDER — CITRIC ACID/SODIUM CITRATE 334-500MG
30 SOLUTION, ORAL ORAL ONCE
Status: DISCONTINUED | OUTPATIENT
Start: 2024-12-09 | End: 2024-12-10

## 2024-12-09 RX ORDER — OXYTOCIN 10 [USP'U]/ML
INJECTION, SOLUTION INTRAMUSCULAR; INTRAVENOUS
Status: DISCONTINUED | OUTPATIENT
Start: 2024-12-09 | End: 2024-12-09 | Stop reason: SDUPTHER

## 2024-12-09 RX ORDER — SODIUM CHLORIDE, SODIUM LACTATE, POTASSIUM CHLORIDE, CALCIUM CHLORIDE 600; 310; 30; 20 MG/100ML; MG/100ML; MG/100ML; MG/100ML
INJECTION, SOLUTION INTRAVENOUS CONTINUOUS
Status: DISCONTINUED | OUTPATIENT
Start: 2024-12-09 | End: 2024-12-10

## 2024-12-09 RX ORDER — BUPIVACAINE HYDROCHLORIDE 7.5 MG/ML
INJECTION, SOLUTION INTRASPINAL
Status: DISCONTINUED | OUTPATIENT
Start: 2024-12-09 | End: 2024-12-09 | Stop reason: SDUPTHER

## 2024-12-09 RX ADMIN — OXYTOCIN 20 UNITS: 10 INJECTION, SOLUTION INTRAMUSCULAR; INTRAVENOUS at 21:07

## 2024-12-09 RX ADMIN — SODIUM CHLORIDE, SODIUM LACTATE, POTASSIUM CHLORIDE, AND CALCIUM CHLORIDE: .6; .31; .03; .02 INJECTION, SOLUTION INTRAVENOUS at 20:43

## 2024-12-09 RX ADMIN — CEFAZOLIN 2000 MG: 2 INJECTION, POWDER, FOR SOLUTION INTRAVENOUS at 20:55

## 2024-12-09 RX ADMIN — MORPHINE SULFATE 0.2 MG: 0.5 INJECTION EPIDURAL; INTRATHECAL; INTRAVENOUS at 20:47

## 2024-12-09 RX ADMIN — BUPIVACAINE HYDROCHLORIDE 1.6 ML: 7.5 INJECTION, SOLUTION SUBARACHNOID at 20:47

## 2024-12-09 RX ADMIN — SODIUM CHLORIDE, SODIUM LACTATE, POTASSIUM CHLORIDE, AND CALCIUM CHLORIDE: .6; .31; .03; .02 INJECTION, SOLUTION INTRAVENOUS at 21:29

## 2024-12-09 RX ADMIN — OXYTOCIN 10 UNITS: 10 INJECTION, SOLUTION INTRAMUSCULAR; INTRAVENOUS at 21:29

## 2024-12-09 RX ADMIN — SODIUM CHLORIDE, SODIUM LACTATE, POTASSIUM CHLORIDE, AND CALCIUM CHLORIDE: .6; .31; .03; .02 INJECTION, SOLUTION INTRAVENOUS at 21:07

## 2024-12-09 NOTE — ASSESSMENT & PLAN NOTE
BG logs reviewed  - has seen diabetes educator  - NPH 2 units in AM and 12 units at bedtime  - Continue 2x/week ANFS and q4wk growth US  - Delivery at 39 weeks or sooner as indicated    - ECV vs PLTCD for breech scheduled 12/14/24

## 2024-12-09 NOTE — ASSESSMENT & PLAN NOTE
[facility]  - scheduled for ECV with CD to follow if unsuccessful on 12/14/24 at 0800

## 2024-12-09 NOTE — ASSESSMENT & PLAN NOTE
- FWB: reassuring by roman today  - Genetic screening: MQS low risk  - Anatomy scan: early anatomy with MFM 6/20/24 (see below), 2nd trimester anatomy scheduled 7/18/24    - MFM 7/18/24: 221g, nl anatomy (suboptimal 4ch, AA), choriod plexus cyst noted, posterior low-lying placenta, normal fluid, CL 3.61cm    - US 8/9/24: 377g (27%ile), resolved CP cyst, resolved low-lying placenta  - Flu shot: 9/27/24  - Tdap: 9/27/24  - RSV: 11/14/24  - PNL: O+/ab-, RI, HepB/C neg, RPRnr, HIV neg, VZV immune, Hgb 13.9, UDS neg, GCCT neg, Ucx neg    - 1hr and 3hr GTT abnormal (see below), Hgb 12.8    - GBS negative

## 2024-12-09 NOTE — PROGRESS NOTES
Return OB Office Visit    CC:   Chief Complaint   Patient presents with    Routine Prenatal Visit       HPI:  Pt seen and examined. No concerns/complaints. Denies VB, LOF. +FM. Maybe an occasional contractions.    NPH 2 units in AM, 12 units PM.    Objective:  /60   Pulse 71   Wt 93.8 kg (206 lb 12.8 oz)   LMP 2023 Comment: missed AB 10/25/23  BMI 34.41 kg/m²   Gen: AO, NAD  Abd: Soft, NT  FHT: 143    Assessment/Plan:  34 y.o.  at 38w4d (Estimated Date of Delivery: 24) presents for VITO appointment:     Problem List Items Addressed This Visit       Missed  with fetal demise before 20 completed weeks of gestation     - h/o pregnancy loss at 16 weeks  - indeterminant APLAS work-up, saw MFM, would not recommend anticoagulation   - continue baby aspirin through pregnancy          Prenatal care in third trimester - Primary     - FWB: reassuring by doptonelizabeth today  - Genetic screening: MQS low risk  - Anatomy scan: early anatomy with MFM 24 (see below), 2nd trimester anatomy scheduled 24    - MFM 24: 221g, nl anatomy (suboptimal 4ch, AA), choriod plexus cyst noted, posterior low-lying placenta, normal fluid, CL 3.61cm    - US 24: 377g (27%ile), resolved CP cyst, resolved low-lying placenta  - Flu shot: 24  - Tdap: 24  - RSV: 24  - PNL: O+/ab-, RI, HepB/C neg, RPRnr, HIV neg, VZV immune, Hgb 13.9, UDS neg, GCCT neg, Ucx neg    - 1hr and 3hr GTT abnormal (see below), Hgb 12.8    - GBS negative          History of fetal anomaly in prior pregnancy, currently pregnant     - h/o elevated AFP in G1, noted to have fetal demise at 16 weeks with gastrochisis  - on folic acid  - early MFM consult placed, they recommend early anatomy -- completed 24: 86g, overall normal early anatomy without evidence of abdominal wall defects, posterior placenta  - MQS/AFP low risk  - plan q4wk growth US   - US 9/27/24: 1272g (59%ile)   - US 10/30/24: 1934g (30%ile)   - US

## 2024-12-10 PROBLEM — Z98.891 S/P PRIMARY LOW TRANSVERSE C-SECTION: Status: ACTIVE | Noted: 2024-12-10

## 2024-12-10 LAB
BASOPHILS # BLD: 0 K/UL (ref 0–0.2)
BASOPHILS NFR BLD: 0.2 %
DEPRECATED RDW RBC AUTO: 14.2 % (ref 12.4–15.4)
EOSINOPHIL # BLD: 0 K/UL (ref 0–0.6)
EOSINOPHIL NFR BLD: 0.1 %
GLUCOSE BLD-MCNC: 100 MG/DL (ref 70–99)
HCT VFR BLD AUTO: 35.1 % (ref 36–48)
HGB BLD-MCNC: 11.5 G/DL (ref 12–16)
LYMPHOCYTES # BLD: 1.6 K/UL (ref 1–5.1)
LYMPHOCYTES NFR BLD: 11.9 %
MCH RBC QN AUTO: 29.1 PG (ref 26–34)
MCHC RBC AUTO-ENTMCNC: 32.9 G/DL (ref 31–36)
MCV RBC AUTO: 88.4 FL (ref 80–100)
MONOCYTES # BLD: 0.9 K/UL (ref 0–1.3)
MONOCYTES NFR BLD: 6.4 %
NEUTROPHILS # BLD: 11 K/UL (ref 1.7–7.7)
NEUTROPHILS NFR BLD: 81.4 %
PERFORMED ON: ABNORMAL
PLATELET # BLD AUTO: 141 K/UL (ref 135–450)
PMV BLD AUTO: 9 FL (ref 5–10.5)
RBC # BLD AUTO: 3.97 M/UL (ref 4–5.2)
REAGIN+T PALLIDUM IGG+IGM SERPL-IMP: NORMAL
WBC # BLD AUTO: 13.5 K/UL (ref 4–11)

## 2024-12-10 PROCEDURE — 1220000000 HC SEMI PRIVATE OB R&B

## 2024-12-10 PROCEDURE — 6370000000 HC RX 637 (ALT 250 FOR IP): Performed by: OBSTETRICS & GYNECOLOGY

## 2024-12-10 PROCEDURE — 6360000002 HC RX W HCPCS: Performed by: OBSTETRICS & GYNECOLOGY

## 2024-12-10 PROCEDURE — 2580000003 HC RX 258: Performed by: OBSTETRICS & GYNECOLOGY

## 2024-12-10 PROCEDURE — 99024 POSTOP FOLLOW-UP VISIT: CPT | Performed by: OBSTETRICS & GYNECOLOGY

## 2024-12-10 PROCEDURE — 36415 COLL VENOUS BLD VENIPUNCTURE: CPT

## 2024-12-10 PROCEDURE — 85025 COMPLETE CBC W/AUTO DIFF WBC: CPT

## 2024-12-10 RX ORDER — SODIUM CHLORIDE 0.9 % (FLUSH) 0.9 %
5-40 SYRINGE (ML) INJECTION EVERY 12 HOURS SCHEDULED
Status: DISCONTINUED | OUTPATIENT
Start: 2024-12-10 | End: 2024-12-12 | Stop reason: HOSPADM

## 2024-12-10 RX ORDER — OXYCODONE HYDROCHLORIDE 5 MG/1
5 TABLET ORAL EVERY 6 HOURS PRN
Status: DISCONTINUED | OUTPATIENT
Start: 2024-12-10 | End: 2024-12-12 | Stop reason: HOSPADM

## 2024-12-10 RX ORDER — ONDANSETRON 4 MG/1
4 TABLET, ORALLY DISINTEGRATING ORAL EVERY 8 HOURS PRN
Status: DISCONTINUED | OUTPATIENT
Start: 2024-12-10 | End: 2024-12-12 | Stop reason: HOSPADM

## 2024-12-10 RX ORDER — ONDANSETRON 2 MG/ML
4 INJECTION INTRAMUSCULAR; INTRAVENOUS EVERY 6 HOURS PRN
Status: DISCONTINUED | OUTPATIENT
Start: 2024-12-10 | End: 2024-12-12 | Stop reason: HOSPADM

## 2024-12-10 RX ORDER — FERROUS SULFATE 325(65) MG
325 TABLET ORAL
Status: DISCONTINUED | OUTPATIENT
Start: 2024-12-10 | End: 2024-12-12 | Stop reason: HOSPADM

## 2024-12-10 RX ORDER — ACETAMINOPHEN 500 MG
1000 TABLET ORAL EVERY 8 HOURS
Status: DISCONTINUED | OUTPATIENT
Start: 2024-12-10 | End: 2024-12-12 | Stop reason: HOSPADM

## 2024-12-10 RX ORDER — IBUPROFEN 800 MG/1
800 TABLET, FILM COATED ORAL EVERY 8 HOURS
Status: DISCONTINUED | OUTPATIENT
Start: 2024-12-10 | End: 2024-12-12 | Stop reason: HOSPADM

## 2024-12-10 RX ORDER — PRENATAL WITH FERROUS FUM AND FOLIC ACID 3080; 920; 120; 400; 22; 1.84; 3; 20; 10; 1; 12; 200; 27; 25; 2 [IU]/1; [IU]/1; MG/1; [IU]/1; MG/1; MG/1; MG/1; MG/1; MG/1; MG/1; UG/1; MG/1; MG/1; MG/1; MG/1
1 TABLET ORAL DAILY
Status: DISCONTINUED | OUTPATIENT
Start: 2024-12-10 | End: 2024-12-12 | Stop reason: HOSPADM

## 2024-12-10 RX ORDER — SODIUM CHLORIDE, SODIUM LACTATE, POTASSIUM CHLORIDE, CALCIUM CHLORIDE 600; 310; 30; 20 MG/100ML; MG/100ML; MG/100ML; MG/100ML
INJECTION, SOLUTION INTRAVENOUS CONTINUOUS
Status: DISCONTINUED | OUTPATIENT
Start: 2024-12-10 | End: 2024-12-12 | Stop reason: HOSPADM

## 2024-12-10 RX ORDER — DOCUSATE SODIUM 100 MG/1
100 CAPSULE, LIQUID FILLED ORAL 2 TIMES DAILY
Status: DISCONTINUED | OUTPATIENT
Start: 2024-12-10 | End: 2024-12-12 | Stop reason: HOSPADM

## 2024-12-10 RX ORDER — SODIUM CHLORIDE 9 MG/ML
INJECTION, SOLUTION INTRAVENOUS PRN
Status: DISCONTINUED | OUTPATIENT
Start: 2024-12-10 | End: 2024-12-12 | Stop reason: HOSPADM

## 2024-12-10 RX ORDER — SODIUM CHLORIDE 0.9 % (FLUSH) 0.9 %
5-40 SYRINGE (ML) INJECTION PRN
Status: DISCONTINUED | OUTPATIENT
Start: 2024-12-10 | End: 2024-12-12 | Stop reason: HOSPADM

## 2024-12-10 RX ORDER — OXYCODONE HYDROCHLORIDE 5 MG/1
10 TABLET ORAL EVERY 6 HOURS PRN
Status: DISCONTINUED | OUTPATIENT
Start: 2024-12-10 | End: 2024-12-12 | Stop reason: HOSPADM

## 2024-12-10 RX ADMIN — SODIUM CHLORIDE, SODIUM LACTATE, POTASSIUM CHLORIDE, AND CALCIUM CHLORIDE: .6; .31; .03; .02 INJECTION, SOLUTION INTRAVENOUS at 00:02

## 2024-12-10 RX ADMIN — DOCUSATE SODIUM 100 MG: 100 CAPSULE, LIQUID FILLED ORAL at 20:34

## 2024-12-10 RX ADMIN — ACETAMINOPHEN 1000 MG: 500 TABLET ORAL at 16:18

## 2024-12-10 RX ADMIN — IBUPROFEN 800 MG: 800 TABLET, FILM COATED ORAL at 09:34

## 2024-12-10 RX ADMIN — HYDROMORPHONE HYDROCHLORIDE 0.25 MG: 1 INJECTION, SOLUTION INTRAMUSCULAR; INTRAVENOUS; SUBCUTANEOUS at 00:54

## 2024-12-10 RX ADMIN — FERROUS SULFATE TAB 325 MG (65 MG ELEMENTAL FE) 325 MG: 325 (65 FE) TAB at 09:34

## 2024-12-10 RX ADMIN — DOCUSATE SODIUM 100 MG: 100 CAPSULE, LIQUID FILLED ORAL at 09:34

## 2024-12-10 RX ADMIN — PRENATAL WITH FERROUS FUM AND FOLIC ACID 1 TABLET: 3080; 920; 120; 400; 22; 1.84; 3; 20; 10; 1; 12; 200; 27; 25; 2 TABLET ORAL at 09:34

## 2024-12-10 RX ADMIN — IBUPROFEN 800 MG: 800 TABLET, FILM COATED ORAL at 20:34

## 2024-12-10 RX ADMIN — Medication 10 ML: at 20:35

## 2024-12-10 ASSESSMENT — PAIN - FUNCTIONAL ASSESSMENT
PAIN_FUNCTIONAL_ASSESSMENT: ACTIVITIES ARE NOT PREVENTED

## 2024-12-10 ASSESSMENT — PAIN SCALES - GENERAL
PAINLEVEL_OUTOF10: 2
PAINLEVEL_OUTOF10: 4
PAINLEVEL_OUTOF10: 4

## 2024-12-10 ASSESSMENT — PAIN DESCRIPTION - DESCRIPTORS
DESCRIPTORS: DISCOMFORT
DESCRIPTORS: ACHING;SORE
DESCRIPTORS: DISCOMFORT

## 2024-12-10 ASSESSMENT — PAIN DESCRIPTION - LOCATION
LOCATION: INCISION;ABDOMEN
LOCATION: ABDOMEN
LOCATION: ABDOMEN

## 2024-12-10 NOTE — ANESTHESIA PROCEDURE NOTES
Spinal Block    Patient location during procedure: OR  End time: 12/9/2024 8:47 PM  Reason for block: primary anesthetic  Staffing  Performed: resident/CRNA   Resident/CRNA: Tanmay Amador APRN - CRNA  Performed by: Tanmay Amador APRN - CRNA  Authorized by: Layton Matias MD    Spinal Block  Patient position: sitting  Prep: ChloraPrep  Patient monitoring: cardiac monitor, continuous pulse ox and frequent blood pressure checks  Approach: midline  Location: L3/L4  Provider prep: mask  Local infiltration: lidocaine  Needle  Needle type: Pencan   Needle gauge: 24 G  Needle length: 4 in  Assessment  Sensory level: T6  Swirl obtained: Yes  CSF: clear  Attempts: 1  Hemodynamics: stable  Additional Notes  Pt. Sitting, sterile prep/drape, 1%Lido @ L3-4, 24ga pencil point needle inserted via introducer, positive clear, free flowing CSF x 4 quad, 1.6ml 0.75% spinal marcaine and duramorph 0.2mg intrathecally   Preanesthetic Checklist  Completed: patient identified, IV checked, site marked, risks and benefits discussed, surgical/procedural consents, equipment checked, pre-op evaluation, timeout performed, anesthesia consent given, oxygen available and monitors applied/VS acknowledged

## 2024-12-10 NOTE — ANESTHESIA POSTPROCEDURE EVALUATION
Department of Anesthesiology  Postprocedure Note    Patient: Monet Valverde  MRN: [id number]  YOB: 1990  Date of evaluation: 12/10/2024    Procedure Summary       Date: 24 Room / Location: [address]    Anesthesia Start:  Anesthesia Stop:     Procedure:  SECTION (Abdomen) Diagnosis:       Breech presentation, single or unspecified fetus      (Breech presentation, single or unspecified fetus [O32.1XX0])    Surgeons: Cari Mayers DO Responsible Provider: Layton Matias MD    Anesthesia Type: Spinal ASA Status: 2            Anesthesia Type: Spinal    Erik Phase I: Erik Score: 9    Erik Phase II: Erik Score: 9    Anesthesia Post Evaluation    Patient location during evaluation: bedside  Patient participation: complete - patient participated  Level of consciousness: awake and alert  Airway patency: patent  Nausea & Vomiting: no nausea and no vomiting  Cardiovascular status: hemodynamically stable  Respiratory status: spontaneous ventilation and room air  Hydration status: stable  Comments: Mild itching  Multimodal analgesia pain management approach  Pain management: adequate and satisfactory to patient        No notable events documented.

## 2024-12-10 NOTE — H&P
Department of Obstetrics and Gynecology  Attending Obstetrics History and Physical        CHIEF COMPLAINT:  contractions, leakage of amniotic fluid    HISTORY OF PRESENT ILLNESS:      The patient is a 34 y.o.  3 parity 0020 at 38 weeks 4 days gestation with EDC 24 presents to triage with complaint of leakage of fluid and bleeding.  Patient noted a little bit of fluid when she sat down at home this evening.  With further monitoring, patient noted further gushes of fluid and some bleeding (no recent intercourse).  Patient presented to triage and rupture of membranes was confirmed.  Upon arrival patient noted an abrupt increase in symptoms--contractions and pelvic pain.  Cervical evaluation revealed 4 cm cervix and breech presentation.  Breech presentation was briefly confirmed with limited bedside ultrasound.    Patient admits to fetal movement.   Pregnancy is complicated by breech presentation, GDMA2, missed AB x 2, low lying placenta (resolved early 2nd trimester), history of fetal anomaly in prior pregnancy, and ASCUS with positive high risk HPV.     DATES:    Last Menstrual Period:  23  Estimated Due Date:  24    PRENATAL CARE:    Provider:  FAB Vaz MD, [facility]    Blood Type/Rh:  O positive  Antibody Screen:  negative  Rubella:  immune  RPR:  non-reactive  Hepatitis B Surface Antigen: non-reactive  HIV:  non-reactive  Gonorrhea:  negative  Chlamydia:  negative  MSAFP/Multiple Markers:  Date:  24; Results:  normal  U/S Structural Survery:  see report  1 hour Glucose Tolerance Test:  261  Group B Strep:  negative  3 Hour Glucose Tolerance Test:  106/248/248/181    PAST OB HISTORY        Depression:  No      Post-partum depression:  No      Diabetes:  No      Gestational Diabetes:  Yes       Thyroid Disease:  No      Chronic HTN:  No      Gestation HTN:  No      Pre-eclampsia:  No      Seizure disorder:  No      Asthma:  No      Clotting disorder:  No      :  No

## 2024-12-10 NOTE — PLAN OF CARE
Problem: Chronic Conditions and Co-morbidities  Goal: Patient's chronic conditions and co-morbidity symptoms are monitored and maintained or improved  Outcome: Progressing     Problem: Pain  Goal: Verbalizes/displays adequate comfort level or baseline comfort level  Outcome: Progressing     Problem: Vaginal Birth or  Section  Goal: Fetal and maternal status remain reassuring during the birth process  Description:  Birth OB-Pregnancy care plan goal which identifies if the fetal and maternal status remain reassuring during the birth process  Outcome: Completed     Problem: Postpartum  Goal: Experiences normal postpartum course  Description:  Postpartum OB-Pregnancy care plan goal which identifies if the mother is experiencing a normal postpartum course  Outcome: Progressing  Goal: Appropriate maternal -  bonding  Description:  Postpartum OB-Pregnancy care plan goal which identifies if the mother and  are bonding appropriately  Outcome: Progressing  Goal: Establishment of infant feeding pattern  Description:  Postpartum OB-Pregnancy care plan goal which identifies if the mother is establishing a feeding pattern with their   Outcome: Progressing  Goal: Incisions, wounds, or drain sites healing without S/S of infection  Outcome: Progressing     Problem: Infection - Adult  Goal: Absence of infection at discharge  Outcome: Progressing  Goal: Absence of infection during hospitalization  Outcome: Progressing     Problem: Safety - Adult  Goal: Free from fall injury  Outcome: Progressing     Problem: Discharge Planning  Goal: Discharge to home or other facility with appropriate resources  Outcome: Progressing

## 2024-12-10 NOTE — ANESTHESIA PRE PROCEDURE
Department of Anesthesiology  Preprocedure Note       Name:  Monet Valverde   Age:  34 y.o.  :  1990                                          MRN:  [id number]         Date:  2024      Surgeon: Surgeon(s):  Cari Mayers DO    Procedure: Procedure(s):   SECTION    Medications prior to admission:   Prior to Admission medications    Medication Sig Start Date End Date Taking? Authorizing Provider   insulin NPH (HUMULIN N KWIKPEN) 100 UNIT/ML injection pen Inject 18 Units into the skin daily : 2 units in AM and 12 units at Night with 2 test units 24  Yes Toyin Austin DO   Probiotic Product (PROBIOTIC PO) Take by mouth   Yes Donny Montgomery MD   aspirin 81 MG chewable tablet Take 1 tablet by mouth daily   Yes Donny Montgomery MD   Prenatal Vit-DSS-Fe Fum-FA (PNV FE FUM/DOCUSATE/FOLIC ACID PO) Take by mouth   Yes Donny Montgomery MD   Lancets (ONETOUCH DELICA PLUS [id number]) MISC USE AS DIRECTED 24   Key Vaz MD   blood glucose test strips (ONETOUCH ULTRA TEST) strip USE AS DIRECTED 24   Key Vaz MD   blood glucose monitor kit and supplies Dispense sufficient amount for testing  4 times a day. Fasting and 1 hour post prandial. Dispense all needed supplies to include: monitor, strips, lancing device, alcohol swabs. Refills x1 .Insurance preferred meter 10/3/24   Key Vaz MD   folic acid (FOLVITE) 1 MG tablet Take 4 tablets by mouth daily 24   Key Vaz MD   calcium carbonate (TUMS) 500 MG chewable tablet Take 1 tablet by mouth daily    Donny Montgomery MD       Current medications:    Current Facility-Administered Medications   Medication Dose Route Frequency Provider Last Rate Last Admin    lactated ringers infusion   IntraVENous Continuous Cari Mayers DO        lactated ringers bolus 1,000 mL  1,000 mL IntraVENous Once Cari Mayers DO        sodium chloride flush 0.9 % injection 5-40 mL  5-40 mL

## 2024-12-10 NOTE — L&D DELIVERY SUMMARY NOTE
Department of Obstetrics and Gynecology  Primary Low Transverse  Section Note    Indications: Breech presentation, active labor    Pre-operative Diagnosis: 38 week 4 day pregnancy.    Post-operative Diagnosis: Living  infant(s) and Female    Surgeon: Cari Mayers DO     Assistants: see OR record    Anesthesia: Spinal anesthesia    ASA Class: see anesthesia notes    Procedure Details   The patient was seen in the Triage Room. The risks, benefits, complications, treatment options, and expected outcomes were discussed with the patient.  The patient concurred with the proposed plan, giving informed consent.  The site of surgery properly noted. The patient was taken to Operating Room # 2, identified as Monet Valverde and the procedure verified as  Delivery. A Time Out was held and the above information confirmed.    After administration of anesthesia, the patient was draped and prepped in the usual sterile manner. A Pfannenstiel incision was made and carried down through the subcutaneous tissue to the fascia. Fascial incision was made and extended transversely. The fascia was  from the underlying rectus tissue superiorly and inferiorly. The peritoneum was identified and entered. Peritoneal incision was extended longitudinally. The utero-vesical peritoneal reflection was incised transversely and the bladder flap was bluntly freed from the lower uterine segment. A low transverse uterine incision was made. Delivered from Ranjit breech presentation was a viable 3.26 kg female  with Apgar scores of 9 at one minute and 9 at five minutes. A nuchal cord was reduced with delivery of the .  After the umbilical cord was clamped and cut, a segment of cord was collected but later discarded due to fetal wellbeing.  Cord blood was obtained for evaluation. The placenta was removed intact and appeared normal. The uterine outline, tubes and ovaries appeared normal. The uterine

## 2024-12-11 PROCEDURE — 6370000000 HC RX 637 (ALT 250 FOR IP): Performed by: OBSTETRICS & GYNECOLOGY

## 2024-12-11 PROCEDURE — 99024 POSTOP FOLLOW-UP VISIT: CPT | Performed by: OBSTETRICS & GYNECOLOGY

## 2024-12-11 PROCEDURE — 1220000000 HC SEMI PRIVATE OB R&B

## 2024-12-11 RX ORDER — SIMETHICONE 80 MG
80 TABLET,CHEWABLE ORAL EVERY 6 HOURS PRN
Status: DISCONTINUED | OUTPATIENT
Start: 2024-12-11 | End: 2024-12-12 | Stop reason: HOSPADM

## 2024-12-11 RX ADMIN — ACETAMINOPHEN 1000 MG: 500 TABLET ORAL at 10:16

## 2024-12-11 RX ADMIN — IBUPROFEN 800 MG: 800 TABLET, FILM COATED ORAL at 04:39

## 2024-12-11 RX ADMIN — ACETAMINOPHEN 1000 MG: 500 TABLET ORAL at 17:49

## 2024-12-11 RX ADMIN — DOCUSATE SODIUM 100 MG: 100 CAPSULE, LIQUID FILLED ORAL at 23:11

## 2024-12-11 RX ADMIN — PRENATAL WITH FERROUS FUM AND FOLIC ACID 1 TABLET: 3080; 920; 120; 400; 22; 1.84; 3; 20; 10; 1; 12; 200; 27; 25; 2 TABLET ORAL at 10:16

## 2024-12-11 RX ADMIN — IBUPROFEN 800 MG: 800 TABLET, FILM COATED ORAL at 19:38

## 2024-12-11 RX ADMIN — ACETAMINOPHEN 1000 MG: 500 TABLET ORAL at 00:48

## 2024-12-11 RX ADMIN — DOCUSATE SODIUM 100 MG: 100 CAPSULE, LIQUID FILLED ORAL at 10:16

## 2024-12-11 RX ADMIN — IBUPROFEN 800 MG: 800 TABLET, FILM COATED ORAL at 12:28

## 2024-12-11 ASSESSMENT — PAIN - FUNCTIONAL ASSESSMENT
PAIN_FUNCTIONAL_ASSESSMENT: ACTIVITIES ARE NOT PREVENTED
PAIN_FUNCTIONAL_ASSESSMENT: PREVENTS OR INTERFERES SOME ACTIVE ACTIVITIES AND ADLS

## 2024-12-11 ASSESSMENT — PAIN DESCRIPTION - DESCRIPTORS
DESCRIPTORS: SORE
DESCRIPTORS: CRAMPING

## 2024-12-11 ASSESSMENT — PAIN SCALES - GENERAL
PAINLEVEL_OUTOF10: 2
PAINLEVEL_OUTOF10: 1
PAINLEVEL_OUTOF10: 2

## 2024-12-11 ASSESSMENT — PAIN DESCRIPTION - LOCATION
LOCATION: ABDOMEN
LOCATION: ABDOMEN

## 2024-12-11 ASSESSMENT — PAIN DESCRIPTION - ORIENTATION: ORIENTATION: LOWER

## 2024-12-11 NOTE — PLAN OF CARE
Problem: Chronic Conditions and Co-morbidities  Goal: Patient's chronic conditions and co-morbidity symptoms are monitored and maintained or improved  2024 by Suzette Zarate RN  Outcome: Progressing  12/10/2024 2256 by Viviana Fernandez RN  Outcome: Progressing     Problem: Pain  Goal: Verbalizes/displays adequate comfort level or baseline comfort level  2024 by Suzette Zarate RN  Outcome: Progressing  12/10/2024 2256 by Viviana Fernandez RN  Outcome: Progressing     Problem: Postpartum  Goal: Experiences normal postpartum course  Description:  Postpartum OB-Pregnancy care plan goal which identifies if the mother is experiencing a normal postpartum course  2024 by Suzette Zarate RN  Outcome: Progressing  12/10/2024 2256 by Viviana Fernandez RN  Outcome: Progressing  Goal: Appropriate maternal -  bonding  Description:  Postpartum OB-Pregnancy care plan goal which identifies if the mother and  are bonding appropriately  2024 by Suzette Zarate RN  Outcome: Progressing  12/10/2024 2256 by Viviana Fernandez RN  Outcome: Progressing  Goal: Establishment of infant feeding pattern  Description:  Postpartum OB-Pregnancy care plan goal which identifies if the mother is establishing a feeding pattern with their   2024 by Suzette Zarate RN  Outcome: Progressing  12/10/2024 2256 by Viviana Fernandez RN  Outcome: Progressing  Goal: Incisions, wounds, or drain sites healing without S/S of infection  2024 by Suzette Zarate RN  Outcome: Progressing  12/10/2024 2256 by Viviana Fernandez RN  Outcome: Progressing     Problem: Infection - Adult  Goal: Absence of infection at discharge  2024 by Suzette Zarate RN  Outcome: Progressing  12/10/2024 2256 by Viviana Fernandez RN  Outcome: Progressing  Goal: Absence of infection during hospitalization  2024 by Suzette Zarate RN  Outcome:

## 2024-12-11 NOTE — PLAN OF CARE
Problem: Chronic Conditions and Co-morbidities  Goal: Patient's chronic conditions and co-morbidity symptoms are monitored and maintained or improved  2024 by Pia Wilson RN  Outcome: Progressing  2024 by Suzette Zarate RN  Outcome: Progressing     Problem: Pain  Goal: Verbalizes/displays adequate comfort level or baseline comfort level  2024 by Pia Wilson RN  Outcome: Progressing  2024 by Suzette Zarate RN  Outcome: Progressing     Problem: Postpartum  Goal: Experiences normal postpartum course  Description:  Postpartum OB-Pregnancy care plan goal which identifies if the mother is experiencing a normal postpartum course  2024 by Pia Wilson RN  Outcome: Progressing  2024 by Suzette Zarate RN  Outcome: Progressing  Goal: Appropriate maternal -  bonding  Description:  Postpartum OB-Pregnancy care plan goal which identifies if the mother and  are bonding appropriately  2024 by Pia Wilson RN  Outcome: Progressing  2024 by Suzette Zarate RN  Outcome: Progressing  Goal: Establishment of infant feeding pattern  Description:  Postpartum OB-Pregnancy care plan goal which identifies if the mother is establishing a feeding pattern with their   2024 by Suzette Zarate RN  Outcome: Progressing  Goal: Incisions, wounds, or drain sites healing without S/S of infection  2024 by Suzette Zarate RN  Outcome: Progressing     Problem: Infection - Adult  Goal: Absence of infection at discharge  2024 by Suzette Zarate RN  Outcome: Progressing  Goal: Absence of infection during hospitalization  2024 by Suzette Zarate RN  Outcome: Progressing     Problem: Safety - Adult  Goal: Free from fall injury  2024 by Suzette Zarate RN  Outcome: Progressing     Problem: Discharge

## 2024-12-12 ENCOUNTER — LACTATION ENCOUNTER (OUTPATIENT)
Dept: NURSERY | Age: 34
End: 2024-12-12

## 2024-12-12 VITALS
WEIGHT: 206.8 LBS | BODY MASS INDEX: 34.45 KG/M2 | HEIGHT: 65 IN | SYSTOLIC BLOOD PRESSURE: 128 MMHG | TEMPERATURE: 97.9 F | OXYGEN SATURATION: 98 % | DIASTOLIC BLOOD PRESSURE: 67 MMHG | RESPIRATION RATE: 16 BRPM | HEART RATE: 95 BPM

## 2024-12-12 PROCEDURE — 6370000000 HC RX 637 (ALT 250 FOR IP): Performed by: OBSTETRICS & GYNECOLOGY

## 2024-12-12 PROCEDURE — 99024 POSTOP FOLLOW-UP VISIT: CPT | Performed by: OBSTETRICS & GYNECOLOGY

## 2024-12-12 RX ORDER — IBUPROFEN 800 MG/1
800 TABLET, FILM COATED ORAL EVERY 8 HOURS
Qty: 120 TABLET | Refills: 3 | Status: SHIPPED | OUTPATIENT
Start: 2024-12-12

## 2024-12-12 RX ORDER — FERROUS SULFATE 325(65) MG
325 TABLET ORAL
Qty: 30 TABLET | Refills: 3 | Status: SHIPPED | OUTPATIENT
Start: 2024-12-12

## 2024-12-12 RX ORDER — OXYCODONE HYDROCHLORIDE 5 MG/1
5 TABLET ORAL EVERY 6 HOURS PRN
Qty: 28 TABLET | Refills: 0 | Status: SHIPPED | OUTPATIENT
Start: 2024-12-12 | End: 2024-12-19

## 2024-12-12 RX ORDER — PSEUDOEPHEDRINE HCL 30 MG
100 TABLET ORAL 2 TIMES DAILY
Qty: 120 CAPSULE | Refills: 0 | Status: SHIPPED | OUTPATIENT
Start: 2024-12-12

## 2024-12-12 RX ADMIN — IBUPROFEN 800 MG: 800 TABLET, FILM COATED ORAL at 04:53

## 2024-12-12 RX ADMIN — IBUPROFEN 800 MG: 800 TABLET, FILM COATED ORAL at 13:19

## 2024-12-12 RX ADMIN — DOCUSATE SODIUM 100 MG: 100 CAPSULE, LIQUID FILLED ORAL at 10:10

## 2024-12-12 RX ADMIN — PRENATAL WITH FERROUS FUM AND FOLIC ACID 1 TABLET: 3080; 920; 120; 400; 22; 1.84; 3; 20; 10; 1; 12; 200; 27; 25; 2 TABLET ORAL at 10:10

## 2024-12-12 RX ADMIN — ACETAMINOPHEN 1000 MG: 500 TABLET ORAL at 10:10

## 2024-12-12 RX ADMIN — ACETAMINOPHEN 1000 MG: 500 TABLET ORAL at 01:25

## 2024-12-12 RX ADMIN — ACETAMINOPHEN 1000 MG: 500 TABLET ORAL at 18:39

## 2024-12-12 ASSESSMENT — PAIN SCALES - GENERAL
PAINLEVEL_OUTOF10: 3
PAINLEVEL_OUTOF10: 1
PAINLEVEL_OUTOF10: 2
PAINLEVEL_OUTOF10: 2
PAINLEVEL_OUTOF10: 1
PAINLEVEL_OUTOF10: 1
PAINLEVEL_OUTOF10: 2

## 2024-12-12 ASSESSMENT — PAIN DESCRIPTION - LOCATION
LOCATION: BACK;INCISION
LOCATION: ABDOMEN
LOCATION: ABDOMEN
LOCATION: INCISION
LOCATION: ABDOMEN;INCISION

## 2024-12-12 ASSESSMENT — PAIN DESCRIPTION - ORIENTATION
ORIENTATION: MID;LOWER
ORIENTATION: MID;LOWER

## 2024-12-12 ASSESSMENT — PAIN DESCRIPTION - DESCRIPTORS
DESCRIPTORS: CRAMPING
DESCRIPTORS: CRAMPING
DESCRIPTORS: SORE

## 2024-12-12 NOTE — PLAN OF CARE
Problem: Chronic Conditions and Co-morbidities  Goal: Patient's chronic conditions and co-morbidity symptoms are monitored and maintained or improved  Outcome: Progressing     Problem: Pain  Goal: Verbalizes/displays adequate comfort level or baseline comfort level  Outcome: Progressing     Problem: Postpartum  Goal: Experiences normal postpartum course  Description:  Postpartum OB-Pregnancy care plan goal which identifies if the mother is experiencing a normal postpartum course  Outcome: Progressing  Goal: Appropriate maternal -  bonding  Description:  Postpartum OB-Pregnancy care plan goal which identifies if the mother and  are bonding appropriately  Outcome: Progressing  Goal: Establishment of infant feeding pattern  Description:  Postpartum OB-Pregnancy care plan goal which identifies if the mother is establishing a feeding pattern with their   Outcome: Progressing  Goal: Incisions, wounds, or drain sites healing without S/S of infection  Outcome: Progressing     Problem: Infection - Adult  Goal: Absence of infection at discharge  Outcome: Progressing  Goal: Absence of infection during hospitalization  Outcome: Progressing     Problem: Safety - Adult  Goal: Free from fall injury  Outcome: Progressing     Problem: Discharge Planning  Goal: Discharge to home or other facility with appropriate resources  Outcome: Progressing

## 2024-12-12 NOTE — LACTATION NOTE
This note was copied from a baby's chart.  LACTATION CONSULTATION      Follow-up Consult: Reason for Follow-up: assess needs  and discharge education      MOB reports she is feeling more confident this morning with breastfeeding. Had some good feedings overnight, and mob states her breast are filling, with leaking noted to right breast. MOB continues using nipple shield with feedings. MOB reports good output overnight.      Name: Girl Monet Valverde       MRN: [id number]               YOB: 2022   Time of Birth: 9:04 PM   Gestational age: Gestational Age: 38w4d   Birth Weight: Birth Weight: 3.26 kg (7 lb 3 oz) Most Recent Weight: Weight: 2.978 kg (6 lb 9 oz)   Weight Change from Birth: -9%            Maternal Assessment:      Maternal Data:   Information for the patient's mother:  Monet Valverde [[id number]]   32 y.o.   /Para:   Information for the patient's mother:  Monet Valverde [[id number]]       Information for the patient's mother:  Monet Valverde [[id number]]   38w4d         Breast Assessment Breast are filling, nipples are intact with minimal soreness noted per mob.     Infant Assessment:      DOL: Infant 3 days old.      Feeding: Breastfeeding      Nipple Shield in Use: Yes  Nipple Shield Size:      I&O adequacy:  Urine output: is established  Stool output: is established  Percent weight change from birthweight: -9%     Glucose: Yes   54, 62, 63, 55     Intervention during consultation:     Interventions Performed:   Education     Latch & Positioning: Encouraged mob to call with next feeding prior to discharge home today, or as needed. Feeling more confident and positioning infant in football hold to breast with feedings using a nipple shield.    Pump Arrangements:  Owns breast pump    Pump Distributed: No     Education:  Latch & positioning , Feeding frequency & feeding cues , Discharge breastfeeding education , Expected intake and output , Feeding and diaper log , 
This note was copied from a baby's chart.  LACTATION CONSULTATION      Follow-up Consult: Reason for Follow-up: assist with latching  and Maternal request        Name: Girl Monet Valverde       MRN: [id number]               YOB: 2022   Time of Birth: 9:04 PM   Gestational age: Gestational Age: 38w4d   Birth Weight: Birth Weight: 3.26 kg (7 lb 3 oz) Most Recent Weight: Weight: 3.128 kg (6 lb 14.3 oz)   Weight Change from Birth: -4%            Maternal Assessment:      Maternal Data:   Information for the patient's mother:  Monet Valverde [[id number]]   32 y.o.   /Para:   Information for the patient's mother:  Monet Valverde [[id number]]       Information for the patient's mother:  Monet Valverde [[id number]]   38w4d         Breast Assessment  Right Breast: Large  Right Nipple: Everts well  and Short  Right Areola: WDL   Right Nipple Comfort: comfortable   Right Nipple Integrity: Intact    Left Breast:Not assessed this consult     Infant Assessment:      DOL:40 hours of life      Feeding: Breastfeeding      Nipple Shield in Use: Yes  Nipple Shield Size: Small 20mm      I&O adequacy:  Urine output: is established  Stool output: is established  Percent weight change from birthweight: -4%     Intervention during consultation:     Interventions Performed:   Assisted with breastfeeding     Latch & Positioning: Infant asleep in crib. Infant undressed, diaper checked and brought to mother for STS contact. Infant alert beginning to softly root. Reviewed how to position baby well to the breast in football hold, so that infant was belly to belly and nose to nipple with good alignment. Reviewed how to offer breast support, and steps to obtain ENRRIQUE. Infant rooting but slipping off the breast with repeated attempts to latch and becoming fussy. Suggested to try with nipple shield and mother agreeable. Reviewed indications for use, how to apply, monitoring for deep latch and milk transfer and 
This note was copied from a baby's chart.  LACTATION CONSULTATION      Follow-up Consult: Reason for Follow-up: assist with latching  and Maternal request      ID#:       Name: Girl Monet Valverde       MRN: [id number]               YOB: 2022   Time of Birth: 9:04 PM   Gestational age: Gestational Age: 38w4d   Birth Weight: Birth Weight: 3.26 kg (7 lb 3 oz) Most Recent Weight: Weight: 3.128 kg (6 lb 14.3 oz)   Weight Change from Birth: -4%            Maternal Assessment:      Maternal Data:   Information for the patient's mother:  Monet Valverde ROSALIE [[id number]]   32 y.o.   /Para:   Information for the patient's mother:  Monet Valverde [[id number]]       Information for the patient's mother:  Monet Valverde [[id number]]   38w4d         Breast Assessment  Right Breast: Not assessed this encounter    Left Breast: WDL  Left Nipple: Everts well  and Short  Left Areola: WDL   Left Nipple Comfort: comfortable   Left Nipple Integrity: Intact     Infant Assessment:      DOL:37 hours of life      Feeding: Breastfeeding      Nipple Shield in Use: Yes  Nipple Shield Size: Small 20mm      I&O adequacy:  Urine output: is established  Stool output: is established  Percent weight change from birthweight: -4%     Intervention during consultation:     Interventions Performed:   Assisted with breastfeeding  and Education     Latch & Positioning: Mother positioned baby to the left breast and offering breast support in football hold. Encouraged slight adjustment in position so that infant was more belly to belly and nose to nipple and assisted with this as needed. Shown how infant with better alignment and closer to the breast. Explained how this improved position will help obtain deeper latch. Attempted without the nipple shield initially but infant struggled so shield was applied. Reviewed indications for use, how to apply, importance of deep latch and monitoring for milk transfer and 
This note was copied from a baby's chart.  LACTATION CONSULTATION      Follow-up Consult: Reason for Follow-up: assist with latching  and Medical supplementation Order for decreased urine output.     MOB requesting LC assistance to observe infant latch, and assist with supplementation.     Name: Girl Monet Valverde       MRN: [id number]               YOB: 2022   Time of Birth: 9:04 PM   Gestational age: Gestational Age: 38w4d   Birth Weight: Birth Weight: 3.26 kg (7 lb 3 oz) Most Recent Weight: Weight: 2.978 kg (6 lb 9 oz)   Weight Change from Birth: -9%            Maternal Assessment:      Maternal Data:   Information for the patient's mother:  Monet Valverde ROSALIE [[id number]]   32 y.o.   /Para:   Information for the patient's mother:  Monet Valverde ROSALIE [[id number]]       Information for the patient's mother:  Monet Valverde ROSALIE [[id number]]   38w4d         Breast Assessment  Right Breast: Filling  Right Nipple: Everts well , Short, and Flat   Right Areola: WDL   Right Nipple Comfort: comfortable   Right Nipple Integrity: Intact    Left Breast: Filling  Left Nipple: Everts well , Short, and Flat   Left Areola: WDL   Left Nipple Comfort: comfortable   Left Nipple Integrity: Intact     Infant Assessment:      DOL:Infant 3 days old      Feeding: Breastfeeding      Use of Supplementation: Medical due to:  decrease output    Type of Supplementation: Breastmilk   Method of Supplementation: Syringe  Alternative feeding method offered:       Nipple Shield in Use: Yes  Nipple Shield Size: Small 20mm      I&O adequacy:  Urine output: is established  Stool output: is established  Percent weight change from birthweight: -9%   Intervention during consultation:     Interventions Performed:   Assisted with breastfeeding , Electric breast pump , Syringe feeding, Education , and Skin to skin     Latch & Positioning: MOB able to position infant to right breast using football hold. Infant needing some 
This note was copied from a baby's chart.  LACTATION CONSULTATION      Follow-up Consult: Reason for Follow-up: assist with latching  and RN request         Name: Girl Monet Valverde       MRN: [id number]               YOB: 2022   Time of Birth: 9:04 PM   Gestational age: Gestational Age: 38w4d   Birth Weight: Birth Weight: 3.26 kg (7 lb 3 oz) Most Recent Weight: Weight: 3.128 kg (6 lb 14.3 oz)   Weight Change from Birth: -4%            Maternal Assessment:      Maternal Data:   Information for the patient's mother:  Monet Valverde [[id number]]   32 y.o.   /Para:   Information for the patient's mother:  Monet Valverde [[id number]]       Information for the patient's mother:  Monet Valverde [[id number]]   38w4d         Breast Assessment  Right Breast: WDL  Right Nipple: Everts well , Short, and Elastic   Right Areola: WDL   Right Nipple Comfort: comfortable   Right Nipple Integrity: Intact    Left Breast: Not assessed     Infant Assessment:      DOL:37 hours of life      Feeding: Breastfeeding      Nipple Shield in Use: Yes; Already latched with nipple shield; removed after few minutes of sucking as mother started to c/o pain. ENRRIQUE achieved without the shield and infant  an additional 5 minutes.  Nipple Shield Size: Small 20mm      I&O adequacy:  Urine output: is established  Stool output: is established  Percent weight change from birthweight: -4%     Intervention during consultation:     Interventions Performed:   Assisted with breastfeeding  and Education     Latch & Positioning: Infant already latched on consult in football hold with nipple shield and sucking well. Mother tearful on consult due to difficulties with latching independently when infant is sleepy and disinterested, reports that latching was easier overnight when infant was cluster feeding. Gave reassurance of sleepy behavior following cluster feeding and normalcy to need support when learning to latch 
This note was copied from a baby's chart.  Lactation Progress Note      Data:    F/U consult for primip on day 1 po with an infant born at 38.4 weeks gestation. MOB reports breastfeeding has been going ok but baby has been very sleepy and mother has needed to use a nipple shield. Parents calling for consult to help latch sleepy baby.     Urine output: established   Stool output: established  Percent weight change from birth:  0%         Action:    Introduced self & ensured name & lactation # is on whiteboard in room. Suggested we wake infant, MOB agreeable. Reviewed football position, how to support infants head, how to support the breast, and steps for a ENRRIQUE. Woke baby and assisted mother position infant, tried first w/o nipple shield. Infant did latch but would not suck. Reviewed hand expression and suggested mother do so to entice infant. MOB able to hand express a few drops of colostrum which were fed to infant who remained uninterested.     Suggested mother try with nipple shield. Reviewed nipple shield education, application, cleaning, and weaning from. With shield infant latched and fed for 10 minutes, sleepy suck bursts with encouragement only.     Reviewed breastfeeding education, reinforced the importance of a deep latch and how to achieve it, how to break suction and try again if latch is shallow, how the breasts work to make milk & protecting milk supply, breastfeeding recommendations for exclusivity and duration, what to expect with cluster feeding, and breast care.     Reviewed infant feeding cues and encouraged mother to allow infant to breast feed on demand anytime feeding cues are shown and if no feeding cues are shown to attempt to wake infant to feed every 2-3 hours. If infant is still too sleepy to latch to hand express colostrum into infants mouth for about ten minutes, then try again in 2-3 hours.     After the first day of life to breast feed a minimum of 8-12 times a day per 24 hour period. 
This note was copied from a baby's chart.  Lactation Progress Note      Data:    F/U consult for primip on day 2 po with an infant born at 38.4 weeks gestation. MOB reports infant cluster fed some overnight and things have slowly improved today, still needing nipple shield for latching. Mother with a history of diabetes.     Urine output: established   Stool output: established  Percent weight change from birth:  -4%        Action:    Introduced self to patient as lactation, name and phone number written on white board in room. Reviewed breastfeeding education and infant feeding cues. Encouraged mother to allow infant to breast feed on demand anytime feeding cues are shown and if no feeding cues are shown to attempt to wake infant to feed every 2-3 hours with a minimum of 8-12 feeds a day per 24 hour period.     Also encouraged mother to avoid giving infant a pacifier, bottle, or pump for at least the first two weeks of life or until breast feeding is well established. Encouraged good hydration, nutrition, and rest, and to keep taking prenatal or multivitamin while lactating.     Discuss weaning from nipple shield and mother states she is in no hurry to stop using nipple shield , that it help with her mental health and makes it easier for infant. Reviewed protecting milk supply while using a nipple shield. Breast feeding log reviewed, all questions answered. Mother encouraged to call lactation for F/U care as needed.     Response:    MOB becoming more confident with feeds, verbalized an understanding of education provided and will call for assistance as needed.  
  Verbalized understanding of education and instruction    
and instructed on application and use, Education , and Skin to skin     Latch & Positioning: Assisted to position infant to the left breast in initially in a cross cradle/cradle hold however no sustained latch achieved. Changed to a football hold on the left breast. Instructed on position and hold. Instructed on the importance of maternal comfort and infant alignment to the breast for feeds. Instructed on hand expression prior to feeds and drops easily expressed. Instructed on nipple to nose, compression on the breast and bringing infant up and onto the breast for latch. Multiple latch attempts made and infant with few sucks before becoming shallow to nipple only with dimpling in at cheeks. 20 mm nipple shield obtained and instructed including cup for storage, soap, sponge and information sheet. Reviewed application and use. Once applied infant able to latch and maintain with wider gape. Infant initially slow to get started and then began with short to moderate coordinated suck bursts with encouragement. Infant with long jaw motions and few audible swallows. Infant with active feeding for about 40 minutes before releasing the breast and appearing satisfied. Infant remained with MOB. Encouraged to call for continued assist. Name and number on white board.     Manual Expression:  Expresses easily, Drops obtained, and MOB states she understands     Bedside Breast Pump:   N/A    Breast Shield Size:   N/A    Amount of milk expressed:   N/A    Pump Arrangements:  Owns breast pump    Education:  Latch & positioning , Feeding frequency & feeding cues , Exclusive breastfeeding , Breastfeeding Booklet reviewed , Expected intake and output , Feeding and diaper log , Skin to skin , Engorgement prevention & management , Nipple shields , Electric breast pump , Milk storage guidelines , Hand expression , [facility] , and [facility]     Action/Plan:  Breastfeed on cue and at least 8

## 2024-12-12 NOTE — PROGRESS NOTES
DEIRDRE Amador CRNA at bedside discussing R/B of anesthesia for  section.  
Dr. Mayers at bedside for evaluation, SVE 4cm, darrick breech, discussed R/B of  section with pt, pt consenting to proceed with  at this time. Pt states last ate slim lito at 1600. CRNA notified. U/S confirmed breech.  
[facility]  Post Partum Progress Note    Subjective:   Monet Valverde is a 34 y.o.  s/p PLTCS at 38w4d for breech presentation with SROM and labor, POD #3    Pregnancy is complicated by breech presentation, GDMA2, missed AB x 2, low lying placenta (resolved early 2nd trimester), history of fetal anomaly in prior pregnancy, and ASCUS with positive high risk HPV.     Patient is doing well today without complaints.  Reports lochia is decreasing.  Pain is well controlled.  Tolerating regular diet without nausea or vomiting.  Ambulating without difficulty, denies dizziness on standing.  Voiding without difficulty.  Denies headache, vision changes, RUQ pain.  Denies chest pain, shortness of breath, fever, chills, calf pain.  Breast feeding is going well.     Postpartum Depression: Not on file        Objective:   Vitals:    24 0122   BP: 111/69   Pulse: 81   Resp: 16   Temp: 97.4 °F (36.3 °C)   SpO2: 98%      GENERAL APPEARANCE: alert, well appearing, in no apparent distress  LUNGS: clear to auscultation, no wheezes, rales or rhonchi, symmetric air entry  HEART: regular rate and rhythm, no murmurs  ABDOMEN POSTPARTUM: Soft, appropriate tenderness to palpation.  Softly distended.  No rebound or guarding.  Incision with steri-strips in place, healing well without evidence of infection.  EXTREMITIES: no redness or tenderness in the calves or thighs, no edema  SKIN: normal coloration and turgor, no rashes  NEUROLOGIC: alert, oriented, normal speech, no focal findings or movement disorder noted    Impression: S/P     Pertinent Labs:   Recent Labs     12/10/24  1206 24   WBC 13.5* 10.8   HGB 11.5* 12.9   HCT 35.1* 38.8   MCV 88.4 87.5    137        Assessment / Plan:  Monet Valverde is a 34 y.o.  s/p PLTCS at 38w4d     POD #3     - Doing well, meeting milestones     - Hemoglobin appropriate     - VSS      - Simethicone for constipation     O pos / RI / GBS 
Patient up for the first time with the help of 2 RNs. Assist x 2 provided; patient tolerated well. Linens were changed, pericare performed, panties applied and pad changed. Patient rowland catheter remains in place. Patient ambulated back to bed without difficulty. Patient is pink and stable.  
Pt presents to triage stating around 1845 was sitting on the couch and noticed a bit of leaking, went to the toilet and noticed a bit of blood, had a bit more leaking after that and felt a few gushes in the car, clot noted here on pad about the size of a quarter. Pt also noted to start with contractions shortly after first little bit of leaking at home. +FM, placed on EFM at this time.  
[facility]  Post Partum Progress Note    Subjective:   Patient is a 34 y.o. y/o  female S/P PLTCS at 38.4 EGA. PPD # 1.     The pregnancy was complicated by:  breech presentation, GDMA2, missed AB x 2, low lying placenta (resolved early 2nd trimester), history of fetal anomaly in prior pregnancy, and ASCUS with positive high risk HPV.     Doing well today. No current complaints are noted including headache, change in vision, fever, chills, chest pain, shortness of breath, nausea, vomiting, diarrhea or constipation. The patient denies dizziness with ambulation or calf tenderness. Voiding- rowland not yet removed. Lochia is described as minimal. The patient plans to breastfeed.    Objective:   GENERAL APPEARANCE: alert, well appearing, in no apparent distress  LUNGS: Resp effort normal   ABDOMEN POSTPARTUM: benign non-tender, without masses or organomegaly palpable .   Uterine Fundus firm, NT, Below umbilicus. Incision covered by bandage   EXTREMITIES: no redness or tenderness in the calves or thighs, no edema  SKIN: normal coloration and turgor, no rashes, no suspicious skin lesions noted    Pertinent Labs:   Lab Results   Component Value Date    WBC 10.8 2024    HGB 12.9 2024    HCT 38.8 2024    MCV 87.5 2024     2024      Morning CBC still pending     Assessment / Plan:  1) Post partum    - meeting post partum milestones    - hemodynamically stable    - remove rowland with void check     2) GBS negative     3) Baby Information    - Delivery Time / Date: 2024 21:04   - APGARS: 9, 9    - Birth weight: 3260 g    - Feeding:  Breastfeeding   - Gender: Female     4) GDMA2   Fasting 100 this morning      Disposition:   Post Partum Instructions reviewed   Anticipate discharge on PPD # 2 pending clinical status     Tea Lopes DO    
today     History of Missed AB x2    History of fetal anomaly in prior pregnancy    Breast feeding     - Lactation support as needed    Infant information     - Delivery date/time: 12/9/2024 at 2104     - Gender: Female     - Weight 7lbs 3oz (3260g)     - Apgars 9 & 9     Disposition:   Inpatient for routine postpartum care.    Anahy Kumar DO

## 2024-12-12 NOTE — DISCHARGE INSTR - ACTIVITY
Call for increased pain, significant vaginal bleeding (soaking through 2 pads in < 1hr) or temperature greater than 101 degrees F.    Nothing in vagina for 6 weeks (pelvic rest), including intercourse, tampons, toys, fingers.   Advance activity as tolerated but limit lifting <10 lbs or weight of baby in carrier for first 2 weeks.   Continue PNV.   Take PRN medications as prescribed.   FU in office in 2 weeks.     Normal Diet     Please call with questions or concerns.   Toyin Austin, DO

## 2024-12-12 NOTE — PROGRESS NOTES
Return OB Office Visit    CC:   Chief Complaint   Patient presents with    Routine Prenatal Visit     Having slight cramping    Non-stress Test       Subjective:  Patient seen and examined. Doing well today without complaints.      Denies VB, LOF, ctx. +FM.  Denies headaches, vision changes, RUQ pain, increased LE edema.  Denies chest pain, shortness of breath, fever, chills, nausea, vomiting.      Is doing well with NPH and BS.    Review of Systems: The following ROS was otherwise negative, except as noted in the HPI: constitutional, HEENT, respiratory, cardiovascular, gastrointestinal, genitourinary, skin, musculoskeletal, neurological, psych    Objective:  /74 (Site: Right Upper Arm, Position: Supine, Cuff Size: Large Adult)   Pulse 72   Temp 98.3 °F (36.8 °C) (Infrared)   Ht 1.651 m (5' 5\")   Wt 95.1 kg (209 lb 9.6 oz)   LMP 08/30/2023 Comment: missed AB 10/25/23  SpO2 98%   BMI 34.88 kg/m²     Physical Exam  Vitals reviewed.   Constitutional:       General: She is not in acute distress.     Appearance: She is well-developed.   HENT:      Head: Normocephalic and atraumatic.   Eyes:      Conjunctiva/sclera: Conjunctivae normal.   Cardiovascular:      Rate and Rhythm: Normal rate.   Pulmonary:      Effort: Pulmonary effort is normal. No respiratory distress.   Abdominal:      General: There is no distension.      Palpations: Abdomen is soft.      Tenderness: There is no abdominal tenderness. There is no guarding or rebound.   Musculoskeletal:         General: Normal range of motion.   Skin:     General: Skin is warm and dry.   Neurological:      Mental Status: She is alert and oriented to person, place, and time.   Psychiatric:         Mood and Affect: Mood normal.         Behavior: Behavior normal.         Thought Content: Thought content normal.       NST  Baseline: 125  Variability: moderate in degree  Accelerations: Present  Decelerations: Absent     [employer]: Contractions present x2    Category

## 2024-12-12 NOTE — DISCHARGE SUMMARY
Department of Obstetrics and Gynecology  Postpartum Discharge Summary      Admit Date: 2024    Admit Diagnosis: Normal labor [O80, Z37.9]    Discharge Date: 24    Condition at Discharge: good    Discharge Diagnoses: primary C section due to Breech presentation     Discharge Disposition:  Home    Service: Obstetrics    Postpartum complications: none     Hospital Course: uncomplicated    [employer] Data:  Information for the patient's :  Jerrod Valverde [[id number]]      Weight   Information for the patient's :  Jerrod Valverde [[id number]]      Apgars   Information for the patient's :  Jerrod Valverde [[id number]]       Disposition of Baby:  Home with mother    FU in 2 weeks in office    Current Discharge Medication List        START taking these medications    Details   docusate sodium (COLACE, DULCOLAX) 100 MG CAPS Take 100 mg by mouth 2 times daily  Qty: 120 capsule, Refills: 0      ferrous sulfate (IRON 325) 325 (65 Fe) MG tablet Take 1 tablet by mouth daily (with breakfast)  Qty: 30 tablet, Refills: 3      ibuprofen (ADVIL;MOTRIN) 800 MG tablet Take 1 tablet by mouth every 8 (eight) hours  Qty: 120 tablet, Refills: 3      oxyCODONE (ROXICODONE) 5 MG immediate release tablet Take 1 tablet by mouth every 6 hours as needed for Pain for up to 7 days. Max Daily Amount: 20 mg  Qty: 28 tablet, Refills: 0    Comments: Reduce doses taken as pain becomes manageable  Associated Diagnoses: S/P primary low transverse            CONTINUE these medications which have NOT CHANGED    Details   Probiotic Product (PROBIOTIC PO) Take by mouth      aspirin 81 MG chewable tablet Take 1 tablet by mouth daily      Prenatal Vit-DSS-Fe Fum-FA (PNV FE FUM/DOCUSATE/FOLIC ACID PO) Take by mouth      Lancets (ONETOUCH DELICA PLUS [id number]) MISC USE AS DIRECTED  Qty: 100 each, Refills: 2      blood glucose test strips (ONETOUCH ULTRA TEST) strip USE AS DIRECTED  Qty: 50 strip, Refills:

## 2024-12-13 ENCOUNTER — LACTATION ENCOUNTER (OUTPATIENT)
Dept: NURSERY | Age: 34
End: 2024-12-13

## 2024-12-13 NOTE — LACTATION NOTE
This note was copied from a baby's chart.  Mother requests f/u, stating that infant has been sleepy with last 2 attempts to offer the breast. Last void was around 2300 last night, discharge pending infant void.   Offered support with latching baby to the breast, mother declined stating she will not be able to take lactation support home. Requests support with bottle feeding. Bottles and slow flow nipples provided and educated on how to feed. Mother was able to feed infant well with bottle using slow flow nipple. Infant took 25 mL's of mother's EBM. Encouraged to pump afterward. Triple feeding plan reinforced for discharge breastfeeding education. Encouraged to feed infant q3h, offer the breast first and ad mohinder with cues, and every 3 hours if baby is sleepy and without feeding cues. Encouraged to pump and offer a minimum of 20 mL's of EBM to infant per pediatrician orders q3h after breastfeeding. Encouraged to use any EBM towards supplement volumes. Encouraged to be cautious with pumping now that mature milk is in not to over or understimulated the breast while supplement is ordered. Encouraged to pump enough needed for volumes to feed infant for supplement order and not to pump excess volumes. Reviewed inpatient and outpatient lactation support and how to contact prn. Encouraged to call for f/u support prn.

## 2024-12-13 NOTE — LACTATION NOTE
the breast. Pump after each feed for 15 minutes.   Offer STS often while awake. Mother holding infant skin to skin between feedings will promote milk supply and allow infant to rest more deeply.   Maintain a feeding log until infant is gaining weight and seen by primary care physician.   Request breastfeeding assistance from LC or RN as needed.     Feeding Plan reviewed with: Parents     Response:   Verbalized understanding of education and instruction

## 2024-12-13 NOTE — LACTATION NOTE
This note was copied from a baby's chart.  LACTATION CONSULTATION      Follow-up Consult: Reason for Follow-up: assess needs  and provide education     Mother continues to implement triple feeding plan for LPT infant, breastfeeding first using a nipple shield to assist with latching, pumping one breast afterward x 10 minutes, and offering 20-30 mLs of EBM per orders for low output. Mother states that she has been collecting between 20-30 mL's from one breast with 10 minute pumping session. If infant breastfeeds on the right breast, mother is pumping from the left breast.     Name: Girl Monet Valverde       MRN: [id number]               YOB: 2022   Time of Birth: 9:04 PM   Gestational age: Gestational Age: 38w4d   Birth Weight: Birth Weight: 3.26 kg (7 lb 3 oz) Most Recent Weight: Weight: 2.968 kg (6 lb 8.7 oz)   Weight Change from Birth: -9%            Maternal Assessment:      Maternal Data:   Information for the patient's mother:  Monet Valverde [[id number]]   32 y.o.   /Para:   Information for the patient's mother:  Monet Valverde [[id number]]       Information for the patient's mother:  Monet Valverde [[id number]]   38w4d         Breast Assessment  Right Breast: Filling  Left Breast: Filling       Infant Assessment:      DOL:4th DOL      Feeding: Breastfeeding      Use of Supplementation: Medical due to: Low Output   Type of Supplementation: Breastmilk   Method of Supplementation: Bottle , Syringe, and Slow Flow Nipple   Alternative feeding method offered:       Nipple Shield in Use: Yes  Nipple Shield Size: Small 20mm      I&O adequacy:  Urine output: is established but low; last void was around 11:00 PM last night; discharge pending waiting on void.  Stool output: is established  Percent weight change from birthweight: -9%     Intervention during consultation:     Interventions Performed:   Education     Latch & Positioning: Offered support with latching as needed and

## 2024-12-13 NOTE — FLOWSHEET NOTE
12/12/24 2011   Provider Notification   Reason for Communication New Orders   Name of Team Member Notified Toyin Austin DO   Treatment Team Role Attending Provider   Method of Communication Call   Response See orders   Notification Time 2011     Called Dr. Austin for maternal discharge orders. Infant will remain on unit for feeding and parents will remain in room. Mother verbalizes she will be responsible for her own care and medication administration and RN will only be responsible for care of infant. Mother agreeable to plan.    Provider gave telephone order to discharge patient.

## 2024-12-13 NOTE — FLOWSHEET NOTE
24   AVS Reviewed   AVS & discharge instructions reviewed with patient and/or representative? Yes   Reviewed instructions with Patient   Level of Understanding Questions answered;Teach back completed;Verbalized understanding     Mother verbalized readiness for discharge. Parents to remain on OB unit with infant until  is clear for discharge per Neonatology.

## 2024-12-23 ENCOUNTER — POSTPARTUM VISIT (OUTPATIENT)
Dept: OBGYN CLINIC | Age: 34
End: 2024-12-23

## 2024-12-23 VITALS
WEIGHT: 180.2 LBS | BODY MASS INDEX: 29.99 KG/M2 | DIASTOLIC BLOOD PRESSURE: 64 MMHG | SYSTOLIC BLOOD PRESSURE: 132 MMHG | HEART RATE: 79 BPM

## 2024-12-23 DIAGNOSIS — F41.9 ANXIETY: ICD-10-CM

## 2024-12-23 DIAGNOSIS — Z98.891 S/P PRIMARY LOW TRANSVERSE C-SECTION: ICD-10-CM

## 2024-12-23 DIAGNOSIS — Z09 POSTOP CHECK: Primary | ICD-10-CM

## 2024-12-23 PROBLEM — O32.9XX0 FETAL MALPRESENTATION: Status: RESOLVED | Noted: 2024-10-18 | Resolved: 2024-12-23

## 2024-12-23 PROBLEM — O02.1 MISSED ABORTION WITH FETAL DEMISE BEFORE 20 COMPLETED WEEKS OF GESTATION: Status: RESOLVED | Noted: 2023-03-16 | Resolved: 2024-12-23

## 2024-12-23 PROBLEM — Z37.9 NORMAL LABOR: Status: RESOLVED | Noted: 2024-12-09 | Resolved: 2024-12-23

## 2024-12-23 PROBLEM — Z34.93 PRENATAL CARE IN THIRD TRIMESTER: Status: RESOLVED | Noted: 2024-05-20 | Resolved: 2024-12-23

## 2024-12-23 PROBLEM — O24.414 INSULIN CONTROLLED GESTATIONAL DIABETES MELLITUS (GDM) IN THIRD TRIMESTER: Status: RESOLVED | Noted: 2024-10-18 | Resolved: 2024-12-23

## 2024-12-23 PROBLEM — O44.40 LOW-LYING PLACENTA: Status: RESOLVED | Noted: 2024-07-23 | Resolved: 2024-12-23

## 2024-12-23 PROBLEM — O09.299 HISTORY OF FETAL ANOMALY IN PRIOR PREGNANCY, CURRENTLY PREGNANT: Status: RESOLVED | Noted: 2024-05-20 | Resolved: 2024-12-23

## 2024-12-23 PROCEDURE — 0503F POSTPARTUM CARE VISIT: CPT | Performed by: OBSTETRICS & GYNECOLOGY

## 2024-12-23 ASSESSMENT — ENCOUNTER SYMPTOMS
CONSTIPATION: 0
VOMITING: 0
RESPIRATORY NEGATIVE: 1
ABDOMINAL PAIN: 0
NAUSEA: 0
GASTROINTESTINAL NEGATIVE: 1
SHORTNESS OF BREATH: 0
DIARRHEA: 0

## 2024-12-23 NOTE — PROGRESS NOTES
not in acute distress.     Appearance: Normal appearance. She is well-developed. She is not ill-appearing, toxic-appearing or diaphoretic.   Pulmonary:      Effort: Pulmonary effort is normal.   Abdominal:      General: There is no distension.      Palpations: Abdomen is soft.      Tenderness: There is no abdominal tenderness. There is no guarding.          Comments: Incision clean dry intact.  No apparent signs of infection or compromise    Skin:     General: Skin is warm and dry.   Neurological:      Mental Status: She is alert and oriented to person, place, and time.   Psychiatric:         Mood and Affect: Mood normal.         Behavior: Behavior normal.         Thought Content: Thought content normal.         Judgment: Judgment normal.            Assessment    Diagnosis Orders   1. Postop check        2. S/P primary low transverse         3. Anxiety        4. Lactating mother                Plan   Appropriate pregnancy spacing reviewed.   Options for contraception reviewed.   Follow up prn and 4 weeks for postpartum visit.           Cari Mayers DO

## 2025-01-15 ENCOUNTER — PATIENT MESSAGE (OUTPATIENT)
Dept: OBGYN CLINIC | Age: 35
End: 2025-01-15

## 2025-01-15 NOTE — TELEPHONE ENCOUNTER
I wouldn't recommend treating this without an exam to confirm at this point. We can evaluate at her visit next week. Thanks.

## 2025-01-23 ENCOUNTER — POSTPARTUM VISIT (OUTPATIENT)
Dept: OBGYN CLINIC | Age: 35
End: 2025-01-23

## 2025-01-23 VITALS
TEMPERATURE: 98.1 F | BODY MASS INDEX: 29.16 KG/M2 | DIASTOLIC BLOOD PRESSURE: 68 MMHG | OXYGEN SATURATION: 98 % | WEIGHT: 175 LBS | HEIGHT: 65 IN | HEART RATE: 73 BPM | SYSTOLIC BLOOD PRESSURE: 110 MMHG

## 2025-01-23 DIAGNOSIS — Z98.891 S/P PRIMARY LOW TRANSVERSE C-SECTION: Primary | ICD-10-CM

## 2025-01-23 DIAGNOSIS — F41.9 ANXIETY: ICD-10-CM

## 2025-01-23 PROCEDURE — 0503F POSTPARTUM CARE VISIT: CPT | Performed by: OBSTETRICS & GYNECOLOGY

## 2025-01-23 NOTE — PROGRESS NOTES
Postpartum Visit    Subjective:  34 y.o.  female S/P uncomplicated  on 24 here for postpartum visit. The pregnancy was complicated by breech presentation, GDMA2, missed AB x 2, low lying placenta (resolved early 2nd trimester), history of fetal anomaly in prior pregnancy, and ASCUS with positive high risk HPV.     Postpartum course has been uncomplicated. Pain has resolved. Bleeding has resolved. Bowel function is normal. Bladder function is normal. Patient is not sexually active. Contraception method is abstinence. Baby has been doing well without problems. Baby is feeding by bottle -- EP. No other complaints are noted including headache, change in vision, fever, chills, chest pain, shortness of breath, nausea, vomiting, diarrhea or constipation. The patient denies any urinary complaints or calf tenderness.     Review of Systems - The following ROS was otherwise negative, except as noted in the HPI: constitutional, respiratory, cardiovascular, gastrointestinal, genitourinary    Objective:  GENERAL APPEARANCE: alert, well appearing, in no apparent distress  LUNGS: clear to auscultation, no wheezes, rales or rhonchi, symmetric air entry  HEART: regular rate and rhythm  ABDOMEN POSTPARTUM: incision well-healed, without erythema, without hematoma, and without evidence of infection  EXTREMITIES: no redness or tenderness in the calves or thighs, no edema    MWQ: 7, no SI/HI     Impression:  34 y.o.  s/p  Section on 24 here for her postpartum visit:    Plan:   Diagnosis Orders   1. S/P primary low transverse         2. Lactating mother        3. Anxiety          Doing well, routine care  - Feeding: EP, bottle feeding  - BCM: paragard, paperwork started today  - Moods: no signs/sx PPD, denies SI/HI  - Bleeding: improved    Dispo: PRN or for IUD insertion  Key Vaz MD

## 2025-01-31 ENCOUNTER — TELEPHONE (OUTPATIENT)
Dept: OBGYN CLINIC | Age: 35
End: 2025-01-31

## 2025-01-31 ENCOUNTER — PATIENT MESSAGE (OUTPATIENT)
Dept: OBGYN CLINIC | Age: 35
End: 2025-01-31

## 2025-02-03 ENCOUNTER — PATIENT MESSAGE (OUTPATIENT)
Dept: OBGYN CLINIC | Age: 35
End: 2025-02-03

## 2025-02-04 ENCOUNTER — LAB (OUTPATIENT)
Dept: OBGYN CLINIC | Age: 35
End: 2025-02-04
Payer: COMMERCIAL

## 2025-02-04 DIAGNOSIS — Z30.014 ENCOUNTER FOR INITIAL PRESCRIPTION OF INTRAUTERINE CONTRACEPTIVE DEVICE (IUD): Primary | ICD-10-CM

## 2025-02-04 LAB
CONTROL: NORMAL
PREGNANCY TEST URINE, POC: NEGATIVE

## 2025-02-04 PROCEDURE — 81025 URINE PREGNANCY TEST: CPT | Performed by: OBSTETRICS & GYNECOLOGY

## 2025-02-18 ENCOUNTER — OFFICE VISIT (OUTPATIENT)
Dept: OBGYN CLINIC | Age: 35
End: 2025-02-18
Payer: COMMERCIAL

## 2025-02-18 VITALS
HEIGHT: 65 IN | HEART RATE: 63 BPM | SYSTOLIC BLOOD PRESSURE: 94 MMHG | OXYGEN SATURATION: 97 % | DIASTOLIC BLOOD PRESSURE: 64 MMHG | BODY MASS INDEX: 29.16 KG/M2 | WEIGHT: 175 LBS

## 2025-02-18 DIAGNOSIS — Z30.430 ENCOUNTER FOR IUD INSERTION: Primary | ICD-10-CM

## 2025-02-18 LAB
CONTROL: NORMAL
PREGNANCY TEST URINE, POC: NEGATIVE

## 2025-02-18 PROCEDURE — 58300 INSERT INTRAUTERINE DEVICE: CPT | Performed by: OBSTETRICS & GYNECOLOGY

## 2025-02-18 PROCEDURE — 81025 URINE PREGNANCY TEST: CPT | Performed by: OBSTETRICS & GYNECOLOGY

## 2025-02-18 RX ORDER — COPPER 313.4 MG/1
1 INTRAUTERINE DEVICE INTRAUTERINE ONCE
Status: COMPLETED | OUTPATIENT
Start: 2025-02-18 | End: 2025-02-18

## 2025-02-18 RX ADMIN — COPPER 1 EACH: 313.4 INTRAUTERINE DEVICE INTRAUTERINE at 16:48

## 2025-02-18 NOTE — PROGRESS NOTES
IUD Insertion Procedure Note    Pre-operative Diagnosis: Desire Paragard IUD    Post-operative Diagnosis: Same    Procedure Details   Urine pregnancy test was done and result was negative. The risks (including infection, bleeding, pain, and uterine perforation) and benefits of the procedure were explained to the patient and written informed consent was obtained. Speculum was then placed in the vagina and a single tooth tenaculum was applied to the anterior lip of the cervix.  The uterus was sounded to a depth of 7 cm.  The IUD was then placed according to  instructions, applicator removed and strings trimmed to 3cm.      The single tooth tenaculum was removed. Excellent hemostasis was assured. The speculum was then removed.      The patient tolerated the procedure well. She was given return precautions and verbally stated understanding.     IUD Information:  Type of IUD: Paragard IUD  Lot #[id number]  Condition: Stable     Date of insertion: 2/18/2025  Anticipated date of removal: 2/18/2035    Complications: None    Plan:  The patient was advised to call for any fever or for prolonged or severe pain or bleeding. She was advised to use OTC analgesics as needed for mild to moderate pain.     Key Vaz MD

## 2025-03-26 ENCOUNTER — OFFICE VISIT (OUTPATIENT)
Dept: OBGYN CLINIC | Age: 35
End: 2025-03-26
Payer: COMMERCIAL

## 2025-03-26 VITALS
SYSTOLIC BLOOD PRESSURE: 108 MMHG | BODY MASS INDEX: 27.32 KG/M2 | HEIGHT: 65 IN | OXYGEN SATURATION: 98 % | WEIGHT: 164 LBS | DIASTOLIC BLOOD PRESSURE: 71 MMHG | HEART RATE: 66 BPM

## 2025-03-26 DIAGNOSIS — Z30.431 IUD CHECK UP: Primary | ICD-10-CM

## 2025-03-26 PROCEDURE — 1036F TOBACCO NON-USER: CPT | Performed by: OBSTETRICS & GYNECOLOGY

## 2025-03-26 PROCEDURE — 99213 OFFICE O/P EST LOW 20 MIN: CPT | Performed by: OBSTETRICS & GYNECOLOGY

## 2025-03-26 PROCEDURE — G8427 DOCREV CUR MEDS BY ELIG CLIN: HCPCS | Performed by: OBSTETRICS & GYNECOLOGY

## 2025-03-26 PROCEDURE — G8417 CALC BMI ABV UP PARAM F/U: HCPCS | Performed by: OBSTETRICS & GYNECOLOGY

## 2025-03-26 NOTE — PROGRESS NOTES
Return Gyn Office Visit    CC:   Chief Complaint   Patient presents with    Follow-up       HPI:  34 y.o.  who presents to office for IUD check. Had a Paragard IUD placed 25, doing well since that time. Had a normal period, no pain or bleeding.     Objective:  /71   Pulse 66   Ht 1.651 m (5' 5\")   Wt 74.4 kg (164 lb)   SpO2 98%   BMI 27.29 kg/m²   Physical Exam  HENT:      Head: Normocephalic.   Cardiovascular:      Rate and Rhythm: Normal rate.   Pulmonary:      Effort: Pulmonary effort is normal.   Abdominal:      Palpations: Abdomen is soft.   Genitourinary:     Comments: IUD strings visible at os  Neurological:      Mental Status: She is alert.         Assessment/Plan  1. IUD check up  Paragard placed at last visit, doing well today, strings at os. Return precautions discussed, questions answered    Dispo: PRN or 6 months for annual exam  Key Vaz MD

## (undated) DEVICE — ANTI-FOG SOLUTION WITH FOAM PAD: Brand: DEVON

## (undated) DEVICE — INVIEW CLEAR LEGGINGS: Brand: CONVERTORS

## (undated) DEVICE — TROCAR: Brand: KII FIOS FIRST ENTRY

## (undated) DEVICE — NEEDLE INSUF L120MM DIA2MM DISP FOR PNEUMOPERI ENDOPATH

## (undated) DEVICE — SUTURE MCRYL SZ 4-0 L18IN ABSRB UD L19MM PS-2 3/8 CIR PRIM Y496G

## (undated) DEVICE — SUTURE VICRYL SZ 0 L36IN ABSRB UD L36MM CT-1 1/2 CIR J946H

## (undated) DEVICE — ELECTRODE PT RET AD L9FT HI MOIST COND ADH HYDRGEL CORDED

## (undated) DEVICE — 3M™ TEGADERM™ TRANSPARENT FILM DRESSING FRAME STYLE, 1626W, 4 IN X 4-3/4 IN (10 CM X 12 CM), 50/CT 4CT/CASE: Brand: 3M™ TEGADERM™

## (undated) DEVICE — TRAY URIN CATH 16FR DRNGE BG STATLOK STBL DEV F SURSTP

## (undated) DEVICE — SUTURE VICRYL + SZ 2-0 L27IN ABSRB CLR CT-1 1/2 CIR TAPERCUT VCP259H

## (undated) DEVICE — SYSTEM COLL W/ TISS TRAP INCLUDE COLL CANSTR LID SET OF

## (undated) DEVICE — SUTURE VCRL SZ 2-0 L27IN ABSRB VLT L26MM SH 1/2 CIR J317H

## (undated) DEVICE — 3M™ STERI-STRIP™ COMPOUND BENZOIN TINCTURE 40 BAGS/CARTON 4 CARTONS/CASE C1544: Brand: 3M™ STERI-STRIP™

## (undated) DEVICE — MINOR SET UP PK

## (undated) DEVICE — GOWN,SIRUS,NON REINFRCD,LARGE,SET IN SL: Brand: MEDLINE

## (undated) DEVICE — LIGHT HANDLE: Brand: DEVON

## (undated) DEVICE — PVC URETHRAL CATHETER: Brand: DOVER

## (undated) DEVICE — GARMENT COMPR L FOR 23IN CALF FLOTRN

## (undated) DEVICE — SET FLD MGMT OUTFLO TB DISP FOR CTRL SYS AQUILEX

## (undated) DEVICE — DRAPE,UNDERBUTTOCKS,PCH,STERILE: Brand: MEDLINE

## (undated) DEVICE — GLOVE SURG SZ 65 L12IN FNGR THK75MIL WHT LTX POLYMER BEAD

## (undated) DEVICE — TROCAR: Brand: KII SLEEVE

## (undated) DEVICE — PREMIUM WET SKIN PREP TRAY: Brand: MEDLINE INDUSTRIES, INC.

## (undated) DEVICE — DISSECTOR ULTRASONIC L39CM CRV JAW CRDLSS SONICISION

## (undated) DEVICE — SET COLL TBNG L6FT DIA3/8IN W/ INTEGR SWVL HNDL SLIP RNG M

## (undated) DEVICE — ENDO CARRY-ON PROCEDURE KIT INCLUDES SUCTION TUBING, LUBRICANT, GAUZE, BIOHAZARD STICKER, TRANSPORT PAD AND INTERCEPT BEDSIDE KIT.: Brand: ENDO CARRY-ON PROCEDURE KIT

## (undated) DEVICE — SUTURE ABSORBABLE BRAIDED 2-0 CT-1 27 IN UD VICRYL J259H

## (undated) DEVICE — GLOVE SURG SZ 65 L12IN FNGR THK94MIL STD WHT LTX FREE

## (undated) DEVICE — SOLUTION IRRIG 5L LAC R BG

## (undated) DEVICE — SET FLD MGMT CTRL SYS INFLO TB AQUILEX

## (undated) DEVICE — SHEET,DRAPE,53X77,STERILE: Brand: MEDLINE

## (undated) DEVICE — UNDERPAD HOSP W30XL36IN GRN POLYPR HI ABSRB DISP

## (undated) DEVICE — PAD,NON-ADHERENT,3X8,STERILE,LF,1/PK: Brand: MEDLINE

## (undated) DEVICE — PUMP SUC IRR TBNG L10FT W/ HNDPC ASSEMB STRYKEFLOW 2

## (undated) DEVICE — SUTURE VCRL SZ 0 L18IN ABSRB UD L36MM CT-1 1/2 CIR J840D

## (undated) DEVICE — S/USE RESUS KIT W/O MASK (10): Brand: FISHER & PAYKEL HEALTHCARE

## (undated) DEVICE — GLOVE SURG SZ 65 THK91MIL LTX FREE SYN POLYISOPRENE

## (undated) DEVICE — DEVICE MANIP L330MM DIA4.5MM UTER DISP INJ ZINNANTI KRONNER

## (undated) DEVICE — Z INACTIVE NO ACTIVE SUPPLIER APPLICATOR MEDICATED 26 CC TINT HI-LITE ORNG STRL CHLORAPREP

## (undated) DEVICE — GAUZE,SPONGE,4"X4",16PLY,STRL,LF,10/TRAY: Brand: MEDLINE

## (undated) DEVICE — 1200CC HIFLOW SUCTION CANISTER WITH AEROSTAT FILTER, FLOAT VALVE SHUTOFF WITH GREEN LID: Brand: BEMIS

## (undated) DEVICE — SYRINGE MED 10ML POLYPR GEN PURP FLAT TOP LUERLOCK TIP

## (undated) DEVICE — Device

## (undated) DEVICE — DRESSING COMP IS W4XL10IN PD W2XL8IN CNTCT LAYR ADH

## (undated) DEVICE — SOLUTION IV IRRIG LACTATED RINGERS 3000ML 2B7487

## (undated) DEVICE — SET ENDOSCP SEAL HYSTEROSCOPE RIG OUTFLO CHN DISP MYOSURE

## (undated) DEVICE — GAUZE,SPONGE,4"X4",16PLY,XRAY,STRL,LF: Brand: MEDLINE

## (undated) DEVICE — Z INACTIVE USE 2660665 SOLUTION IRRIG 1000ML 0.9% SOD CHL USP POUR PLAS BTL

## (undated) DEVICE — CANNULA VAC 7MM CRV

## (undated) DEVICE — SUTURE VCRL SZ 4-0 L18IN ABSRB UD L19MM PS-2 3/8 CIR PRIM J496H

## (undated) DEVICE — [HIGH FLOW INSUFFLATOR,  DO NOT USE IF PACKAGE IS DAMAGED,  KEEP DRY,  KEEP AWAY FROM SUNLIGHT,  PROTECT FROM HEAT AND RADIOACTIVE SOURCES.]: Brand: PNEUMOSURE

## (undated) DEVICE — CATHETERIZATION KIT PEDIATRIC 16 FR 5 CC INDWL INF CTRL

## (undated) DEVICE — BLADE CLIPPER GEN PURP NS

## (undated) DEVICE — GLOVE ORANGE PI 7   MSG9070

## (undated) DEVICE — TISSUE RETRIEVAL SYSTEM: Brand: INZII RETRIEVAL SYSTEM

## (undated) DEVICE — SUTURE VICRYL SZ 4-0 L27IN ABSRB UD L60MM KS STR REV CUT NDL J662H

## (undated) DEVICE — INTENDED FOR TISSUE SEPARATION, AND OTHER PROCEDURES THAT REQUIRE A SHARP SURGICAL BLADE TO PUNCTURE OR CUT.: Brand: BARD-PARKER ® STAINLESS STEEL BLADES

## (undated) DEVICE — GLOVE,SURG,SENSICARE,ALOE,LF,PF,6: Brand: MEDLINE

## (undated) DEVICE — MINOR SET UP PACK: Brand: MEDLINE INDUSTRIES, INC.

## (undated) DEVICE — TOWEL,OR,DSP,ST,BLUE,STD,8/PK,10PK/CS: Brand: MEDLINE

## (undated) DEVICE — GLOVE ORTHO 8   MSG9480